# Patient Record
Sex: MALE | Race: WHITE | Employment: OTHER | URBAN - METROPOLITAN AREA
[De-identification: names, ages, dates, MRNs, and addresses within clinical notes are randomized per-mention and may not be internally consistent; named-entity substitution may affect disease eponyms.]

---

## 2018-01-01 ENCOUNTER — APPOINTMENT (OUTPATIENT)
Dept: CT IMAGING | Age: 83
DRG: 066 | End: 2018-01-01
Payer: MEDICARE

## 2018-01-01 ENCOUNTER — HOSPITAL ENCOUNTER (OUTPATIENT)
Age: 83
Discharge: HOME | End: 2018-07-13
Payer: MEDICARE

## 2018-01-01 ENCOUNTER — HOSPITAL ENCOUNTER (INPATIENT)
Age: 83
LOS: 1 days | Discharge: HOME | DRG: 066 | End: 2018-06-22
Attending: EMERGENCY MEDICINE | Admitting: INTERNAL MEDICINE
Payer: MEDICARE

## 2018-01-01 ENCOUNTER — TELEPHONE (OUTPATIENT)
Dept: CARDIOLOGY CLINIC | Age: 83
End: 2018-01-01

## 2018-01-01 ENCOUNTER — APPOINTMENT (OUTPATIENT)
Dept: MRI IMAGING | Age: 83
DRG: 066 | End: 2018-01-01
Payer: MEDICARE

## 2018-01-01 ENCOUNTER — TELEPHONE (OUTPATIENT)
Dept: SURGERY | Age: 83
End: 2018-01-01

## 2018-01-01 ENCOUNTER — HOSPITAL ENCOUNTER (OUTPATIENT)
Dept: CARDIOLOGY | Age: 83
Discharge: HOME | End: 2018-09-05
Payer: MEDICARE

## 2018-01-01 ENCOUNTER — CARE COORDINATION (OUTPATIENT)
Dept: CASE MANAGEMENT | Age: 83
End: 2018-01-01

## 2018-01-01 ENCOUNTER — APPOINTMENT (OUTPATIENT)
Dept: GENERAL RADIOLOGY | Age: 83
DRG: 066 | End: 2018-01-01
Payer: MEDICARE

## 2018-01-01 ENCOUNTER — OFFICE VISIT (OUTPATIENT)
Dept: FAMILY MEDICINE CLINIC | Age: 83
End: 2018-01-01
Payer: MEDICARE

## 2018-01-01 ENCOUNTER — APPOINTMENT (OUTPATIENT)
Dept: ULTRASOUND IMAGING | Age: 83
DRG: 066 | End: 2018-01-01
Payer: MEDICARE

## 2018-01-01 ENCOUNTER — OFFICE VISIT (OUTPATIENT)
Dept: CARDIOLOGY CLINIC | Age: 83
End: 2018-01-01
Payer: MEDICARE

## 2018-01-01 ENCOUNTER — NURSE ONLY (OUTPATIENT)
Dept: FAMILY MEDICINE CLINIC | Age: 83
End: 2018-01-01
Payer: MEDICARE

## 2018-01-01 VITALS
OXYGEN SATURATION: 98 % | HEIGHT: 71 IN | DIASTOLIC BLOOD PRESSURE: 64 MMHG | TEMPERATURE: 98.1 F | BODY MASS INDEX: 24.56 KG/M2 | HEART RATE: 70 BPM | RESPIRATION RATE: 18 BRPM | SYSTOLIC BLOOD PRESSURE: 130 MMHG | WEIGHT: 175.4 LBS

## 2018-01-01 VITALS
SYSTOLIC BLOOD PRESSURE: 90 MMHG | DIASTOLIC BLOOD PRESSURE: 60 MMHG | BODY MASS INDEX: 24.78 KG/M2 | WEIGHT: 177 LBS | HEART RATE: 58 BPM | TEMPERATURE: 98.2 F | RESPIRATION RATE: 18 BRPM | OXYGEN SATURATION: 96 % | HEIGHT: 71 IN

## 2018-01-01 VITALS
HEART RATE: 61 BPM | RESPIRATION RATE: 16 BRPM | DIASTOLIC BLOOD PRESSURE: 68 MMHG | BODY MASS INDEX: 24.11 KG/M2 | SYSTOLIC BLOOD PRESSURE: 130 MMHG | HEIGHT: 71 IN | WEIGHT: 172.2 LBS

## 2018-01-01 DIAGNOSIS — I25.10 CORONARY ARTERY DISEASE INVOLVING NATIVE HEART WITHOUT ANGINA PECTORIS, UNSPECIFIED VESSEL OR LESION TYPE: Chronic | ICD-10-CM

## 2018-01-01 DIAGNOSIS — E11.9 DIABETES MELLITUS WITHOUT COMPLICATION (HCC): Chronic | ICD-10-CM

## 2018-01-01 DIAGNOSIS — Z12.5 PROSTATE CANCER SCREENING: ICD-10-CM

## 2018-01-01 DIAGNOSIS — Z23 NEED FOR INFLUENZA VACCINATION: Primary | ICD-10-CM

## 2018-01-01 DIAGNOSIS — I71.40 ABDOMINAL AORTIC ANEURYSM (AAA) WITHOUT RUPTURE: Chronic | ICD-10-CM

## 2018-01-01 DIAGNOSIS — I63.9 ACUTE CEREBROVASCULAR ACCIDENT (CVA) (HCC): ICD-10-CM

## 2018-01-01 DIAGNOSIS — E78.5 HYPERLIPIDEMIA, UNSPECIFIED HYPERLIPIDEMIA TYPE: Chronic | ICD-10-CM

## 2018-01-01 DIAGNOSIS — Z23 NEED FOR PROPHYLACTIC VACCINATION AGAINST STREPTOCOCCUS PNEUMONIAE (PNEUMOCOCCUS): ICD-10-CM

## 2018-01-01 DIAGNOSIS — I71.40 ABDOMINAL AORTIC ANEURYSM (AAA) WITHOUT RUPTURE: ICD-10-CM

## 2018-01-01 DIAGNOSIS — I63.9 CEREBROVASCULAR ACCIDENT (CVA), UNSPECIFIED MECHANISM (HCC): ICD-10-CM

## 2018-01-01 DIAGNOSIS — I63.411 CEREBROVASCULAR ACCIDENT (CVA) DUE TO EMBOLISM OF RIGHT MIDDLE CEREBRAL ARTERY (HCC): Primary | ICD-10-CM

## 2018-01-01 DIAGNOSIS — G45.9 TRANSIENT CEREBRAL ISCHEMIA, UNSPECIFIED TYPE: Primary | ICD-10-CM

## 2018-01-01 DIAGNOSIS — I63.9 ACUTE CVA (CEREBROVASCULAR ACCIDENT) (HCC): ICD-10-CM

## 2018-01-01 DIAGNOSIS — I63.9 CEREBROVASCULAR ACCIDENT (CVA), UNSPECIFIED MECHANISM (HCC): Primary | ICD-10-CM

## 2018-01-01 DIAGNOSIS — I63.9 ACUTE CEREBROVASCULAR ACCIDENT (CVA) (HCC): Primary | ICD-10-CM

## 2018-01-01 DIAGNOSIS — I63.9 ACUTE CVA (CEREBROVASCULAR ACCIDENT) (HCC): Primary | ICD-10-CM

## 2018-01-01 DIAGNOSIS — I65.23 ASYMPTOMATIC BILATERAL CAROTID ARTERY STENOSIS: ICD-10-CM

## 2018-01-01 DIAGNOSIS — I10 ESSENTIAL HYPERTENSION, BENIGN: Primary | Chronic | ICD-10-CM

## 2018-01-01 LAB
ALBUMIN SERPL-MCNC: 3.7 G/DL (ref 3.5–5.2)
ALBUMIN SERPL-MCNC: 3.9 G/DL (ref 3.5–5.2)
ALP BLD-CCNC: 178 U/L (ref 40–129)
ALP BLD-CCNC: 180 U/L (ref 40–129)
ALT SERPL-CCNC: 18 U/L (ref 0–40)
ALT SERPL-CCNC: 22 U/L (ref 0–40)
ANION GAP SERPL CALCULATED.3IONS-SCNC: 11 MMOL/L (ref 7–16)
ANION GAP SERPL CALCULATED.3IONS-SCNC: 13 MMOL/L (ref 7–16)
ANION GAP SERPL CALCULATED.3IONS-SCNC: 14 MMOL/L (ref 7–16)
AST SERPL-CCNC: 16 U/L (ref 0–39)
AST SERPL-CCNC: 17 U/L (ref 0–39)
BACTERIA: ABNORMAL /HPF
BASOPHILIC STIPPLING: ABNORMAL
BASOPHILS ABSOLUTE: 0 E9/L (ref 0–0.2)
BASOPHILS ABSOLUTE: 0.07 E9/L (ref 0–0.2)
BASOPHILS RELATIVE PERCENT: 0.7 % (ref 0–2)
BASOPHILS RELATIVE PERCENT: 1.8 % (ref 0–2)
BILIRUB SERPL-MCNC: 0.4 MG/DL (ref 0–1.2)
BILIRUB SERPL-MCNC: 0.4 MG/DL (ref 0–1.2)
BILIRUBIN URINE: NEGATIVE
BLOOD, URINE: NEGATIVE
BUN BLDV-MCNC: 25 MG/DL (ref 8–23)
BUN BLDV-MCNC: 27 MG/DL (ref 8–23)
BUN BLDV-MCNC: 31 MG/DL (ref 8–23)
BURR CELLS: ABNORMAL
CALCIUM SERPL-MCNC: 8.3 MG/DL (ref 8.6–10.2)
CALCIUM SERPL-MCNC: 8.4 MG/DL (ref 8.6–10.2)
CALCIUM SERPL-MCNC: 8.6 MG/DL (ref 8.6–10.2)
CHLORIDE BLD-SCNC: 103 MMOL/L (ref 98–107)
CHLORIDE BLD-SCNC: 104 MMOL/L (ref 98–107)
CHLORIDE BLD-SCNC: 106 MMOL/L (ref 98–107)
CHOLESTEROL, TOTAL: 108 MG/DL (ref 0–199)
CLARITY: CLEAR
CO2: 23 MMOL/L (ref 22–29)
CO2: 25 MMOL/L (ref 22–29)
CO2: 25 MMOL/L (ref 22–29)
COLOR: YELLOW
CREAT SERPL-MCNC: 1.3 MG/DL (ref 0.7–1.2)
EKG ATRIAL RATE: 89 BPM
EKG P AXIS: 38 DEGREES
EKG P-R INTERVAL: 206 MS
EKG Q-T INTERVAL: 388 MS
EKG QRS DURATION: 128 MS
EKG QTC CALCULATION (BAZETT): 472 MS
EKG R AXIS: -64 DEGREES
EKG T AXIS: 41 DEGREES
EKG VENTRICULAR RATE: 89 BPM
EOSINOPHILS ABSOLUTE: 0.22 E9/L (ref 0.05–0.5)
EOSINOPHILS ABSOLUTE: 0.25 E9/L (ref 0.05–0.5)
EOSINOPHILS RELATIVE PERCENT: 5.2 % (ref 0–6)
EOSINOPHILS RELATIVE PERCENT: 6.1 % (ref 0–6)
GFR AFRICAN AMERICAN: >60
GFR NON-AFRICAN AMERICAN: 52 ML/MIN/1.73
GLUCOSE BLD-MCNC: 107 MG/DL (ref 74–109)
GLUCOSE BLD-MCNC: 172 MG/DL (ref 74–109)
GLUCOSE BLD-MCNC: 90 MG/DL (ref 74–109)
GLUCOSE URINE: NEGATIVE MG/DL
HBA1C MFR BLD: 5.5 % (ref 4–5.6)
HCT VFR BLD CALC: 34.2 % (ref 37–54)
HCT VFR BLD CALC: 34.6 % (ref 37–54)
HDLC SERPL-MCNC: 29 MG/DL
HEMOGLOBIN: 11.1 G/DL (ref 12.5–16.5)
HEMOGLOBIN: 11.3 G/DL (ref 12.5–16.5)
KETONES, URINE: NEGATIVE MG/DL
LDL CHOLESTEROL CALCULATED: 55 MG/DL (ref 0–99)
LEUKOCYTE ESTERASE, URINE: NEGATIVE
LV EF: 55 %
LVEF MODALITY: NORMAL
LYMPHOCYTES ABSOLUTE: 0.33 E9/L (ref 1.5–4)
LYMPHOCYTES ABSOLUTE: 0.46 E9/L (ref 1.5–4)
LYMPHOCYTES RELATIVE PERCENT: 11.3 % (ref 20–42)
LYMPHOCYTES RELATIVE PERCENT: 7.9 % (ref 20–42)
MCH RBC QN AUTO: 31.5 PG (ref 26–35)
MCH RBC QN AUTO: 31.9 PG (ref 26–35)
MCHC RBC AUTO-ENTMCNC: 32.5 % (ref 32–34.5)
MCHC RBC AUTO-ENTMCNC: 32.7 % (ref 32–34.5)
MCV RBC AUTO: 96.4 FL (ref 80–99.9)
MCV RBC AUTO: 98.3 FL (ref 80–99.9)
METER GLUCOSE: 84 MG/DL (ref 70–110)
MONOCYTES ABSOLUTE: 0.45 E9/L (ref 0.1–0.95)
MONOCYTES ABSOLUTE: 0.5 E9/L (ref 0.1–0.95)
MONOCYTES RELATIVE PERCENT: 10.5 % (ref 2–12)
MONOCYTES RELATIVE PERCENT: 12.2 % (ref 2–12)
NEUTROPHILS ABSOLUTE: 2.98 E9/L (ref 1.8–7.3)
NEUTROPHILS ABSOLUTE: 3.03 E9/L (ref 1.8–7.3)
NEUTROPHILS RELATIVE PERCENT: 71.3 % (ref 43–80)
NEUTROPHILS RELATIVE PERCENT: 73.7 % (ref 43–80)
NITRITE, URINE: NEGATIVE
NUCLEATED RED BLOOD CELLS: 0.9 /100 WBC
PDW BLD-RTO: 13.2 FL (ref 11.5–15)
PDW BLD-RTO: 13.2 FL (ref 11.5–15)
PH UA: 6 (ref 5–9)
PLATELET # BLD: 111 E9/L (ref 130–450)
PLATELET # BLD: 120 E9/L (ref 130–450)
PMV BLD AUTO: 10.8 FL (ref 7–12)
PMV BLD AUTO: 12.2 FL (ref 7–12)
POIKILOCYTES: ABNORMAL
POTASSIUM SERPL-SCNC: 4.4 MMOL/L (ref 3.5–5)
POTASSIUM SERPL-SCNC: 4.8 MMOL/L (ref 3.5–5)
POTASSIUM SERPL-SCNC: 5.9 MMOL/L (ref 3.5–5)
PROSTATE SPECIFIC ANTIGEN: 0.65 NG/ML (ref 0–4)
PROTEIN UA: 30 MG/DL
RBC # BLD: 3.48 E12/L (ref 3.8–5.8)
RBC # BLD: 3.59 E12/L (ref 3.8–5.8)
RBC # BLD: NORMAL 10*6/UL
RBC UA: ABNORMAL /HPF (ref 0–2)
SODIUM BLD-SCNC: 140 MMOL/L (ref 132–146)
SODIUM BLD-SCNC: 141 MMOL/L (ref 132–146)
SODIUM BLD-SCNC: 143 MMOL/L (ref 132–146)
SPECIFIC GRAVITY UA: 1.01 (ref 1–1.03)
TOTAL PROTEIN: 6.6 G/DL (ref 6.4–8.3)
TOTAL PROTEIN: 6.7 G/DL (ref 6.4–8.3)
TRIGL SERPL-MCNC: 119 MG/DL (ref 0–149)
TROPONIN: <0.01 NG/ML (ref 0–0.03)
TSH SERPL DL<=0.05 MIU/L-ACNC: 1.94 UIU/ML (ref 0.27–4.2)
UROBILINOGEN, URINE: 0.2 E.U./DL
VLDLC SERPL CALC-MCNC: 24 MG/DL
WBC # BLD: 4.1 E9/L (ref 4.5–11.5)
WBC # BLD: 4.2 E9/L (ref 4.5–11.5)
WBC UA: ABNORMAL /HPF (ref 0–5)

## 2018-01-01 PROCEDURE — 70498 CT ANGIOGRAPHY NECK: CPT

## 2018-01-01 PROCEDURE — 70551 MRI BRAIN STEM W/O DYE: CPT

## 2018-01-01 PROCEDURE — 93000 ELECTROCARDIOGRAM COMPLETE: CPT | Performed by: INTERNAL MEDICINE

## 2018-01-01 PROCEDURE — 6370000000 HC RX 637 (ALT 250 FOR IP): Performed by: INTERNAL MEDICINE

## 2018-01-01 PROCEDURE — G0009 ADMIN PNEUMOCOCCAL VACCINE: HCPCS | Performed by: FAMILY MEDICINE

## 2018-01-01 PROCEDURE — 2580000003 HC RX 258: Performed by: INTERNAL MEDICINE

## 2018-01-01 PROCEDURE — 84484 ASSAY OF TROPONIN QUANT: CPT

## 2018-01-01 PROCEDURE — 80061 LIPID PANEL: CPT

## 2018-01-01 PROCEDURE — 4040F PNEUMOC VAC/ADMIN/RCVD: CPT | Performed by: INTERNAL MEDICINE

## 2018-01-01 PROCEDURE — G0378 HOSPITAL OBSERVATION PER HR: HCPCS

## 2018-01-01 PROCEDURE — 99204 OFFICE O/P NEW MOD 45 MIN: CPT | Performed by: INTERNAL MEDICINE

## 2018-01-01 PROCEDURE — 70496 CT ANGIOGRAPHY HEAD: CPT

## 2018-01-01 PROCEDURE — 6360000004 HC RX CONTRAST MEDICATION: Performed by: RADIOLOGY

## 2018-01-01 PROCEDURE — 99221 1ST HOSP IP/OBS SF/LOW 40: CPT | Performed by: PSYCHIATRY & NEUROLOGY

## 2018-01-01 PROCEDURE — 82962 GLUCOSE BLOOD TEST: CPT

## 2018-01-01 PROCEDURE — G8598 ASA/ANTIPLAT THER USED: HCPCS | Performed by: INTERNAL MEDICINE

## 2018-01-01 PROCEDURE — 3288F FALL RISK ASSESSMENT DOCD: CPT | Performed by: FAMILY MEDICINE

## 2018-01-01 PROCEDURE — G0008 ADMIN INFLUENZA VIRUS VAC: HCPCS | Performed by: FAMILY MEDICINE

## 2018-01-01 PROCEDURE — 85025 COMPLETE CBC W/AUTO DIFF WBC: CPT

## 2018-01-01 PROCEDURE — 93005 ELECTROCARDIOGRAM TRACING: CPT

## 2018-01-01 PROCEDURE — 83036 HEMOGLOBIN GLYCOSYLATED A1C: CPT

## 2018-01-01 PROCEDURE — 90732 PPSV23 VACC 2 YRS+ SUBQ/IM: CPT | Performed by: FAMILY MEDICINE

## 2018-01-01 PROCEDURE — 1101F PT FALLS ASSESS-DOCD LE1/YR: CPT | Performed by: INTERNAL MEDICINE

## 2018-01-01 PROCEDURE — G8420 CALC BMI NORM PARAMETERS: HCPCS | Performed by: INTERNAL MEDICINE

## 2018-01-01 PROCEDURE — 81001 URINALYSIS AUTO W/SCOPE: CPT

## 2018-01-01 PROCEDURE — 36415 COLL VENOUS BLD VENIPUNCTURE: CPT

## 2018-01-01 PROCEDURE — 1036F TOBACCO NON-USER: CPT | Performed by: INTERNAL MEDICINE

## 2018-01-01 PROCEDURE — 99214 OFFICE O/P EST MOD 30 MIN: CPT | Performed by: FAMILY MEDICINE

## 2018-01-01 PROCEDURE — 2060000000 HC ICU INTERMEDIATE R&B

## 2018-01-01 PROCEDURE — 80053 COMPREHEN METABOLIC PANEL: CPT

## 2018-01-01 PROCEDURE — 93880 EXTRACRANIAL BILAT STUDY: CPT

## 2018-01-01 PROCEDURE — 90662 IIV NO PRSV INCREASED AG IM: CPT | Performed by: FAMILY MEDICINE

## 2018-01-01 PROCEDURE — 1111F DSCHRG MED/CURRENT MED MERGE: CPT | Performed by: FAMILY MEDICINE

## 2018-01-01 PROCEDURE — 70450 CT HEAD/BRAIN W/O DYE: CPT

## 2018-01-01 PROCEDURE — 80048 BASIC METABOLIC PNL TOTAL CA: CPT

## 2018-01-01 PROCEDURE — 71045 X-RAY EXAM CHEST 1 VIEW: CPT

## 2018-01-01 PROCEDURE — G8427 DOCREV CUR MEDS BY ELIG CLIN: HCPCS | Performed by: INTERNAL MEDICINE

## 2018-01-01 PROCEDURE — 99232 SBSQ HOSP IP/OBS MODERATE 35: CPT | Performed by: NURSE PRACTITIONER

## 2018-01-01 PROCEDURE — 99285 EMERGENCY DEPT VISIT HI MDM: CPT

## 2018-01-01 PROCEDURE — 84443 ASSAY THYROID STIM HORMONE: CPT

## 2018-01-01 PROCEDURE — G8510 SCR DEP NEG, NO PLAN REQD: HCPCS | Performed by: FAMILY MEDICINE

## 2018-01-01 PROCEDURE — 93306 TTE W/DOPPLER COMPLETE: CPT | Performed by: PSYCHIATRY & NEUROLOGY

## 2018-01-01 PROCEDURE — G0103 PSA SCREENING: HCPCS

## 2018-01-01 PROCEDURE — 1123F ACP DISCUSS/DSCN MKR DOCD: CPT | Performed by: INTERNAL MEDICINE

## 2018-01-01 PROCEDURE — APPNB60 APP NON BILLABLE TIME 46-60 MINS: Performed by: NURSE PRACTITIONER

## 2018-01-01 RX ORDER — ATORVASTATIN CALCIUM 80 MG/1
80 TABLET, FILM COATED ORAL DAILY
Qty: 90 TABLET | Refills: 3 | Status: SHIPPED | OUTPATIENT
Start: 2018-01-01

## 2018-01-01 RX ORDER — FLUTICASONE PROPIONATE 50 MCG
1 SPRAY, SUSPENSION (ML) NASAL DAILY
Status: DISCONTINUED | OUTPATIENT
Start: 2018-01-01 | End: 2018-01-01 | Stop reason: HOSPADM

## 2018-01-01 RX ORDER — SODIUM CHLORIDE 9 MG/ML
INJECTION, SOLUTION INTRAVENOUS CONTINUOUS
Status: DISCONTINUED | OUTPATIENT
Start: 2018-01-01 | End: 2018-01-01

## 2018-01-01 RX ORDER — DOCUSATE SODIUM 100 MG/1
200 CAPSULE, LIQUID FILLED ORAL NIGHTLY
COMMUNITY

## 2018-01-01 RX ORDER — ONDANSETRON 2 MG/ML
4 INJECTION INTRAMUSCULAR; INTRAVENOUS EVERY 6 HOURS PRN
Status: DISCONTINUED | OUTPATIENT
Start: 2018-01-01 | End: 2018-01-01 | Stop reason: HOSPADM

## 2018-01-01 RX ORDER — ATORVASTATIN CALCIUM 80 MG/1
80 TABLET, FILM COATED ORAL DAILY
Qty: 90 TABLET | Refills: 1 | Status: SHIPPED | OUTPATIENT
Start: 2018-01-01 | End: 2018-01-01 | Stop reason: SDUPTHER

## 2018-01-01 RX ORDER — SODIUM CHLORIDE 0.9 % (FLUSH) 0.9 %
10 SYRINGE (ML) INJECTION PRN
Status: DISCONTINUED | OUTPATIENT
Start: 2018-01-01 | End: 2018-01-01 | Stop reason: HOSPADM

## 2018-01-01 RX ORDER — BRIMONIDINE TARTRATE, TIMOLOL MALEATE 2; 5 MG/ML; MG/ML
1 SOLUTION/ DROPS OPHTHALMIC EVERY 12 HOURS
Status: DISCONTINUED | OUTPATIENT
Start: 2018-01-01 | End: 2018-01-01 | Stop reason: SDUPTHER

## 2018-01-01 RX ORDER — CLOPIDOGREL BISULFATE 75 MG/1
75 TABLET ORAL DAILY
Status: DISCONTINUED | OUTPATIENT
Start: 2018-01-01 | End: 2018-01-01 | Stop reason: HOSPADM

## 2018-01-01 RX ORDER — RAMIPRIL 2.5 MG/1
2.5 CAPSULE ORAL DAILY
Status: DISCONTINUED | OUTPATIENT
Start: 2018-01-01 | End: 2018-01-01 | Stop reason: HOSPADM

## 2018-01-01 RX ORDER — ATORVASTATIN CALCIUM 40 MG/1
40 TABLET, FILM COATED ORAL DAILY
Status: DISCONTINUED | OUTPATIENT
Start: 2018-01-01 | End: 2018-01-01

## 2018-01-01 RX ORDER — ACETAMINOPHEN 325 MG/1
650 TABLET ORAL EVERY 4 HOURS PRN
Status: DISCONTINUED | OUTPATIENT
Start: 2018-01-01 | End: 2018-01-01 | Stop reason: HOSPADM

## 2018-01-01 RX ORDER — RAMIPRIL 2.5 MG/1
2.5 CAPSULE ORAL DAILY
Qty: 30 CAPSULE | Refills: 11 | Status: ON HOLD | OUTPATIENT
Start: 2018-01-01 | End: 2019-01-01 | Stop reason: HOSPADM

## 2018-01-01 RX ORDER — SODIUM CHLORIDE 0.9 % (FLUSH) 0.9 %
10 SYRINGE (ML) INJECTION EVERY 12 HOURS SCHEDULED
Status: DISCONTINUED | OUTPATIENT
Start: 2018-01-01 | End: 2018-01-01 | Stop reason: SDUPTHER

## 2018-01-01 RX ORDER — FINASTERIDE 5 MG/1
5 TABLET, FILM COATED ORAL DAILY
Status: DISCONTINUED | OUTPATIENT
Start: 2018-01-01 | End: 2018-01-01 | Stop reason: HOSPADM

## 2018-01-01 RX ORDER — SODIUM CHLORIDE 0.9 % (FLUSH) 0.9 %
10 SYRINGE (ML) INJECTION EVERY 12 HOURS SCHEDULED
Status: DISCONTINUED | OUTPATIENT
Start: 2018-01-01 | End: 2018-01-01 | Stop reason: HOSPADM

## 2018-01-01 RX ORDER — CLOPIDOGREL BISULFATE 75 MG/1
75 TABLET ORAL DAILY
Qty: 90 TABLET | Refills: 3 | Status: SHIPPED | OUTPATIENT
Start: 2018-01-01 | End: 2019-01-01

## 2018-01-01 RX ORDER — BRIMONIDINE TARTRATE 2 MG/ML
1 SOLUTION/ DROPS OPHTHALMIC 2 TIMES DAILY
Status: DISCONTINUED | OUTPATIENT
Start: 2018-01-01 | End: 2018-01-01 | Stop reason: HOSPADM

## 2018-01-01 RX ORDER — AMOXICILLIN 500 MG
1200 CAPSULE ORAL DAILY
Status: ON HOLD | COMMUNITY
End: 2019-01-01 | Stop reason: HOSPADM

## 2018-01-01 RX ORDER — SODIUM CHLORIDE 0.9 % (FLUSH) 0.9 %
10 SYRINGE (ML) INJECTION ONCE
Status: DISCONTINUED | OUTPATIENT
Start: 2018-01-01 | End: 2018-01-01 | Stop reason: HOSPADM

## 2018-01-01 RX ORDER — DORZOLAMIDE HCL 20 MG/ML
1 SOLUTION/ DROPS OPHTHALMIC 3 TIMES DAILY
Status: DISCONTINUED | OUTPATIENT
Start: 2018-01-01 | End: 2018-01-01 | Stop reason: HOSPADM

## 2018-01-01 RX ORDER — SODIUM CHLORIDE 0.9 % (FLUSH) 0.9 %
10 SYRINGE (ML) INJECTION PRN
Status: DISCONTINUED | OUTPATIENT
Start: 2018-01-01 | End: 2018-01-01 | Stop reason: SDUPTHER

## 2018-01-01 RX ORDER — TIMOLOL MALEATE 5 MG/ML
1 SOLUTION/ DROPS OPHTHALMIC 2 TIMES DAILY
Status: DISCONTINUED | OUTPATIENT
Start: 2018-01-01 | End: 2018-01-01 | Stop reason: HOSPADM

## 2018-01-01 RX ORDER — ASPIRIN 81 MG/1
81 TABLET ORAL DAILY
Status: DISCONTINUED | OUTPATIENT
Start: 2018-01-01 | End: 2018-01-01 | Stop reason: HOSPADM

## 2018-01-01 RX ORDER — METOPROLOL TARTRATE 50 MG/1
50 TABLET, FILM COATED ORAL 2 TIMES DAILY
Status: DISCONTINUED | OUTPATIENT
Start: 2018-01-01 | End: 2018-01-01 | Stop reason: HOSPADM

## 2018-01-01 RX ORDER — ATORVASTATIN CALCIUM 40 MG/1
80 TABLET, FILM COATED ORAL DAILY
Status: DISCONTINUED | OUTPATIENT
Start: 2018-01-01 | End: 2018-01-01 | Stop reason: HOSPADM

## 2018-01-01 RX ORDER — LATANOPROST 50 UG/ML
1 SOLUTION/ DROPS OPHTHALMIC NIGHTLY
Status: DISCONTINUED | OUTPATIENT
Start: 2018-01-01 | End: 2018-01-01 | Stop reason: HOSPADM

## 2018-01-01 RX ORDER — TAMSULOSIN HYDROCHLORIDE 0.4 MG/1
0.4 CAPSULE ORAL DAILY
Status: DISCONTINUED | OUTPATIENT
Start: 2018-01-01 | End: 2018-01-01 | Stop reason: HOSPADM

## 2018-01-01 RX ADMIN — METOPROLOL TARTRATE 50 MG: 50 TABLET ORAL at 08:31

## 2018-01-01 RX ADMIN — METOPROLOL TARTRATE 50 MG: 50 TABLET ORAL at 20:21

## 2018-01-01 RX ADMIN — METFORMIN HYDROCHLORIDE 500 MG: 500 TABLET ORAL at 17:00

## 2018-01-01 RX ADMIN — SODIUM CHLORIDE 75 ML/HR: 9 INJECTION, SOLUTION INTRAVENOUS at 00:53

## 2018-01-01 RX ADMIN — METOPROLOL TARTRATE 50 MG: 50 TABLET ORAL at 10:25

## 2018-01-01 RX ADMIN — BRIMONIDINE TARTRATE 1 DROP: 2 SOLUTION OPHTHALMIC at 10:29

## 2018-01-01 RX ADMIN — DORZOLAMIDE HYDROCHLORIDE 1 DROP: 20 SOLUTION/ DROPS OPHTHALMIC at 08:31

## 2018-01-01 RX ADMIN — LATANOPROST 1 DROP: 50 SOLUTION OPHTHALMIC at 00:50

## 2018-01-01 RX ADMIN — RAMIPRIL 2.5 MG: 2.5 CAPSULE ORAL at 10:26

## 2018-01-01 RX ADMIN — ATORVASTATIN CALCIUM 40 MG: 40 TABLET, FILM COATED ORAL at 10:25

## 2018-01-01 RX ADMIN — TIMOLOL MALEATE 1 DROP: 5 SOLUTION OPHTHALMIC at 08:31

## 2018-01-01 RX ADMIN — METFORMIN HYDROCHLORIDE 500 MG: 500 TABLET ORAL at 08:30

## 2018-01-01 RX ADMIN — FINASTERIDE 5 MG: 5 TABLET, FILM COATED ORAL at 10:25

## 2018-01-01 RX ADMIN — BRIMONIDINE TARTRATE 1 DROP: 2 SOLUTION OPHTHALMIC at 08:31

## 2018-01-01 RX ADMIN — LATANOPROST 1 DROP: 50 SOLUTION OPHTHALMIC at 20:23

## 2018-01-01 RX ADMIN — TIMOLOL MALEATE 1 DROP: 5 SOLUTION OPHTHALMIC at 20:25

## 2018-01-01 RX ADMIN — ATORVASTATIN CALCIUM 40 MG: 40 TABLET, FILM COATED ORAL at 08:30

## 2018-01-01 RX ADMIN — SODIUM CHLORIDE: 9 INJECTION, SOLUTION INTRAVENOUS at 03:06

## 2018-01-01 RX ADMIN — Medication 10 ML: at 12:07

## 2018-01-01 RX ADMIN — RAMIPRIL 2.5 MG: 2.5 CAPSULE ORAL at 13:38

## 2018-01-01 RX ADMIN — IOPAMIDOL 60 ML: 755 INJECTION, SOLUTION INTRAVENOUS at 12:15

## 2018-01-01 RX ADMIN — TAMSULOSIN HYDROCHLORIDE 0.4 MG: 0.4 CAPSULE ORAL at 10:26

## 2018-01-01 RX ADMIN — BRIMONIDINE TARTRATE 1 DROP: 2 SOLUTION OPHTHALMIC at 20:25

## 2018-01-01 RX ADMIN — TIMOLOL MALEATE 1 DROP: 5 SOLUTION OPHTHALMIC at 10:32

## 2018-01-01 RX ADMIN — FINASTERIDE 5 MG: 5 TABLET, FILM COATED ORAL at 08:31

## 2018-01-01 RX ADMIN — Medication 10 ML: at 00:56

## 2018-01-01 RX ADMIN — TAMSULOSIN HYDROCHLORIDE 0.4 MG: 0.4 CAPSULE ORAL at 08:31

## 2018-01-01 RX ADMIN — ASPIRIN 81 MG: 81 TABLET, COATED ORAL at 08:30

## 2018-01-01 RX ADMIN — DORZOLAMIDE HYDROCHLORIDE 1 DROP: 20 SOLUTION/ DROPS OPHTHALMIC at 00:50

## 2018-01-01 RX ADMIN — Medication 10 ML: at 12:05

## 2018-01-01 RX ADMIN — DORZOLAMIDE HYDROCHLORIDE 1 DROP: 20 SOLUTION/ DROPS OPHTHALMIC at 20:23

## 2018-01-01 RX ADMIN — CLOPIDOGREL 75 MG: 75 TABLET, FILM COATED ORAL at 10:25

## 2018-01-01 RX ADMIN — ASPIRIN 81 MG: 81 TABLET, COATED ORAL at 10:25

## 2018-01-01 RX ADMIN — METFORMIN HYDROCHLORIDE 500 MG: 500 TABLET ORAL at 10:25

## 2018-01-01 RX ADMIN — CLOPIDOGREL 75 MG: 75 TABLET, FILM COATED ORAL at 08:30

## 2018-01-01 RX ADMIN — DORZOLAMIDE HYDROCHLORIDE 1 DROP: 20 SOLUTION/ DROPS OPHTHALMIC at 10:33

## 2018-01-01 ASSESSMENT — ENCOUNTER SYMPTOMS
ABDOMINAL PAIN: 0
WHEEZING: 0
COLOR CHANGE: 0
COUGH: 0
NAUSEA: 0
BACK PAIN: 0
DIARRHEA: 0
SHORTNESS OF BREATH: 0
VOMITING: 0

## 2018-01-01 ASSESSMENT — PAIN SCALES - GENERAL
PAINLEVEL_OUTOF10: 0

## 2018-01-01 ASSESSMENT — PATIENT HEALTH QUESTIONNAIRE - PHQ9
SUM OF ALL RESPONSES TO PHQ QUESTIONS 1-9: 0
2. FEELING DOWN, DEPRESSED OR HOPELESS: 0
1. LITTLE INTEREST OR PLEASURE IN DOING THINGS: 0
SUM OF ALL RESPONSES TO PHQ9 QUESTIONS 1 & 2: 0

## 2018-06-20 PROBLEM — I25.10 CAD (CORONARY ARTERY DISEASE): Chronic | Status: ACTIVE | Noted: 2018-01-01

## 2018-06-20 PROBLEM — G45.9 TIA (TRANSIENT ISCHEMIC ATTACK): Status: ACTIVE | Noted: 2018-01-01

## 2018-06-21 PROBLEM — I65.22 CAROTID STENOSIS, LEFT: Status: ACTIVE | Noted: 2018-01-01

## 2018-06-22 PROBLEM — I65.23 ASYMPTOMATIC BILATERAL CAROTID ARTERY STENOSIS: Status: ACTIVE | Noted: 2018-01-01

## 2018-06-22 PROBLEM — I63.9 ACUTE CVA (CEREBROVASCULAR ACCIDENT) (HCC): Status: ACTIVE | Noted: 2018-01-01

## 2018-06-22 PROBLEM — I63.9 ACUTE CEREBROVASCULAR ACCIDENT (CVA) (HCC): Status: ACTIVE | Noted: 2018-01-01

## 2018-07-11 NOTE — PATIENT INSTRUCTIONS
Patient Education          pneumococcal polysaccharides vaccine (PPSV), 23-valent  Pronunciation:  SP MCNEAL al TARAH ee NATE swan 23-BRANDIE culver  Brand:  Pneumovax 23  What is the most important information I should know about this vaccine? PPSV should be given at least 2 weeks before the start of any treatment that can weaken your immune system. PPSV is also given at least 2 weeks before you undergo a splenectomy (surgical removal of the spleen). The timing of this vaccination is very important for it to be effective. Follow your doctor's instructions. You can still receive a vaccine if you have a cold or fever. In the case of a more severe illness with a fever or any type of infection, wait until you get better before receiving this vaccine. You should not receive a booster vaccine if you had a life-threatening allergic reaction after the first shot. Keep track of any and all side effects you have after receiving this vaccine. If you ever need to receive a booster dose, you will need to tell your doctor if the previous shot caused any side effects. Becoming infected with pneumococcal disease (such as pneumonia or meningitis) is much more dangerous to your health than receiving this vaccine. However, like any medicine, this vaccine can cause side effects but the risk of serious side effects is extremely low. What is pneumococcal polysaccharides vaccine (PPSV)? Pneumococcal disease is a serious infection caused by a bacteria. Pneumococcal bacteria can infect the sinuses and inner ear. It can also infect the lungs, blood, and brain and these conditions can be fatal.  Pneumococcal polysaccharides vaccine (PPSV) is used to prevent infection caused by pneumococcal bacteria. PPSV contains 23 of the most common types of pneumococcal bacteria. PPSV works by exposing you to a small dose of the bacteria or a protein from the bacteria, which causes your body to develop immunity to the disease.  PPSV will not treat weakness;  · numbness or tingly feeling in your feet and spreading upward, severe lower back pain;  · changes in behavior, problems with vision, speech, swallowing, or bladder and bowel functions; or  · slow heart rate, trouble breathing, feeling like you might pass out. Less serious side effects are more likely to occur, such as:  · low fever (102 degrees or less), chills, tired feeling;  · swelling, pain, tenderness, or redness anywhere on your body;  · headache, nausea, vomiting;  · joint or muscle pain;  · swelling or stiffness in the arm or leg the vaccine was injected into;  · mild skin rash; or  · mild soreness, warmth, redness, swelling, or a hard lump where the shot was given. This is not a complete list of side effects and others may occur. Call your doctor for medical advice about side effects. You may report vaccine side effects to the [address] [place] [address] and Human Services at [phone]. What other drugs will affect pneumococcal polysaccharides vaccine (PPSV)? Before receiving this vaccine, tell the doctor about all other vaccines you have recently received. Also tell the doctor if you have recently received drugs or treatments that can weaken the immune system, including:  · an oral, nasal, inhaled, or injectable steroid medicine;  · medications to treat psoriasis, rheumatoid arthritis, or other autoimmune disorders, such as azathioprine (Imuran), etanercept (Enbrel), leflunomide ([address]), and others; or  · medicines to treat or prevent organ transplant rejection, such as basiliximab (Simulect), cyclosporine (Sandimmune, Neoral, Gengraf), muromonab-CD3 (Orthoclone), mycophenolate mofetil (CellCept), sirolimus (Rapamune), or tacrolimus (Prograf). If you are using any of these medications, you may not be able to receive the vaccine, or may need to wait until the other treatments are finished. This list is not complete and other drugs may interact with PPSV.  Tell your doctor about all or adverse effects. If you have questions about the drugs you are taking, check with your doctor, nurse or pharmacist.  Copyright 9247-5925 [address]. Version: 5.02. Revision date: 10/17/2012. Care instructions adapted under license by [facility] ([facility]). If you have questions about a medical condition or this instruction, always ask your healthcare professional. [place] [address] any warranty or liability for your use of this information.

## 2018-07-11 NOTE — PROGRESS NOTES
Post-Discharge Transitional Care Management Services      Macye Crespo   YOB: 1932    Date of Office Visit:  7/11/2018  Date of Hospital Admission: 6/20/18  Date of Hospital Discharge: 6/22/18  [facility] Risk Score [risk of hospital readmission >=10  medium risk (chance of readmission ~ 12%) >14  high risk (chance of readmission ~18%)]:Readmission Risk Score: 16    Care management risk score Rising risk (score 2-5) and Complex Care (Scores >=6): 2       Patient Active Problem List   Diagnosis    [facility]    Hyperlipidemia    Essential hypertension, benign    Abdominal aortic aneurysm (AAA) without rupture ([facility]    CAD (coronary artery disease)    Asymptomatic bilateral carotid artery stenosis    Acute CVA (cerebrovascular accident) ([facility]    Acute cerebrovascular accident (CVA) ([facility]       No Known Allergies    Medications listed as ordered at the time of discharge from hospital   [facility], [address] Medication Instructions KATE:    Printed on:07/11/18 1013   Medication Information                      aspirin EC 81 MG EC tablet  Take 162 mg by mouth daily              atorvastatin (LIPITOR) 80 MG tablet  Take 1 tablet by mouth daily             bimatoprost (LUMIGAN) 0.01 % SOLN ophthalmic drops  Place 1 drop into both eyes nightly. brimonidine-timolol (COMBIGAN) 0.2-0.5 % ophthalmic solution  Place 1 drop into both eyes every 12 hours. brinzolamide (AZOPT) 1 % ophthalmic suspension  1 drop 3 times daily. clopidogrel (PLAVIX) 75 MG tablet  Take 75 mg by mouth daily. docusate sodium (COLACE) 100 MG capsule  Take 200 mg by mouth nightly             fexofenadine (ALLEGRA) 180 MG tablet  Take 180 mg by mouth daily.              finasteride (PROSCAR) 5 MG tablet  TK 1 T PO D             metFORMIN (GLUCOPHAGE) 500 MG tablet  Take 1 tablet by mouth 2 times daily (with meals)             metoprolol tartrate Medications patient taking as of now reconciled against medications ordered at time of hospital discharge: Yes    Vitals:    07/11/18 0950   BP: 90/60   Pulse: 58   Resp: 18   Temp: 98.2 °F (36.8 °C)   SpO2: 96%   Weight: 177 lb (80.3 kg)   Height: 5' 11\" (1.803 m)     Body mass index is 24.69 kg/m². Wt Readings from Last 3 Encounters:   07/11/18 177 lb (80.3 kg)   06/21/18 175 lb 6.4 oz (79.6 kg)   11/23/16 187 lb (84.8 kg)     BP Readings from Last 3 Encounters:   07/11/18 90/60   06/22/18 130/64   11/23/16 128/70        Inpatient course: Discharge summary reviewed- see chart.     Chief Complaint   Patient presents with   Chermiriam Profit Cerebrovascular Accident     6/22 follow up hospital, thinks it may have effected his eyesight more, eyesight is worse     History of Present illness - Follow up of Hospital diagnosis(es): cva      Non face to face  following discharge, date last encounter closed (first attempt may have been earlier): 6/26/2018  9:29 AM    Call initiated 2 business days of discharge: Yes     Interval history/Current status: stable    Physical Exam:  General Appearance: alert and oriented to person, place and time, well developed and well- nourished, in no acute distress  Skin: warm and dry, no rash or erythema  Head: normocephalic and atraumatic  Eyes: pupils equal, round, and reactive to light, extraocular eye movements intact, conjunctivae normal  ENT: tympanic membrane, external ear and ear canal normal bilaterally, nose without deformity, nasal mucosa and turbinates normal without polyps  Neck: supple and non-tender without mass, no thyromegaly or thyroid nodules, no cervical lymphadenopathy  Pulmonary/Chest: clear to auscultation bilaterally- no wheezes, rales or rhonchi, normal air movement, no respiratory distress  Cardiovascular: normal rate, regular rhythm, normal S1 and S2, no murmurs, rubs, clicks, or gallops, distal pulses intact, no carotid bruits  Abdomen: soft, non-tender, non-distended, normal bowel sounds, no masses or organomegaly  Extremities: no cyanosis, clubbing or edema  Musculoskeletal: normal range of motion, no joint swelling, deformity or tenderness  Neurologic: reflexes normal and symmetric, no cranial nerve deficit, gait, coordination and speech normal        Assessment/Plan:  Phil Saunders was seen today for cerebrovascular accident. Diagnoses and all orders for this visit:    Acute cerebrovascular accident (CVA) ([facility]  -     CBC Auto Differential; Future  -     Comprehensive Metabolic Panel; Future  -     Lipid Panel; Future  -     TSH without Reflex; Future  -     Hemoglobin A1C; Future  -     PSA SCREENING; Future    Diabetes mellitus without complication (HCC)  -     CBC Auto Differential; Future  -     Comprehensive Metabolic Panel; Future  -     Lipid Panel; Future  -     TSH without Reflex; Future  -     Hemoglobin A1C; Future    Coronary artery disease involving native heart without angina pectoris, unspecified vessel or lesion type  -     [facility]- Emelina Fox DO    Prostate cancer screening  -     PSA SCREENING;  Future    Hyperlipidemia, unspecified hyperlipidemia type    Need for prophylactic vaccination against Streptococcus pneumoniae (pneumococcus)    Other orders  -     Pneumococcal polysaccharide vaccine 23-valent greater than or equal to 3yo subcutaneous/IM          Medical Decision Making: moderate complexity

## 2019-01-01 ENCOUNTER — HOSPITAL ENCOUNTER (INPATIENT)
Age: 84
LOS: 3 days | Discharge: NURSING, REHAB OR LONG-TERM CARE | DRG: 064 | End: 2019-04-26
Attending: INTERNAL MEDICINE | Admitting: INTERNAL MEDICINE
Payer: MEDICARE

## 2019-01-01 ENCOUNTER — OFFICE VISIT (OUTPATIENT)
Dept: VASCULAR SURGERY | Age: 84
End: 2019-01-01
Payer: MEDICARE

## 2019-01-01 ENCOUNTER — OFFICE VISIT (OUTPATIENT)
Dept: FAMILY MEDICINE CLINIC | Age: 84
End: 2019-01-01
Payer: MEDICARE

## 2019-01-01 ENCOUNTER — APPOINTMENT (OUTPATIENT)
Dept: GENERAL RADIOLOGY | Age: 84
DRG: 177 | End: 2019-01-01
Payer: MEDICARE

## 2019-01-01 ENCOUNTER — TELEPHONE (OUTPATIENT)
Dept: FAMILY MEDICINE CLINIC | Age: 84
End: 2019-01-01

## 2019-01-01 ENCOUNTER — APPOINTMENT (OUTPATIENT)
Dept: GENERAL RADIOLOGY | Age: 84
DRG: 871 | End: 2019-01-01
Payer: MEDICARE

## 2019-01-01 ENCOUNTER — HOSPITAL ENCOUNTER (OUTPATIENT)
Dept: GENERAL RADIOLOGY | Age: 84
Discharge: HOME | End: 2019-03-27
Payer: MEDICARE

## 2019-01-01 ENCOUNTER — TELEPHONE (OUTPATIENT)
Dept: VASCULAR SURGERY | Age: 84
End: 2019-01-01

## 2019-01-01 ENCOUNTER — HOSPITAL ENCOUNTER (OUTPATIENT)
Age: 84
Discharge: HOME | End: 2019-04-23
Payer: MEDICARE

## 2019-01-01 ENCOUNTER — APPOINTMENT (OUTPATIENT)
Dept: MRI IMAGING | Age: 84
DRG: 177 | End: 2019-01-01
Payer: MEDICARE

## 2019-01-01 ENCOUNTER — HOSPITAL ENCOUNTER (INPATIENT)
Age: 84
LOS: 3 days | DRG: 871 | End: 2019-05-03
Attending: EMERGENCY MEDICINE | Admitting: INTERNAL MEDICINE
Payer: MEDICARE

## 2019-01-01 ENCOUNTER — APPOINTMENT (OUTPATIENT)
Dept: CT IMAGING | Age: 84
DRG: 177 | End: 2019-01-01
Payer: MEDICARE

## 2019-01-01 ENCOUNTER — HOSPITAL ENCOUNTER (OUTPATIENT)
Dept: CT IMAGING | Age: 84
Discharge: HOME | End: 2019-03-08
Payer: MEDICARE

## 2019-01-01 ENCOUNTER — TELEPHONE (OUTPATIENT)
Dept: OTHER | Facility: CLINIC | Age: 84
End: 2019-01-01

## 2019-01-01 ENCOUNTER — HOSPITAL ENCOUNTER (OUTPATIENT)
Age: 84
Discharge: HOME | End: 2019-04-19

## 2019-01-01 ENCOUNTER — APPOINTMENT (OUTPATIENT)
Dept: CT IMAGING | Age: 84
DRG: 871 | End: 2019-01-01
Payer: MEDICARE

## 2019-01-01 ENCOUNTER — HOSPITAL ENCOUNTER (OUTPATIENT)
Dept: CARDIOLOGY | Age: 84
Discharge: HOME | End: 2019-01-07
Payer: MEDICARE

## 2019-01-01 ENCOUNTER — ANESTHESIA (OUTPATIENT)
Dept: ENDOSCOPY | Age: 84
End: 2019-01-01

## 2019-01-01 ENCOUNTER — HOSPITAL ENCOUNTER (OUTPATIENT)
Age: 84
Discharge: HOME | End: 2019-04-03
Payer: MEDICARE

## 2019-01-01 ENCOUNTER — APPOINTMENT (OUTPATIENT)
Dept: ULTRASOUND IMAGING | Age: 84
DRG: 064 | End: 2019-01-01
Attending: INTERNAL MEDICINE
Payer: MEDICARE

## 2019-01-01 ENCOUNTER — ANESTHESIA EVENT (OUTPATIENT)
Dept: ENDOSCOPY | Age: 84
End: 2019-01-01

## 2019-01-01 ENCOUNTER — APPOINTMENT (OUTPATIENT)
Dept: GENERAL RADIOLOGY | Age: 84
DRG: 064 | End: 2019-01-01
Attending: INTERNAL MEDICINE
Payer: MEDICARE

## 2019-01-01 ENCOUNTER — ANESTHESIA (OUTPATIENT)
Dept: ENDOSCOPY | Age: 84
DRG: 871 | End: 2019-01-01
Payer: MEDICARE

## 2019-01-01 ENCOUNTER — HOSPITAL ENCOUNTER (INPATIENT)
Age: 84
LOS: 2 days | Discharge: ANOTHER HOSPITAL | DRG: 177 | End: 2019-04-23
Attending: EMERGENCY MEDICINE | Admitting: INTERNAL MEDICINE
Payer: MEDICARE

## 2019-01-01 ENCOUNTER — ANESTHESIA EVENT (OUTPATIENT)
Dept: ENDOSCOPY | Age: 84
DRG: 871 | End: 2019-01-01
Payer: MEDICARE

## 2019-01-01 ENCOUNTER — HOSPITAL ENCOUNTER (OUTPATIENT)
Age: 84
Discharge: HOME | End: 2019-03-25
Payer: MEDICARE

## 2019-01-01 ENCOUNTER — HOSPITAL ENCOUNTER (OUTPATIENT)
Age: 84
Discharge: HOME | End: 2019-03-27
Payer: MEDICARE

## 2019-01-01 VITALS
OXYGEN SATURATION: 99 % | BODY MASS INDEX: 23.8 KG/M2 | WEIGHT: 170 LBS | OXYGEN SATURATION: 94 % | TEMPERATURE: 98 F | SYSTOLIC BLOOD PRESSURE: 100 MMHG | SYSTOLIC BLOOD PRESSURE: 146 MMHG | HEART RATE: 83 BPM | HEIGHT: 71 IN | RESPIRATION RATE: 20 BRPM | DIASTOLIC BLOOD PRESSURE: 58 MMHG | DIASTOLIC BLOOD PRESSURE: 74 MMHG

## 2019-01-01 VITALS
BODY MASS INDEX: 23.38 KG/M2 | HEART RATE: 73 BPM | TEMPERATURE: 98.9 F | DIASTOLIC BLOOD PRESSURE: 60 MMHG | OXYGEN SATURATION: 97 % | WEIGHT: 167 LBS | RESPIRATION RATE: 20 BRPM | SYSTOLIC BLOOD PRESSURE: 162 MMHG | HEIGHT: 71 IN

## 2019-01-01 VITALS
TEMPERATURE: 98.6 F | BODY MASS INDEX: 23.87 KG/M2 | WEIGHT: 170.5 LBS | HEART RATE: 81 BPM | DIASTOLIC BLOOD PRESSURE: 68 MMHG | SYSTOLIC BLOOD PRESSURE: 142 MMHG | OXYGEN SATURATION: 94 % | RESPIRATION RATE: 18 BRPM | HEIGHT: 71 IN

## 2019-01-01 VITALS
OXYGEN SATURATION: 98 % | RESPIRATION RATE: 18 BRPM | BODY MASS INDEX: 23.52 KG/M2 | SYSTOLIC BLOOD PRESSURE: 116 MMHG | WEIGHT: 168 LBS | HEIGHT: 71 IN | TEMPERATURE: 98.3 F | HEART RATE: 68 BPM | DIASTOLIC BLOOD PRESSURE: 58 MMHG

## 2019-01-01 VITALS
HEART RATE: 90 BPM | OXYGEN SATURATION: 95 % | RESPIRATION RATE: 24 BRPM | TEMPERATURE: 97.7 F | SYSTOLIC BLOOD PRESSURE: 186 MMHG | HEIGHT: 71 IN | BODY MASS INDEX: 21.94 KG/M2 | WEIGHT: 156.75 LBS | DIASTOLIC BLOOD PRESSURE: 93 MMHG

## 2019-01-01 DIAGNOSIS — R53.1 GENERALIZED WEAKNESS: ICD-10-CM

## 2019-01-01 DIAGNOSIS — R05.9 COUGH: Primary | ICD-10-CM

## 2019-01-01 DIAGNOSIS — J18.9 PNEUMONIA DUE TO ORGANISM: Primary | ICD-10-CM

## 2019-01-01 DIAGNOSIS — E87.5 HYPERKALEMIA: ICD-10-CM

## 2019-01-01 DIAGNOSIS — J20.9 ACUTE BRONCHITIS, UNSPECIFIED ORGANISM: ICD-10-CM

## 2019-01-01 DIAGNOSIS — E87.5 HYPERKALEMIA: Primary | ICD-10-CM

## 2019-01-01 DIAGNOSIS — R26.2 AMBULATORY DYSFUNCTION: ICD-10-CM

## 2019-01-01 DIAGNOSIS — R05.9 COUGH: ICD-10-CM

## 2019-01-01 DIAGNOSIS — I71.40 ABDOMINAL AORTIC ANEURYSM (AAA) WITHOUT RUPTURE: ICD-10-CM

## 2019-01-01 DIAGNOSIS — E11.9 DIABETES MELLITUS WITHOUT COMPLICATION (HCC): ICD-10-CM

## 2019-01-01 DIAGNOSIS — J18.9 COMMUNITY ACQUIRED PNEUMONIA OF LEFT LOWER LOBE OF LUNG: Primary | ICD-10-CM

## 2019-01-01 DIAGNOSIS — N39.0 URINARY TRACT INFECTION ASSOCIATED WITH INDWELLING URETHRAL CATHETER, INITIAL ENCOUNTER (HCC): ICD-10-CM

## 2019-01-01 DIAGNOSIS — I71.40 ABDOMINAL AORTIC ANEURYSM (AAA) WITHOUT RUPTURE: Primary | ICD-10-CM

## 2019-01-01 DIAGNOSIS — R77.8 ELEVATED TROPONIN: ICD-10-CM

## 2019-01-01 DIAGNOSIS — I65.23 ASYMPTOMATIC BILATERAL CAROTID ARTERY STENOSIS: Primary | ICD-10-CM

## 2019-01-01 DIAGNOSIS — A41.9 SEPTICEMIA (HCC): ICD-10-CM

## 2019-01-01 DIAGNOSIS — J69.0 ASPIRATION PNEUMONIA OF RIGHT LOWER LOBE, UNSPECIFIED ASPIRATION PNEUMONIA TYPE (HCC): Primary | ICD-10-CM

## 2019-01-01 DIAGNOSIS — J18.9 PNEUMONIA DUE TO INFECTIOUS ORGANISM, UNSPECIFIED LATERALITY, UNSPECIFIED PART OF LUNG: Primary | ICD-10-CM

## 2019-01-01 DIAGNOSIS — T83.511A URINARY TRACT INFECTION ASSOCIATED WITH INDWELLING URETHRAL CATHETER, INITIAL ENCOUNTER (HCC): ICD-10-CM

## 2019-01-01 DIAGNOSIS — I65.23 ASYMPTOMATIC BILATERAL CAROTID ARTERY STENOSIS: ICD-10-CM

## 2019-01-01 LAB
ALBUMIN SERPL-MCNC: 2.9 G/DL (ref 3.5–5.2)
ALBUMIN SERPL-MCNC: 3.2 G/DL (ref 3.5–5.2)
ALBUMIN SERPL-MCNC: 3.3 G/DL (ref 3.5–5.2)
ALBUMIN SERPL-MCNC: 3.5 G/DL (ref 3.5–5.2)
ALP BLD-CCNC: 172 U/L (ref 40–129)
ALP BLD-CCNC: 179 U/L (ref 40–129)
ALP BLD-CCNC: 182 U/L (ref 40–129)
ALP BLD-CCNC: 193 U/L (ref 40–129)
ALT SERPL-CCNC: 19 U/L (ref 0–40)
ALT SERPL-CCNC: 21 U/L (ref 0–40)
ALT SERPL-CCNC: 23 U/L (ref 0–40)
ALT SERPL-CCNC: 29 U/L (ref 0–40)
ANION GAP SERPL CALCULATED.3IONS-SCNC: 10 MMOL/L (ref 7–16)
ANION GAP SERPL CALCULATED.3IONS-SCNC: 11 MMOL/L (ref 7–16)
ANION GAP SERPL CALCULATED.3IONS-SCNC: 12 MMOL/L (ref 7–16)
ANION GAP SERPL CALCULATED.3IONS-SCNC: 13 MMOL/L (ref 7–16)
ANION GAP SERPL CALCULATED.3IONS-SCNC: 8 MMOL/L (ref 7–16)
ANION GAP SERPL CALCULATED.3IONS-SCNC: 8 MMOL/L (ref 7–16)
ANION GAP SERPL CALCULATED.3IONS-SCNC: 9 MMOL/L (ref 7–16)
ANISOCYTOSIS: ABNORMAL
ANISOCYTOSIS: ABNORMAL
AST SERPL-CCNC: 28 U/L (ref 0–39)
AST SERPL-CCNC: 33 U/L (ref 0–39)
AST SERPL-CCNC: 36 U/L (ref 0–39)
AST SERPL-CCNC: 42 U/L (ref 0–39)
BACTERIA: ABNORMAL /HPF
BACTERIA: NORMAL /HPF
BASOPHILIC STIPPLING: ABNORMAL
BASOPHILIC STIPPLING: ABNORMAL
BASOPHILS ABSOLUTE: 0 E9/L (ref 0–0.2)
BASOPHILS ABSOLUTE: 0.01 E9/L (ref 0–0.2)
BASOPHILS ABSOLUTE: 0.01 E9/L (ref 0–0.2)
BASOPHILS ABSOLUTE: 0.02 E9/L (ref 0–0.2)
BASOPHILS ABSOLUTE: 0.02 E9/L (ref 0–0.2)
BASOPHILS ABSOLUTE: 0.03 E9/L (ref 0–0.2)
BASOPHILS ABSOLUTE: 0.03 E9/L (ref 0–0.2)
BASOPHILS RELATIVE PERCENT: 0.1 % (ref 0–2)
BASOPHILS RELATIVE PERCENT: 0.1 % (ref 0–2)
BASOPHILS RELATIVE PERCENT: 0.2 % (ref 0–2)
BASOPHILS RELATIVE PERCENT: 0.4 % (ref 0–2)
BILIRUB SERPL-MCNC: 0.5 MG/DL (ref 0–1.2)
BILIRUB SERPL-MCNC: 0.6 MG/DL (ref 0–1.2)
BILIRUBIN URINE: NEGATIVE
BILIRUBIN URINE: NEGATIVE
BLOOD CULTURE, ROUTINE: NORMAL
BLOOD, URINE: ABNORMAL
BLOOD, URINE: ABNORMAL
BUN BLDV-MCNC: 10 MG/DL (ref 8–23)
BUN BLDV-MCNC: 11 MG/DL (ref 8–23)
BUN BLDV-MCNC: 11 MG/DL (ref 8–23)
BUN BLDV-MCNC: 12 MG/DL (ref 8–23)
BUN BLDV-MCNC: 14 MG/DL (ref 8–23)
BUN BLDV-MCNC: 15 MG/DL (ref 8–23)
BUN BLDV-MCNC: 22 MG/DL (ref 8–23)
BUN BLDV-MCNC: 43 MG/DL (ref 8–23)
BUN BLDV-MCNC: 9 MG/DL (ref 8–23)
BURR CELLS: ABNORMAL
BURR CELLS: ABNORMAL
CALCIUM SERPL-MCNC: 8 MG/DL (ref 8.6–10.2)
CALCIUM SERPL-MCNC: 8 MG/DL (ref 8.6–10.2)
CALCIUM SERPL-MCNC: 8.2 MG/DL (ref 8.6–10.2)
CALCIUM SERPL-MCNC: 8.5 MG/DL (ref 8.6–10.2)
CALCIUM SERPL-MCNC: 9 MG/DL (ref 8.6–10.2)
CHLORIDE BLD-SCNC: 103 MMOL/L (ref 98–107)
CHLORIDE BLD-SCNC: 103 MMOL/L (ref 98–107)
CHLORIDE BLD-SCNC: 104 MMOL/L (ref 98–107)
CHLORIDE BLD-SCNC: 105 MMOL/L (ref 98–107)
CHLORIDE BLD-SCNC: 107 MMOL/L (ref 98–107)
CHLORIDE BLD-SCNC: 109 MMOL/L (ref 98–107)
CHLORIDE BLD-SCNC: 109 MMOL/L (ref 98–107)
CHLORIDE BLD-SCNC: 110 MMOL/L (ref 98–107)
CHLORIDE BLD-SCNC: 112 MMOL/L (ref 98–107)
CHOLESTEROL, FASTING: 90 MG/DL (ref 0–199)
CLARITY: ABNORMAL
CLARITY: CLEAR
CO2: 23 MMOL/L (ref 22–29)
CO2: 24 MMOL/L (ref 22–29)
CO2: 25 MMOL/L (ref 22–29)
CO2: 25 MMOL/L (ref 22–29)
CO2: 26 MMOL/L (ref 22–29)
CO2: 29 MMOL/L (ref 22–29)
CO2: 29 MMOL/L (ref 22–29)
CO2: 30 MMOL/L (ref 22–29)
CO2: 31 MMOL/L (ref 22–29)
COLOR: YELLOW
COLOR: YELLOW
CREAT SERPL-MCNC: 0.9 MG/DL (ref 0.7–1.2)
CREAT SERPL-MCNC: 1 MG/DL (ref 0.7–1.2)
CREAT SERPL-MCNC: 1.2 MG/DL (ref 0.7–1.2)
CREAT SERPL-MCNC: 1.3 MG/DL (ref 0.7–1.2)
CREAT SERPL-MCNC: 1.3 MG/DL (ref 0.7–1.2)
CREAT SERPL-MCNC: 1.7 MG/DL (ref 0.7–1.2)
CULTURE, BLOOD 2: NORMAL
EKG ATRIAL RATE: 110 BPM
EKG ATRIAL RATE: 127 BPM
EKG ATRIAL RATE: 166 BPM
EKG ATRIAL RATE: 75 BPM
EKG P AXIS: 12 DEGREES
EKG P-R INTERVAL: 160 MS
EKG P-R INTERVAL: 198 MS
EKG P-R INTERVAL: 200 MS
EKG Q-T INTERVAL: 368 MS
EKG Q-T INTERVAL: 378 MS
EKG Q-T INTERVAL: 390 MS
EKG Q-T INTERVAL: 390 MS
EKG QRS DURATION: 130 MS
EKG QRS DURATION: 130 MS
EKG QRS DURATION: 134 MS
EKG QRS DURATION: 134 MS
EKG QTC CALCULATION (BAZETT): 435 MS
EKG QTC CALCULATION (BAZETT): 487 MS
EKG QTC CALCULATION (BAZETT): 500 MS
EKG QTC CALCULATION (BAZETT): 534 MS
EKG R AXIS: -123 DEGREES
EKG R AXIS: -57 DEGREES
EKG R AXIS: -62 DEGREES
EKG R AXIS: -66 DEGREES
EKG T AXIS: 14 DEGREES
EKG T AXIS: 21 DEGREES
EKG T AXIS: 27 DEGREES
EKG T AXIS: 58 DEGREES
EKG VENTRICULAR RATE: 100 BPM
EKG VENTRICULAR RATE: 127 BPM
EKG VENTRICULAR RATE: 75 BPM
EKG VENTRICULAR RATE: 99 BPM
EOSINOPHILS ABSOLUTE: 0 E9/L (ref 0.05–0.5)
EOSINOPHILS ABSOLUTE: 0 E9/L (ref 0.05–0.5)
EOSINOPHILS ABSOLUTE: 0.02 E9/L (ref 0.05–0.5)
EOSINOPHILS ABSOLUTE: 0.02 E9/L (ref 0.05–0.5)
EOSINOPHILS ABSOLUTE: 0.06 E9/L (ref 0.05–0.5)
EOSINOPHILS ABSOLUTE: 0.08 E9/L (ref 0.05–0.5)
EOSINOPHILS ABSOLUTE: 0.12 E9/L (ref 0.05–0.5)
EOSINOPHILS RELATIVE PERCENT: 0 % (ref 0–6)
EOSINOPHILS RELATIVE PERCENT: 0 % (ref 0–6)
EOSINOPHILS RELATIVE PERCENT: 0.2 % (ref 0–6)
EOSINOPHILS RELATIVE PERCENT: 0.2 % (ref 0–6)
EOSINOPHILS RELATIVE PERCENT: 0.5 % (ref 0–6)
EOSINOPHILS RELATIVE PERCENT: 1.7 % (ref 0–6)
EOSINOPHILS RELATIVE PERCENT: 2.6 % (ref 0–6)
FILM ARRAY ADENOVIRUS: NORMAL
FILM ARRAY BORDETELLA PERTUSSIS: NORMAL
FILM ARRAY CHLAMYDOPHILIA PNEUMONIAE: NORMAL
FILM ARRAY CORONAVIRUS 229E: NORMAL
FILM ARRAY CORONAVIRUS HKU1: NORMAL
FILM ARRAY CORONAVIRUS NL63: NORMAL
FILM ARRAY CORONAVIRUS OC43: NORMAL
FILM ARRAY INFLUENZA A VIRUS 09H1: NORMAL
FILM ARRAY INFLUENZA A VIRUS H1: NORMAL
FILM ARRAY INFLUENZA A VIRUS H3: NORMAL
FILM ARRAY INFLUENZA A VIRUS: NORMAL
FILM ARRAY INFLUENZA B: NORMAL
FILM ARRAY METAPNEUMOVIRUS: NORMAL
FILM ARRAY MYCOPLASMA PNEUMONIAE: NORMAL
FILM ARRAY PARAINFLUENZA VIRUS 1: NORMAL
FILM ARRAY PARAINFLUENZA VIRUS 2: NORMAL
FILM ARRAY PARAINFLUENZA VIRUS 3: NORMAL
FILM ARRAY PARAINFLUENZA VIRUS 4: NORMAL
FILM ARRAY RESPIRATORY SYNCITIAL VIRUS: NORMAL
FILM ARRAY RHINOVIRUS/ENTEROVIRUS: NORMAL
GFR AFRICAN AMERICAN: 46
GFR AFRICAN AMERICAN: >60
GFR NON-AFRICAN AMERICAN: 38 ML/MIN/1.73
GFR NON-AFRICAN AMERICAN: 52 ML/MIN/1.73
GFR NON-AFRICAN AMERICAN: 52 ML/MIN/1.73
GFR NON-AFRICAN AMERICAN: 57 ML/MIN/1.73
GFR NON-AFRICAN AMERICAN: >60 ML/MIN/1.73
GLUCOSE BLD-MCNC: 110 MG/DL (ref 74–99)
GLUCOSE BLD-MCNC: 123 MG/DL (ref 74–99)
GLUCOSE BLD-MCNC: 128 MG/DL (ref 74–99)
GLUCOSE BLD-MCNC: 140 MG/DL (ref 74–99)
GLUCOSE BLD-MCNC: 163 MG/DL (ref 74–99)
GLUCOSE BLD-MCNC: 196 MG/DL (ref 74–99)
GLUCOSE BLD-MCNC: 251 MG/DL (ref 74–99)
GLUCOSE BLD-MCNC: 90 MG/DL (ref 74–99)
GLUCOSE BLD-MCNC: 94 MG/DL (ref 74–99)
GLUCOSE URINE: NEGATIVE MG/DL
GLUCOSE URINE: NEGATIVE MG/DL
GRAM STAIN ORDERABLE: NORMAL
HBA1C MFR BLD: 5.2 % (ref 4–5.6)
HBA1C MFR BLD: 5.3 % (ref 4–5.6)
HCT VFR BLD CALC: 27.6 % (ref 37–54)
HCT VFR BLD CALC: 28.4 % (ref 37–54)
HCT VFR BLD CALC: 29.4 % (ref 37–54)
HCT VFR BLD CALC: 29.7 % (ref 37–54)
HCT VFR BLD CALC: 30.1 % (ref 37–54)
HCT VFR BLD CALC: 30.1 % (ref 37–54)
HCT VFR BLD CALC: 30.7 % (ref 37–54)
HCT VFR BLD CALC: 32.9 % (ref 37–54)
HCT VFR BLD CALC: 37.1 % (ref 37–54)
HDLC SERPL-MCNC: 28 MG/DL
HEMOGLOBIN: 10.7 G/DL (ref 12.5–16.5)
HEMOGLOBIN: 11.6 G/DL (ref 12.5–16.5)
HEMOGLOBIN: 8.9 G/DL (ref 12.5–16.5)
HEMOGLOBIN: 8.9 G/DL (ref 12.5–16.5)
HEMOGLOBIN: 9.1 G/DL (ref 12.5–16.5)
HEMOGLOBIN: 9.2 G/DL (ref 12.5–16.5)
HEMOGLOBIN: 9.2 G/DL (ref 12.5–16.5)
HEMOGLOBIN: 9.3 G/DL (ref 12.5–16.5)
HEMOGLOBIN: 9.7 G/DL (ref 12.5–16.5)
HYPOCHROMIA: ABNORMAL
IMMATURE GRANULOCYTES #: 0.02 E9/L
IMMATURE GRANULOCYTES #: 0.03 E9/L
IMMATURE GRANULOCYTES #: 0.04 E9/L
IMMATURE GRANULOCYTES #: 0.06 E9/L
IMMATURE GRANULOCYTES #: 0.12 E9/L
IMMATURE GRANULOCYTES #: 0.13 E9/L
IMMATURE GRANULOCYTES %: 0.4 % (ref 0–5)
IMMATURE GRANULOCYTES %: 0.4 % (ref 0–5)
IMMATURE GRANULOCYTES %: 0.5 % (ref 0–5)
IMMATURE GRANULOCYTES %: 0.7 % (ref 0–5)
IMMATURE GRANULOCYTES %: 0.9 % (ref 0–5)
IMMATURE GRANULOCYTES %: 1.1 % (ref 0–5)
INFLUENZA A BY PCR: NOT DETECTED
INFLUENZA B BY PCR: NOT DETECTED
KETONES, URINE: 15 MG/DL
KETONES, URINE: NEGATIVE MG/DL
L. PNEUMOPHILA SEROGP 1 UR AG: NORMAL
LACTIC ACID, SEPSIS: 1.8 MMOL/L (ref 0.5–1.9)
LACTIC ACID: 1.1 MMOL/L (ref 0.5–2.2)
LDL CHOLESTEROL CALCULATED: 39 MG/DL (ref 0–99)
LEUKOCYTE ESTERASE, URINE: ABNORMAL
LEUKOCYTE ESTERASE, URINE: ABNORMAL
LV EF: 60 %
LVEF MODALITY: NORMAL
LYMPHOCYTES ABSOLUTE: 0.31 E9/L (ref 1.5–4)
LYMPHOCYTES ABSOLUTE: 0.4 E9/L (ref 1.5–4)
LYMPHOCYTES ABSOLUTE: 0.55 E9/L (ref 1.5–4)
LYMPHOCYTES ABSOLUTE: 0.6 E9/L (ref 1.5–4)
LYMPHOCYTES ABSOLUTE: 0.72 E9/L (ref 1.5–4)
LYMPHOCYTES ABSOLUTE: 0.76 E9/L (ref 1.5–4)
LYMPHOCYTES ABSOLUTE: 1.05 E9/L (ref 1.5–4)
LYMPHOCYTES RELATIVE PERCENT: 16.7 % (ref 20–42)
LYMPHOCYTES RELATIVE PERCENT: 2.6 % (ref 20–42)
LYMPHOCYTES RELATIVE PERCENT: 4.5 % (ref 20–42)
LYMPHOCYTES RELATIVE PERCENT: 5.4 % (ref 20–42)
LYMPHOCYTES RELATIVE PERCENT: 6.5 % (ref 20–42)
LYMPHOCYTES RELATIVE PERCENT: 8.5 % (ref 20–42)
LYMPHOCYTES RELATIVE PERCENT: 8.7 % (ref 20–42)
MAGNESIUM: 1.6 MG/DL (ref 1.6–2.6)
MAGNESIUM: 1.6 MG/DL (ref 1.6–2.6)
MAGNESIUM: 1.7 MG/DL (ref 1.6–2.6)
MAGNESIUM: 1.8 MG/DL (ref 1.6–2.6)
MAGNESIUM: 1.9 MG/DL (ref 1.6–2.6)
MCH RBC QN AUTO: 30.8 PG (ref 26–35)
MCH RBC QN AUTO: 30.8 PG (ref 26–35)
MCH RBC QN AUTO: 30.9 PG (ref 26–35)
MCH RBC QN AUTO: 31.2 PG (ref 26–35)
MCH RBC QN AUTO: 31.3 PG (ref 26–35)
MCH RBC QN AUTO: 31.3 PG (ref 26–35)
MCH RBC QN AUTO: 31.4 PG (ref 26–35)
MCH RBC QN AUTO: 31.4 PG (ref 26–35)
MCH RBC QN AUTO: 32.3 PG (ref 26–35)
MCHC RBC AUTO-ENTMCNC: 30.2 % (ref 32–34.5)
MCHC RBC AUTO-ENTMCNC: 30.9 % (ref 32–34.5)
MCHC RBC AUTO-ENTMCNC: 31 % (ref 32–34.5)
MCHC RBC AUTO-ENTMCNC: 31.3 % (ref 32–34.5)
MCHC RBC AUTO-ENTMCNC: 31.6 % (ref 32–34.5)
MCHC RBC AUTO-ENTMCNC: 32.2 % (ref 32–34.5)
MCHC RBC AUTO-ENTMCNC: 32.5 % (ref 32–34.5)
MCV RBC AUTO: 100.3 FL (ref 80–99.9)
MCV RBC AUTO: 101 FL (ref 80–99.9)
MCV RBC AUTO: 102 FL (ref 80–99.9)
MCV RBC AUTO: 97.5 FL (ref 80–99.9)
MCV RBC AUTO: 98.7 FL (ref 80–99.9)
MCV RBC AUTO: 99 FL (ref 80–99.9)
MCV RBC AUTO: 99.4 FL (ref 80–99.9)
MCV RBC AUTO: 99.6 FL (ref 80–99.9)
MCV RBC AUTO: 99.7 FL (ref 80–99.9)
METER GLUCOSE: 101 MG/DL (ref 74–99)
METER GLUCOSE: 101 MG/DL (ref 74–99)
METER GLUCOSE: 107 MG/DL (ref 74–99)
METER GLUCOSE: 110 MG/DL (ref 74–99)
METER GLUCOSE: 111 MG/DL (ref 74–99)
METER GLUCOSE: 116 MG/DL (ref 74–99)
METER GLUCOSE: 118 MG/DL (ref 74–99)
METER GLUCOSE: 120 MG/DL (ref 74–99)
METER GLUCOSE: 124 MG/DL (ref 74–99)
METER GLUCOSE: 125 MG/DL (ref 74–99)
METER GLUCOSE: 128 MG/DL (ref 74–99)
METER GLUCOSE: 131 MG/DL (ref 74–99)
METER GLUCOSE: 133 MG/DL (ref 74–99)
METER GLUCOSE: 135 MG/DL (ref 74–99)
METER GLUCOSE: 139 MG/DL (ref 74–99)
METER GLUCOSE: 147 MG/DL (ref 74–99)
METER GLUCOSE: 149 MG/DL (ref 74–99)
METER GLUCOSE: 185 MG/DL (ref 74–99)
METER GLUCOSE: 188 MG/DL (ref 74–99)
METER GLUCOSE: 206 MG/DL (ref 74–99)
METER GLUCOSE: 251 MG/DL (ref 74–99)
METER GLUCOSE: 83 MG/DL (ref 74–99)
METER GLUCOSE: 86 MG/DL (ref 74–99)
METER GLUCOSE: 89 MG/DL (ref 74–99)
MONOCYTES ABSOLUTE: 0.1 E9/L (ref 0.1–0.95)
MONOCYTES ABSOLUTE: 0.39 E9/L (ref 0.1–0.95)
MONOCYTES ABSOLUTE: 0.42 E9/L (ref 0.1–0.95)
MONOCYTES ABSOLUTE: 0.52 E9/L (ref 0.1–0.95)
MONOCYTES ABSOLUTE: 0.57 E9/L (ref 0.1–0.95)
MONOCYTES ABSOLUTE: 0.58 E9/L (ref 0.1–0.95)
MONOCYTES ABSOLUTE: 1.02 E9/L (ref 0.1–0.95)
MONOCYTES RELATIVE PERCENT: 0.9 % (ref 2–12)
MONOCYTES RELATIVE PERCENT: 12.7 % (ref 2–12)
MONOCYTES RELATIVE PERCENT: 3 % (ref 2–12)
MONOCYTES RELATIVE PERCENT: 4.7 % (ref 2–12)
MONOCYTES RELATIVE PERCENT: 5.6 % (ref 2–12)
MONOCYTES RELATIVE PERCENT: 8.4 % (ref 2–12)
MONOCYTES RELATIVE PERCENT: 9 % (ref 2–12)
NEUTROPHILS ABSOLUTE: 10.09 E9/L (ref 1.8–7.3)
NEUTROPHILS ABSOLUTE: 10.25 E9/L (ref 1.8–7.3)
NEUTROPHILS ABSOLUTE: 10.47 E9/L (ref 1.8–7.3)
NEUTROPHILS ABSOLUTE: 11.9 E9/L (ref 1.8–7.3)
NEUTROPHILS ABSOLUTE: 3.05 E9/L (ref 1.8–7.3)
NEUTROPHILS ABSOLUTE: 3.76 E9/L (ref 1.8–7.3)
NEUTROPHILS ABSOLUTE: 8.1 E9/L (ref 1.8–7.3)
NEUTROPHILS RELATIVE PERCENT: 66.9 % (ref 43–80)
NEUTROPHILS RELATIVE PERCENT: 80.2 % (ref 43–80)
NEUTROPHILS RELATIVE PERCENT: 85.1 % (ref 43–80)
NEUTROPHILS RELATIVE PERCENT: 86.5 % (ref 43–80)
NEUTROPHILS RELATIVE PERCENT: 87.1 % (ref 43–80)
NEUTROPHILS RELATIVE PERCENT: 90 % (ref 43–80)
NEUTROPHILS RELATIVE PERCENT: 96.5 % (ref 43–80)
NITRITE, URINE: NEGATIVE
NITRITE, URINE: NEGATIVE
ORGANISM: ABNORMAL
OVALOCYTES: ABNORMAL
PDW BLD-RTO: 13.1 FL (ref 11.5–15)
PDW BLD-RTO: 14.1 FL (ref 11.5–15)
PDW BLD-RTO: 14.2 FL (ref 11.5–15)
PDW BLD-RTO: 14.3 FL (ref 11.5–15)
PDW BLD-RTO: 14.5 FL (ref 11.5–15)
PDW BLD-RTO: 14.6 FL (ref 11.5–15)
PDW BLD-RTO: 14.6 FL (ref 11.5–15)
PH UA: 5 (ref 5–9)
PH UA: 6.5 (ref 5–9)
PHOSPHORUS: 2.6 MG/DL (ref 2.5–4.5)
PLATELET # BLD: 114 E9/L (ref 130–450)
PLATELET # BLD: 125 E9/L (ref 130–450)
PLATELET # BLD: 126 E9/L (ref 130–450)
PLATELET # BLD: 133 E9/L (ref 130–450)
PLATELET # BLD: 139 E9/L (ref 130–450)
PLATELET # BLD: 151 E9/L (ref 130–450)
PLATELET # BLD: 159 E9/L (ref 130–450)
PLATELET # BLD: 163 E9/L (ref 130–450)
PLATELET # BLD: 174 E9/L (ref 130–450)
PMV BLD AUTO: 10.3 FL (ref 7–12)
PMV BLD AUTO: 10.6 FL (ref 7–12)
PMV BLD AUTO: 11 FL (ref 7–12)
PMV BLD AUTO: 11.1 FL (ref 7–12)
PMV BLD AUTO: 11.1 FL (ref 7–12)
PMV BLD AUTO: 11.2 FL (ref 7–12)
PMV BLD AUTO: 11.3 FL (ref 7–12)
POIKILOCYTES: ABNORMAL
POLYCHROMASIA: ABNORMAL
POLYCHROMASIA: ABNORMAL
POTASSIUM REFLEX MAGNESIUM: 4.4 MMOL/L (ref 3.5–5)
POTASSIUM SERPL-SCNC: 3.1 MMOL/L (ref 3.5–5)
POTASSIUM SERPL-SCNC: 3.6 MMOL/L (ref 3.5–5)
POTASSIUM SERPL-SCNC: 3.6 MMOL/L (ref 3.5–5)
POTASSIUM SERPL-SCNC: 4 MMOL/L (ref 3.5–5)
POTASSIUM SERPL-SCNC: 4.3 MMOL/L (ref 3.5–5)
POTASSIUM SERPL-SCNC: 4.7 MMOL/L (ref 3.5–5)
POTASSIUM SERPL-SCNC: 4.8 MMOL/L (ref 3.5–5)
POTASSIUM SERPL-SCNC: 5.5 MMOL/L (ref 3.5–5)
POTASSIUM SERPL-SCNC: 5.6 MMOL/L (ref 3.5–5)
PRO-BNP: 1658 PG/ML (ref 0–450)
PRO-BNP: 4837 PG/ML (ref 0–450)
PROCALCITONIN: 0.36 NG/ML (ref 0–0.08)
PROCALCITONIN: 4.75 NG/ML (ref 0–0.08)
PROTEIN UA: 100 MG/DL
PROTEIN UA: 100 MG/DL
RBC # BLD: 2.83 E12/L (ref 3.8–5.8)
RBC # BLD: 2.85 E12/L (ref 3.8–5.8)
RBC # BLD: 2.94 E12/L (ref 3.8–5.8)
RBC # BLD: 2.95 E12/L (ref 3.8–5.8)
RBC # BLD: 2.98 E12/L (ref 3.8–5.8)
RBC # BLD: 3.02 E12/L (ref 3.8–5.8)
RBC # BLD: 3.1 E12/L (ref 3.8–5.8)
RBC # BLD: 3.31 E12/L (ref 3.8–5.8)
RBC # BLD: 3.7 E12/L (ref 3.8–5.8)
RBC UA: >20 /HPF (ref 0–2)
RBC UA: >20 /HPF (ref 0–2)
SCHISTOCYTES: ABNORMAL
SODIUM BLD-SCNC: 137 MMOL/L (ref 132–146)
SODIUM BLD-SCNC: 139 MMOL/L (ref 132–146)
SODIUM BLD-SCNC: 140 MMOL/L (ref 132–146)
SODIUM BLD-SCNC: 141 MMOL/L (ref 132–146)
SODIUM BLD-SCNC: 146 MMOL/L (ref 132–146)
SODIUM BLD-SCNC: 147 MMOL/L (ref 132–146)
SODIUM BLD-SCNC: 149 MMOL/L (ref 132–146)
SPECIFIC GRAVITY UA: 1.02 (ref 1–1.03)
SPECIFIC GRAVITY UA: 1.02 (ref 1–1.03)
STREP PNEUMONIAE ANTIGEN, URINE: NORMAL
T4 FREE: 1.06 NG/DL (ref 0.93–1.7)
TOTAL CK: 359 U/L (ref 20–200)
TOTAL PROTEIN: 5.8 G/DL (ref 6.4–8.3)
TOTAL PROTEIN: 6.3 G/DL (ref 6.4–8.3)
TOTAL PROTEIN: 6.4 G/DL (ref 6.4–8.3)
TOTAL PROTEIN: 7.1 G/DL (ref 6.4–8.3)
TRIGLYCERIDE, FASTING: 117 MG/DL (ref 0–149)
TROPONIN: 0.01 NG/ML (ref 0–0.03)
TROPONIN: 0.03 NG/ML (ref 0–0.03)
TROPONIN: 0.05 NG/ML (ref 0–0.03)
TSH SERPL DL<=0.05 MIU/L-ACNC: 2 UIU/ML (ref 0.27–4.2)
URINE CULTURE, ROUTINE: ABNORMAL
URINE CULTURE, ROUTINE: ABNORMAL
URINE CULTURE, ROUTINE: NORMAL
UROBILINOGEN, URINE: 0.2 E.U./DL
UROBILINOGEN, URINE: 0.2 E.U./DL
VLDLC SERPL CALC-MCNC: 23 MG/DL
WBC # BLD: 10.4 E9/L (ref 4.5–11.5)
WBC # BLD: 12.1 E9/L (ref 4.5–11.5)
WBC # BLD: 12.1 E9/L (ref 4.5–11.5)
WBC # BLD: 13.2 E9/L (ref 4.5–11.5)
WBC # BLD: 15.4 E9/L (ref 4.5–11.5)
WBC # BLD: 4.6 E9/L (ref 4.5–11.5)
WBC # BLD: 4.7 E9/L (ref 4.5–11.5)
WBC # BLD: 4.8 E9/L (ref 4.5–11.5)
WBC # BLD: 9.3 E9/L (ref 4.5–11.5)
WBC UA: >20 /HPF (ref 0–5)
WBC UA: NORMAL /HPF (ref 0–5)
YEAST: ABNORMAL

## 2019-01-01 PROCEDURE — 87486 CHLMYD PNEUM DNA AMP PROBE: CPT

## 2019-01-01 PROCEDURE — 94640 AIRWAY INHALATION TREATMENT: CPT

## 2019-01-01 PROCEDURE — 2700000000 HC OXYGEN THERAPY PER DAY

## 2019-01-01 PROCEDURE — 97530 THERAPEUTIC ACTIVITIES: CPT

## 2019-01-01 PROCEDURE — 6360000002 HC RX W HCPCS: Performed by: INTERNAL MEDICINE

## 2019-01-01 PROCEDURE — G8482 FLU IMMUNIZE ORDER/ADMIN: HCPCS | Performed by: SURGERY

## 2019-01-01 PROCEDURE — 1036F TOBACCO NON-USER: CPT | Performed by: SURGERY

## 2019-01-01 PROCEDURE — 6360000002 HC RX W HCPCS: Performed by: NURSE PRACTITIONER

## 2019-01-01 PROCEDURE — G8598 ASA/ANTIPLAT THER USED: HCPCS | Performed by: FAMILY MEDICINE

## 2019-01-01 PROCEDURE — 96365 THER/PROPH/DIAG IV INF INIT: CPT

## 2019-01-01 PROCEDURE — 6370000000 HC RX 637 (ALT 250 FOR IP): Performed by: INTERNAL MEDICINE

## 2019-01-01 PROCEDURE — 97162 PT EVAL MOD COMPLEX 30 MIN: CPT

## 2019-01-01 PROCEDURE — 36415 COLL VENOUS BLD VENIPUNCTURE: CPT

## 2019-01-01 PROCEDURE — 2500000003 HC RX 250 WO HCPCS: Performed by: INTERNAL MEDICINE

## 2019-01-01 PROCEDURE — G8420 CALC BMI NORM PARAMETERS: HCPCS | Performed by: SURGERY

## 2019-01-01 PROCEDURE — 2140000000 HC CCU INTERMEDIATE R&B

## 2019-01-01 PROCEDURE — 87633 RESP VIRUS 12-25 TARGETS: CPT

## 2019-01-01 PROCEDURE — 81001 URINALYSIS AUTO W/SCOPE: CPT

## 2019-01-01 PROCEDURE — 80048 BASIC METABOLIC PNL TOTAL CA: CPT

## 2019-01-01 PROCEDURE — 97110 THERAPEUTIC EXERCISES: CPT

## 2019-01-01 PROCEDURE — 71046 X-RAY EXAM CHEST 2 VIEWS: CPT

## 2019-01-01 PROCEDURE — G8598 ASA/ANTIPLAT THER USED: HCPCS | Performed by: SURGERY

## 2019-01-01 PROCEDURE — 87450 HC DIRECT STREP B ANTIGEN: CPT

## 2019-01-01 PROCEDURE — 99232 SBSQ HOSP IP/OBS MODERATE 35: CPT | Performed by: CLINICAL NURSE SPECIALIST

## 2019-01-01 PROCEDURE — 93306 TTE W/DOPPLER COMPLETE: CPT

## 2019-01-01 PROCEDURE — 87205 SMEAR GRAM STAIN: CPT

## 2019-01-01 PROCEDURE — 84484 ASSAY OF TROPONIN QUANT: CPT

## 2019-01-01 PROCEDURE — 84145 PROCALCITONIN (PCT): CPT

## 2019-01-01 PROCEDURE — 87040 BLOOD CULTURE FOR BACTERIA: CPT

## 2019-01-01 PROCEDURE — 80053 COMPREHEN METABOLIC PANEL: CPT

## 2019-01-01 PROCEDURE — 99232 SBSQ HOSP IP/OBS MODERATE 35: CPT | Performed by: NURSE PRACTITIONER

## 2019-01-01 PROCEDURE — 87070 CULTURE OTHR SPECIMN AEROBIC: CPT

## 2019-01-01 PROCEDURE — 93010 ELECTROCARDIOGRAM REPORT: CPT | Performed by: INTERNAL MEDICINE

## 2019-01-01 PROCEDURE — 94669 MECHANICAL CHEST WALL OSCILL: CPT

## 2019-01-01 PROCEDURE — 2580000003 HC RX 258: Performed by: INTERNAL MEDICINE

## 2019-01-01 PROCEDURE — 1123F ACP DISCUSS/DSCN MKR DOCD: CPT | Performed by: SURGERY

## 2019-01-01 PROCEDURE — 83605 ASSAY OF LACTIC ACID: CPT

## 2019-01-01 PROCEDURE — 70551 MRI BRAIN STEM W/O DYE: CPT

## 2019-01-01 PROCEDURE — 74230 X-RAY XM SWLNG FUNCJ C+: CPT

## 2019-01-01 PROCEDURE — 92526 ORAL FUNCTION THERAPY: CPT | Performed by: SPEECH-LANGUAGE PATHOLOGIST

## 2019-01-01 PROCEDURE — 99222 1ST HOSP IP/OBS MODERATE 55: CPT | Performed by: INTERNAL MEDICINE

## 2019-01-01 PROCEDURE — 6370000000 HC RX 637 (ALT 250 FOR IP): Performed by: EMERGENCY MEDICINE

## 2019-01-01 PROCEDURE — 2580000003 HC RX 258: Performed by: NURSE PRACTITIONER

## 2019-01-01 PROCEDURE — 94760 N-INVAS EAR/PLS OXIMETRY 1: CPT

## 2019-01-01 PROCEDURE — 71045 X-RAY EXAM CHEST 1 VIEW: CPT

## 2019-01-01 PROCEDURE — 93880 EXTRACRANIAL BILAT STUDY: CPT

## 2019-01-01 PROCEDURE — 2500000003 HC RX 250 WO HCPCS: Performed by: EMERGENCY MEDICINE

## 2019-01-01 PROCEDURE — 99285 EMERGENCY DEPT VISIT HI MDM: CPT

## 2019-01-01 PROCEDURE — 2580000003 HC RX 258: Performed by: EMERGENCY MEDICINE

## 2019-01-01 PROCEDURE — APPSS180 APP SPLIT SHARED TIME > 60 MINUTES: Performed by: NURSE PRACTITIONER

## 2019-01-01 PROCEDURE — G8427 DOCREV CUR MEDS BY ELIG CLIN: HCPCS | Performed by: SURGERY

## 2019-01-01 PROCEDURE — 82962 GLUCOSE BLOOD TEST: CPT

## 2019-01-01 PROCEDURE — 93005 ELECTROCARDIOGRAM TRACING: CPT | Performed by: EMERGENCY MEDICINE

## 2019-01-01 PROCEDURE — 84443 ASSAY THYROID STIM HORMONE: CPT

## 2019-01-01 PROCEDURE — 94150 VITAL CAPACITY TEST: CPT

## 2019-01-01 PROCEDURE — 84132 ASSAY OF SERUM POTASSIUM: CPT

## 2019-01-01 PROCEDURE — 4040F PNEUMOC VAC/ADMIN/RCVD: CPT | Performed by: SURGERY

## 2019-01-01 PROCEDURE — G8427 DOCREV CUR MEDS BY ELIG CLIN: HCPCS | Performed by: FAMILY MEDICINE

## 2019-01-01 PROCEDURE — 96372 THER/PROPH/DIAG INJ SC/IM: CPT

## 2019-01-01 PROCEDURE — 83880 ASSAY OF NATRIURETIC PEPTIDE: CPT

## 2019-01-01 PROCEDURE — 87798 DETECT AGENT NOS DNA AMP: CPT

## 2019-01-01 PROCEDURE — 1101F PT FALLS ASSESS-DOCD LE1/YR: CPT | Performed by: SURGERY

## 2019-01-01 PROCEDURE — 99232 SBSQ HOSP IP/OBS MODERATE 35: CPT | Performed by: INTERNAL MEDICINE

## 2019-01-01 PROCEDURE — 96368 THER/DIAG CONCURRENT INF: CPT

## 2019-01-01 PROCEDURE — 99223 1ST HOSP IP/OBS HIGH 75: CPT | Performed by: INTERNAL MEDICINE

## 2019-01-01 PROCEDURE — 85025 COMPLETE CBC W/AUTO DIFF WBC: CPT

## 2019-01-01 PROCEDURE — 6370000000 HC RX 637 (ALT 250 FOR IP): Performed by: NURSE PRACTITIONER

## 2019-01-01 PROCEDURE — 97165 OT EVAL LOW COMPLEX 30 MIN: CPT

## 2019-01-01 PROCEDURE — 36415 COLL VENOUS BLD VENIPUNCTURE: CPT | Performed by: FAMILY MEDICINE

## 2019-01-01 PROCEDURE — 97166 OT EVAL MOD COMPLEX 45 MIN: CPT

## 2019-01-01 PROCEDURE — 99223 1ST HOSP IP/OBS HIGH 75: CPT | Performed by: PSYCHIATRY & NEUROLOGY

## 2019-01-01 PROCEDURE — 87088 URINE BACTERIA CULTURE: CPT

## 2019-01-01 PROCEDURE — 83735 ASSAY OF MAGNESIUM: CPT

## 2019-01-01 PROCEDURE — 4040F PNEUMOC VAC/ADMIN/RCVD: CPT | Performed by: FAMILY MEDICINE

## 2019-01-01 PROCEDURE — 87581 M.PNEUMON DNA AMP PROBE: CPT

## 2019-01-01 PROCEDURE — 71250 CT THORAX DX C-: CPT

## 2019-01-01 PROCEDURE — 94668 MNPJ CHEST WALL SBSQ: CPT

## 2019-01-01 PROCEDURE — 7100000000 HC PACU RECOVERY - FIRST 15 MIN: Performed by: INTERNAL MEDICINE

## 2019-01-01 PROCEDURE — G8482 FLU IMMUNIZE ORDER/ADMIN: HCPCS | Performed by: FAMILY MEDICINE

## 2019-01-01 PROCEDURE — 88305 TISSUE EXAM BY PATHOLOGIST: CPT

## 2019-01-01 PROCEDURE — 99223 1ST HOSP IP/OBS HIGH 75: CPT | Performed by: SURGERY

## 2019-01-01 PROCEDURE — 3700000000 HC ANESTHESIA ATTENDED CARE: Performed by: INTERNAL MEDICINE

## 2019-01-01 PROCEDURE — 99214 OFFICE O/P EST MOD 30 MIN: CPT | Performed by: SURGERY

## 2019-01-01 PROCEDURE — 83036 HEMOGLOBIN GLYCOSYLATED A1C: CPT

## 2019-01-01 PROCEDURE — APPSS45 APP SPLIT SHARED TIME 31-45 MINUTES: Performed by: NURSE PRACTITIONER

## 2019-01-01 PROCEDURE — G0378 HOSPITAL OBSERVATION PER HR: HCPCS

## 2019-01-01 PROCEDURE — 99223 1ST HOSP IP/OBS HIGH 75: CPT | Performed by: CLINICAL NURSE SPECIALIST

## 2019-01-01 PROCEDURE — 6370000000 HC RX 637 (ALT 250 FOR IP): Performed by: PSYCHIATRY & NEUROLOGY

## 2019-01-01 PROCEDURE — 6360000002 HC RX W HCPCS

## 2019-01-01 PROCEDURE — 85027 COMPLETE CBC AUTOMATED: CPT

## 2019-01-01 PROCEDURE — G8420 CALC BMI NORM PARAMETERS: HCPCS | Performed by: FAMILY MEDICINE

## 2019-01-01 PROCEDURE — 3609011500 HC BRONCHOSCOPY DIAGNOSTIC OR CELL WASH ONLY: Performed by: INTERNAL MEDICINE

## 2019-01-01 PROCEDURE — 6360000002 HC RX W HCPCS: Performed by: REGISTERED NURSE

## 2019-01-01 PROCEDURE — 2580000003 HC RX 258: Performed by: REGISTERED NURSE

## 2019-01-01 PROCEDURE — G8510 SCR DEP NEG, NO PLAN REQD: HCPCS | Performed by: FAMILY MEDICINE

## 2019-01-01 PROCEDURE — 2060000000 HC ICU INTERMEDIATE R&B

## 2019-01-01 PROCEDURE — 6360000002 HC RX W HCPCS: Performed by: EMERGENCY MEDICINE

## 2019-01-01 PROCEDURE — A0425 GROUND MILEAGE: HCPCS

## 2019-01-01 PROCEDURE — 99213 OFFICE O/P EST LOW 20 MIN: CPT | Performed by: SURGERY

## 2019-01-01 PROCEDURE — 92610 EVALUATE SWALLOWING FUNCTION: CPT

## 2019-01-01 PROCEDURE — 1036F TOBACCO NON-USER: CPT | Performed by: FAMILY MEDICINE

## 2019-01-01 PROCEDURE — 70450 CT HEAD/BRAIN W/O DYE: CPT

## 2019-01-01 PROCEDURE — 87206 SMEAR FLUORESCENT/ACID STAI: CPT

## 2019-01-01 PROCEDURE — 94667 MNPJ CHEST WALL 1ST: CPT

## 2019-01-01 PROCEDURE — 94664 DEMO&/EVAL PT USE INHALER: CPT

## 2019-01-01 PROCEDURE — 7100000001 HC PACU RECOVERY - ADDTL 15 MIN: Performed by: INTERNAL MEDICINE

## 2019-01-01 PROCEDURE — 93005 ELECTROCARDIOGRAM TRACING: CPT | Performed by: INTERNAL MEDICINE

## 2019-01-01 PROCEDURE — 97161 PT EVAL LOW COMPLEX 20 MIN: CPT

## 2019-01-01 PROCEDURE — 2580000003 HC RX 258: Performed by: RADIOLOGY

## 2019-01-01 PROCEDURE — 1123F ACP DISCUSS/DSCN MKR DOCD: CPT | Performed by: FAMILY MEDICINE

## 2019-01-01 PROCEDURE — APPSS30 APP SPLIT SHARED TIME 16-30 MINUTES: Performed by: NURSE PRACTITIONER

## 2019-01-01 PROCEDURE — 80061 LIPID PANEL: CPT

## 2019-01-01 PROCEDURE — 0BC78ZZ EXTIRPATION OF MATTER FROM LEFT MAIN BRONCHUS, VIA NATURAL OR ARTIFICIAL OPENING ENDOSCOPIC: ICD-10-PCS | Performed by: INTERNAL MEDICINE

## 2019-01-01 PROCEDURE — 2709999900 HC NON-CHARGEABLE SUPPLY: Performed by: INTERNAL MEDICINE

## 2019-01-01 PROCEDURE — 3700000001 HC ADD 15 MINUTES (ANESTHESIA): Performed by: INTERNAL MEDICINE

## 2019-01-01 PROCEDURE — APPSS30 APP SPLIT SHARED TIME 16-30 MINUTES: Performed by: PHYSICIAN ASSISTANT

## 2019-01-01 PROCEDURE — 96366 THER/PROPH/DIAG IV INF ADDON: CPT

## 2019-01-01 PROCEDURE — 94660 CPAP INITIATION&MGMT: CPT

## 2019-01-01 PROCEDURE — 6360000004 HC RX CONTRAST MEDICATION: Performed by: RADIOLOGY

## 2019-01-01 PROCEDURE — 84439 ASSAY OF FREE THYROXINE: CPT

## 2019-01-01 PROCEDURE — 84100 ASSAY OF PHOSPHORUS: CPT

## 2019-01-01 PROCEDURE — 82550 ASSAY OF CK (CPK): CPT

## 2019-01-01 PROCEDURE — 92611 MOTION FLUOROSCOPY/SWALLOW: CPT | Performed by: SPEECH-LANGUAGE PATHOLOGIST

## 2019-01-01 PROCEDURE — 93005 ELECTROCARDIOGRAM TRACING: CPT | Performed by: NURSE PRACTITIONER

## 2019-01-01 PROCEDURE — 97535 SELF CARE MNGMENT TRAINING: CPT

## 2019-01-01 PROCEDURE — 99214 OFFICE O/P EST MOD 30 MIN: CPT | Performed by: FAMILY MEDICINE

## 2019-01-01 PROCEDURE — 87502 INFLUENZA DNA AMP PROBE: CPT

## 2019-01-01 PROCEDURE — A0426 ALS 1: HCPCS

## 2019-01-01 PROCEDURE — 74176 CT ABD & PELVIS W/O CONTRAST: CPT

## 2019-01-01 PROCEDURE — 74174 CTA ABD&PLVS W/CONTRAST: CPT

## 2019-01-01 PROCEDURE — 1101F PT FALLS ASSESS-DOCD LE1/YR: CPT | Performed by: FAMILY MEDICINE

## 2019-01-01 PROCEDURE — 96374 THER/PROPH/DIAG INJ IV PUSH: CPT

## 2019-01-01 RX ORDER — FINASTERIDE 5 MG/1
5 TABLET, FILM COATED ORAL DAILY
Status: DISCONTINUED | OUTPATIENT
Start: 2019-01-01 | End: 2019-01-01 | Stop reason: HOSPADM

## 2019-01-01 RX ORDER — DORZOLAMIDE HCL 20 MG/ML
1 SOLUTION/ DROPS OPHTHALMIC 3 TIMES DAILY
Status: DISCONTINUED | OUTPATIENT
Start: 2019-01-01 | End: 2019-01-01 | Stop reason: HOSPADM

## 2019-01-01 RX ORDER — IPRATROPIUM BROMIDE AND ALBUTEROL SULFATE 2.5; .5 MG/3ML; MG/3ML
1 SOLUTION RESPIRATORY (INHALATION)
Status: COMPLETED | OUTPATIENT
Start: 2019-01-01 | End: 2019-01-01

## 2019-01-01 RX ORDER — SODIUM CHLORIDE 9 MG/ML
INJECTION, SOLUTION INTRAVENOUS CONTINUOUS
Status: DISCONTINUED | OUTPATIENT
Start: 2019-01-01 | End: 2019-01-01 | Stop reason: HOSPADM

## 2019-01-01 RX ORDER — DEXAMETHASONE SODIUM PHOSPHATE 10 MG/ML
INJECTION, SOLUTION INTRAMUSCULAR; INTRAVENOUS PRN
Status: DISCONTINUED | OUTPATIENT
Start: 2019-01-01 | End: 2019-01-01 | Stop reason: SDUPTHER

## 2019-01-01 RX ORDER — SODIUM CHLORIDE 0.9 % (FLUSH) 0.9 %
10 SYRINGE (ML) INJECTION EVERY 12 HOURS SCHEDULED
Status: DISCONTINUED | OUTPATIENT
Start: 2019-01-01 | End: 2019-01-01 | Stop reason: HOSPADM

## 2019-01-01 RX ORDER — ASPIRIN 81 MG/1
162 TABLET ORAL DAILY
Status: CANCELLED | OUTPATIENT
Start: 2019-01-01

## 2019-01-01 RX ORDER — ALBUTEROL SULFATE 2.5 MG/3ML
2.5 SOLUTION RESPIRATORY (INHALATION) EVERY 4 HOURS PRN
Status: DISCONTINUED | OUTPATIENT
Start: 2019-01-01 | End: 2019-01-01 | Stop reason: HOSPADM

## 2019-01-01 RX ORDER — RAMIPRIL 2.5 MG/1
2.5 CAPSULE ORAL ONCE
Status: COMPLETED | OUTPATIENT
Start: 2019-01-01 | End: 2019-01-01

## 2019-01-01 RX ORDER — LATANOPROST 50 UG/ML
1 SOLUTION/ DROPS OPHTHALMIC DAILY
Status: DISCONTINUED | OUTPATIENT
Start: 2019-01-01 | End: 2019-01-01 | Stop reason: HOSPADM

## 2019-01-01 RX ORDER — METHYLPREDNISOLONE SODIUM SUCCINATE 125 MG/2ML
40 INJECTION, POWDER, LYOPHILIZED, FOR SOLUTION INTRAMUSCULAR; INTRAVENOUS EVERY 8 HOURS
Status: DISCONTINUED | OUTPATIENT
Start: 2019-01-01 | End: 2019-01-01 | Stop reason: SDUPTHER

## 2019-01-01 RX ORDER — ALBUTEROL SULFATE 2.5 MG/3ML
2.5 SOLUTION RESPIRATORY (INHALATION) EVERY 4 HOURS PRN
Status: CANCELLED | OUTPATIENT
Start: 2019-01-01

## 2019-01-01 RX ORDER — SODIUM CHLORIDE 0.9 % (FLUSH) 0.9 %
10 SYRINGE (ML) INJECTION PRN
Status: DISCONTINUED | OUTPATIENT
Start: 2019-01-01 | End: 2019-01-01 | Stop reason: HOSPADM

## 2019-01-01 RX ORDER — RAMIPRIL 5 MG/1
5 CAPSULE ORAL DAILY
Qty: 30 CAPSULE | Refills: 3
Start: 2019-01-01

## 2019-01-01 RX ORDER — ONDANSETRON 2 MG/ML
4 INJECTION INTRAMUSCULAR; INTRAVENOUS EVERY 6 HOURS PRN
Status: DISCONTINUED | OUTPATIENT
Start: 2019-01-01 | End: 2019-01-01 | Stop reason: HOSPADM

## 2019-01-01 RX ORDER — DEXTROSE MONOHYDRATE 25 G/50ML
12.5 INJECTION, SOLUTION INTRAVENOUS PRN
Status: CANCELLED | OUTPATIENT
Start: 2019-01-01

## 2019-01-01 RX ORDER — TIMOLOL MALEATE 5 MG/ML
1 SOLUTION/ DROPS OPHTHALMIC EVERY 12 HOURS
Status: CANCELLED | OUTPATIENT
Start: 2019-01-01

## 2019-01-01 RX ORDER — ALBUTEROL SULFATE 2.5 MG/3ML
2.5 SOLUTION RESPIRATORY (INHALATION) EVERY 4 HOURS PRN
Qty: 120 EACH | Refills: 3
Start: 2019-01-01 | End: 2019-01-01

## 2019-01-01 RX ORDER — ATORVASTATIN CALCIUM 40 MG/1
80 TABLET, FILM COATED ORAL NIGHTLY
Status: DISCONTINUED | OUTPATIENT
Start: 2019-01-01 | End: 2019-01-01 | Stop reason: HOSPADM

## 2019-01-01 RX ORDER — TAMSULOSIN HYDROCHLORIDE 0.4 MG/1
0.4 CAPSULE ORAL NIGHTLY
Status: DISCONTINUED | OUTPATIENT
Start: 2019-01-01 | End: 2019-01-01 | Stop reason: HOSPADM

## 2019-01-01 RX ORDER — METHYLPREDNISOLONE SODIUM SUCCINATE 40 MG/ML
INJECTION, POWDER, LYOPHILIZED, FOR SOLUTION INTRAMUSCULAR; INTRAVENOUS
Status: COMPLETED
Start: 2019-01-01 | End: 2019-01-01

## 2019-01-01 RX ORDER — LATANOPROST 50 UG/ML
1 SOLUTION/ DROPS OPHTHALMIC NIGHTLY
Status: DISCONTINUED | OUTPATIENT
Start: 2019-01-01 | End: 2019-01-01 | Stop reason: HOSPADM

## 2019-01-01 RX ORDER — ATORVASTATIN CALCIUM 40 MG/1
80 TABLET, FILM COATED ORAL DAILY
Status: CANCELLED | OUTPATIENT
Start: 2019-01-01

## 2019-01-01 RX ORDER — SODIUM CHLORIDE 0.9 % (FLUSH) 0.9 %
10 SYRINGE (ML) INJECTION PRN
Status: CANCELLED | OUTPATIENT
Start: 2019-01-01

## 2019-01-01 RX ORDER — ALBUTEROL SULFATE 2.5 MG/3ML
2.5 SOLUTION RESPIRATORY (INHALATION) EVERY 4 HOURS
Status: DISCONTINUED | OUTPATIENT
Start: 2019-01-01 | End: 2019-01-01

## 2019-01-01 RX ORDER — NICOTINE POLACRILEX 4 MG
15 LOZENGE BUCCAL PRN
Status: DISCONTINUED | OUTPATIENT
Start: 2019-01-01 | End: 2019-01-01 | Stop reason: HOSPADM

## 2019-01-01 RX ORDER — ACETAMINOPHEN 325 MG/1
650 TABLET ORAL EVERY 6 HOURS PRN
Status: DISCONTINUED | OUTPATIENT
Start: 2019-01-01 | End: 2019-01-01 | Stop reason: HOSPADM

## 2019-01-01 RX ORDER — IPRATROPIUM BROMIDE AND ALBUTEROL SULFATE 2.5; .5 MG/3ML; MG/3ML
1 SOLUTION RESPIRATORY (INHALATION)
Status: DISCONTINUED | OUTPATIENT
Start: 2019-01-01 | End: 2019-01-01

## 2019-01-01 RX ORDER — DOCUSATE SODIUM 100 MG/1
200 CAPSULE, LIQUID FILLED ORAL NIGHTLY
Status: DISCONTINUED | OUTPATIENT
Start: 2019-01-01 | End: 2019-01-01 | Stop reason: HOSPADM

## 2019-01-01 RX ORDER — ALBUTEROL SULFATE 2.5 MG/3ML
2.5 SOLUTION RESPIRATORY (INHALATION)
Qty: 120 EACH | Refills: 3 | Status: SHIPPED | OUTPATIENT
Start: 2019-01-01

## 2019-01-01 RX ORDER — SODIUM CHLORIDE 9 MG/ML
INJECTION, SOLUTION INTRAVENOUS CONTINUOUS
Status: DISCONTINUED | OUTPATIENT
Start: 2019-01-01 | End: 2019-01-01

## 2019-01-01 RX ORDER — ASPIRIN 81 MG/1
162 TABLET ORAL DAILY
Status: DISCONTINUED | OUTPATIENT
Start: 2019-01-01 | End: 2019-01-01

## 2019-01-01 RX ORDER — LEVOFLOXACIN 500 MG/1
500 TABLET, FILM COATED ORAL DAILY
Qty: 10 TABLET | Refills: 0 | Status: SHIPPED | OUTPATIENT
Start: 2019-01-01 | End: 2019-01-01

## 2019-01-01 RX ORDER — FINASTERIDE 5 MG/1
5 TABLET, FILM COATED ORAL DAILY
Status: CANCELLED | OUTPATIENT
Start: 2019-01-01

## 2019-01-01 RX ORDER — ACETAMINOPHEN 650 MG/1
650 SUPPOSITORY RECTAL EVERY 4 HOURS PRN
Status: DISCONTINUED | OUTPATIENT
Start: 2019-01-01 | End: 2019-01-01 | Stop reason: HOSPADM

## 2019-01-01 RX ORDER — HYDRALAZINE HYDROCHLORIDE 20 MG/ML
10 INJECTION INTRAMUSCULAR; INTRAVENOUS EVERY 6 HOURS PRN
Status: CANCELLED | OUTPATIENT
Start: 2019-01-01

## 2019-01-01 RX ORDER — DEXTROSE MONOHYDRATE 50 MG/ML
100 INJECTION, SOLUTION INTRAVENOUS PRN
Status: CANCELLED | OUTPATIENT
Start: 2019-01-01

## 2019-01-01 RX ORDER — DORZOLAMIDE HCL 20 MG/ML
1 SOLUTION/ DROPS OPHTHALMIC 3 TIMES DAILY
Status: CANCELLED | OUTPATIENT
Start: 2019-01-01

## 2019-01-01 RX ORDER — TIMOLOL MALEATE 5 MG/ML
1 SOLUTION/ DROPS OPHTHALMIC EVERY 12 HOURS
Status: DISCONTINUED | OUTPATIENT
Start: 2019-01-01 | End: 2019-01-01 | Stop reason: HOSPADM

## 2019-01-01 RX ORDER — DEXTROSE, SODIUM CHLORIDE, AND POTASSIUM CHLORIDE 5; .45; .15 G/100ML; G/100ML; G/100ML
INJECTION INTRAVENOUS CONTINUOUS
Status: DISCONTINUED | OUTPATIENT
Start: 2019-01-01 | End: 2019-01-01 | Stop reason: HOSPADM

## 2019-01-01 RX ORDER — PETROLATUM 42 G/100G
OINTMENT TOPICAL 2 TIMES DAILY PRN
Status: DISCONTINUED | OUTPATIENT
Start: 2019-01-01 | End: 2019-01-01

## 2019-01-01 RX ORDER — BRIMONIDINE TARTRATE 2 MG/ML
1 SOLUTION/ DROPS OPHTHALMIC EVERY 12 HOURS
Status: DISCONTINUED | OUTPATIENT
Start: 2019-01-01 | End: 2019-01-01 | Stop reason: HOSPADM

## 2019-01-01 RX ORDER — METOPROLOL TARTRATE 50 MG/1
50 TABLET, FILM COATED ORAL 2 TIMES DAILY
Status: DISCONTINUED | OUTPATIENT
Start: 2019-01-01 | End: 2019-01-01 | Stop reason: HOSPADM

## 2019-01-01 RX ORDER — NICOTINE POLACRILEX 4 MG
15 LOZENGE BUCCAL PRN
Status: CANCELLED | OUTPATIENT
Start: 2019-01-01

## 2019-01-01 RX ORDER — BISACODYL 10 MG
10 SUPPOSITORY, RECTAL RECTAL DAILY PRN
Status: DISCONTINUED | OUTPATIENT
Start: 2019-01-01 | End: 2019-01-01 | Stop reason: HOSPADM

## 2019-01-01 RX ORDER — LINEZOLID 2 MG/ML
600 INJECTION, SOLUTION INTRAVENOUS EVERY 12 HOURS
Status: DISCONTINUED | OUTPATIENT
Start: 2019-01-01 | End: 2019-01-01 | Stop reason: HOSPADM

## 2019-01-01 RX ORDER — ONDANSETRON 2 MG/ML
4 INJECTION INTRAMUSCULAR; INTRAVENOUS EVERY 6 HOURS PRN
Status: CANCELLED | OUTPATIENT
Start: 2019-01-01

## 2019-01-01 RX ORDER — ATORVASTATIN CALCIUM 40 MG/1
80 TABLET, FILM COATED ORAL DAILY
Status: DISCONTINUED | OUTPATIENT
Start: 2019-01-01 | End: 2019-01-01 | Stop reason: HOSPADM

## 2019-01-01 RX ORDER — BRIMONIDINE TARTRATE 2 MG/ML
1 SOLUTION/ DROPS OPHTHALMIC 2 TIMES DAILY
Status: DISCONTINUED | OUTPATIENT
Start: 2019-01-01 | End: 2019-01-01 | Stop reason: HOSPADM

## 2019-01-01 RX ORDER — ACETAMINOPHEN 325 MG/1
650 TABLET ORAL EVERY 6 HOURS PRN
COMMUNITY

## 2019-01-01 RX ORDER — ALBUTEROL SULFATE 90 UG/1
2 AEROSOL, METERED RESPIRATORY (INHALATION) EVERY 6 HOURS PRN
Qty: 1 INHALER | Refills: 3 | Status: ON HOLD | OUTPATIENT
Start: 2019-01-01 | End: 2019-01-01 | Stop reason: CLARIF

## 2019-01-01 RX ORDER — NITROGLYCERIN 0.4 MG/1
TABLET SUBLINGUAL
Status: DISPENSED
Start: 2019-01-01 | End: 2019-01-01

## 2019-01-01 RX ORDER — BRIMONIDINE TARTRATE, TIMOLOL MALEATE 2; 5 MG/ML; MG/ML
1 SOLUTION/ DROPS OPHTHALMIC EVERY 12 HOURS
Status: DISCONTINUED | OUTPATIENT
Start: 2019-01-01 | End: 2019-01-01 | Stop reason: CLARIF

## 2019-01-01 RX ORDER — SODIUM CHLORIDE 9 MG/ML
INJECTION, SOLUTION INTRAVENOUS CONTINUOUS PRN
Status: DISCONTINUED | OUTPATIENT
Start: 2019-01-01 | End: 2019-01-01 | Stop reason: SDUPTHER

## 2019-01-01 RX ORDER — ALBUTEROL SULFATE 2.5 MG/3ML
2.5 SOLUTION RESPIRATORY (INHALATION) 4 TIMES DAILY
Status: COMPLETED | OUTPATIENT
Start: 2019-01-01 | End: 2019-01-01

## 2019-01-01 RX ORDER — CLOPIDOGREL BISULFATE 75 MG/1
75 TABLET ORAL DAILY
Status: DISCONTINUED | OUTPATIENT
Start: 2019-01-01 | End: 2019-01-01 | Stop reason: HOSPADM

## 2019-01-01 RX ORDER — TIMOLOL MALEATE 5 MG/ML
1 SOLUTION/ DROPS OPHTHALMIC 2 TIMES DAILY
Status: DISCONTINUED | OUTPATIENT
Start: 2019-01-01 | End: 2019-01-01 | Stop reason: HOSPADM

## 2019-01-01 RX ORDER — FLUCONAZOLE 2 MG/ML
200 INJECTION, SOLUTION INTRAVENOUS EVERY 24 HOURS
Status: DISCONTINUED | OUTPATIENT
Start: 2019-01-01 | End: 2019-01-01 | Stop reason: HOSPADM

## 2019-01-01 RX ORDER — METOPROLOL TARTRATE 50 MG/1
50 TABLET, FILM COATED ORAL 2 TIMES DAILY
Status: CANCELLED | OUTPATIENT
Start: 2019-01-01

## 2019-01-01 RX ORDER — RAMIPRIL 2.5 MG/1
5 CAPSULE ORAL DAILY
Qty: 30 CAPSULE | Refills: 3
Start: 2019-01-01 | End: 2019-01-01 | Stop reason: HOSPADM

## 2019-01-01 RX ORDER — HYDRALAZINE HYDROCHLORIDE 20 MG/ML
10 INJECTION INTRAMUSCULAR; INTRAVENOUS EVERY 6 HOURS PRN
Status: DISCONTINUED | OUTPATIENT
Start: 2019-01-01 | End: 2019-01-01 | Stop reason: HOSPADM

## 2019-01-01 RX ORDER — NITROGLYCERIN 0.4 MG/1
0.4 TABLET SUBLINGUAL EVERY 5 MIN PRN
Status: DISCONTINUED | OUTPATIENT
Start: 2019-01-01 | End: 2019-01-01 | Stop reason: HOSPADM

## 2019-01-01 RX ORDER — NITROGLYCERIN 0.4 MG/1
0.4 TABLET SUBLINGUAL EVERY 5 MIN PRN
Status: CANCELLED | OUTPATIENT
Start: 2019-01-01

## 2019-01-01 RX ORDER — POTASSIUM CHLORIDE 7.45 MG/ML
10 INJECTION INTRAVENOUS
Status: COMPLETED | OUTPATIENT
Start: 2019-01-01 | End: 2019-01-01

## 2019-01-01 RX ORDER — RAMIPRIL 5 MG/1
5 CAPSULE ORAL DAILY
Status: DISCONTINUED | OUTPATIENT
Start: 2019-01-01 | End: 2019-01-01 | Stop reason: HOSPADM

## 2019-01-01 RX ORDER — BRIMONIDINE TARTRATE 2 MG/ML
1 SOLUTION/ DROPS OPHTHALMIC EVERY 12 HOURS
Status: CANCELLED | OUTPATIENT
Start: 2019-01-01

## 2019-01-01 RX ORDER — METOPROLOL TARTRATE 50 MG/1
TABLET, FILM COATED ORAL
Qty: 60 TABLET | Refills: 3 | Status: SHIPPED | OUTPATIENT
Start: 2019-01-01

## 2019-01-01 RX ORDER — PROPOFOL 10 MG/ML
INJECTION, EMULSION INTRAVENOUS PRN
Status: DISCONTINUED | OUTPATIENT
Start: 2019-01-01 | End: 2019-01-01 | Stop reason: SDUPTHER

## 2019-01-01 RX ORDER — ASPIRIN 81 MG/1
162 TABLET ORAL DAILY
Status: DISCONTINUED | OUTPATIENT
Start: 2019-01-01 | End: 2019-01-01 | Stop reason: HOSPADM

## 2019-01-01 RX ORDER — IPRATROPIUM BROMIDE AND ALBUTEROL SULFATE 2.5; .5 MG/3ML; MG/3ML
1 SOLUTION RESPIRATORY (INHALATION) EVERY 8 HOURS
Status: DISCONTINUED | OUTPATIENT
Start: 2019-01-01 | End: 2019-01-01 | Stop reason: HOSPADM

## 2019-01-01 RX ORDER — ACETAMINOPHEN 325 MG/1
650 TABLET ORAL EVERY 4 HOURS PRN
Status: CANCELLED | OUTPATIENT
Start: 2019-01-01

## 2019-01-01 RX ORDER — METOPROLOL TARTRATE 5 MG/5ML
5 INJECTION INTRAVENOUS EVERY 6 HOURS
Status: DISCONTINUED | OUTPATIENT
Start: 2019-01-01 | End: 2019-01-01 | Stop reason: HOSPADM

## 2019-01-01 RX ORDER — DEXTROSE MONOHYDRATE 50 MG/ML
100 INJECTION, SOLUTION INTRAVENOUS PRN
Status: DISCONTINUED | OUTPATIENT
Start: 2019-01-01 | End: 2019-01-01 | Stop reason: HOSPADM

## 2019-01-01 RX ORDER — SODIUM CHLORIDE 9 MG/ML
INJECTION, SOLUTION INTRAVENOUS CONTINUOUS
Status: CANCELLED | OUTPATIENT
Start: 2019-01-01

## 2019-01-01 RX ORDER — RAMIPRIL 2.5 MG/1
2.5 CAPSULE ORAL DAILY
Status: DISCONTINUED | OUTPATIENT
Start: 2019-01-01 | End: 2019-01-01

## 2019-01-01 RX ORDER — POTASSIUM CHLORIDE 7.45 MG/ML
10 INJECTION INTRAVENOUS
Status: DISCONTINUED | OUTPATIENT
Start: 2019-01-01 | End: 2019-01-01 | Stop reason: SDUPTHER

## 2019-01-01 RX ORDER — ASPIRIN 81 MG/1
81 TABLET ORAL DAILY
Status: DISCONTINUED | OUTPATIENT
Start: 2019-01-01 | End: 2019-01-01 | Stop reason: HOSPADM

## 2019-01-01 RX ORDER — TAMSULOSIN HYDROCHLORIDE 0.4 MG/1
0.4 CAPSULE ORAL NIGHTLY
Status: CANCELLED | OUTPATIENT
Start: 2019-01-01

## 2019-01-01 RX ORDER — FEXOFENADINE HCL 180 MG/1
180 TABLET ORAL DAILY
Status: ON HOLD | COMMUNITY
End: 2019-01-01

## 2019-01-01 RX ORDER — AMOXICILLIN AND CLAVULANATE POTASSIUM 875; 125 MG/1; MG/1
1 TABLET, FILM COATED ORAL 2 TIMES DAILY
Qty: 14 TABLET | Refills: 0 | Status: ON HOLD
Start: 2019-01-01 | End: 2019-01-01

## 2019-01-01 RX ORDER — LATANOPROST 50 UG/ML
1 SOLUTION/ DROPS OPHTHALMIC DAILY
Status: CANCELLED | OUTPATIENT
Start: 2019-01-01

## 2019-01-01 RX ORDER — DORZOLAMIDE HCL 20 MG/ML
1 SOLUTION/ DROPS OPHTHALMIC 2 TIMES DAILY
Status: DISCONTINUED | OUTPATIENT
Start: 2019-01-01 | End: 2019-01-01 | Stop reason: HOSPADM

## 2019-01-01 RX ORDER — ASPIRIN 81 MG/1
81 TABLET ORAL DAILY
Qty: 30 TABLET | Refills: 3 | COMMUNITY
Start: 2019-01-01

## 2019-01-01 RX ORDER — DEXTROSE MONOHYDRATE 25 G/50ML
12.5 INJECTION, SOLUTION INTRAVENOUS PRN
Status: DISCONTINUED | OUTPATIENT
Start: 2019-01-01 | End: 2019-01-01 | Stop reason: HOSPADM

## 2019-01-01 RX ORDER — PETROLATUM 42 G/100G
OINTMENT TOPICAL 3 TIMES DAILY
Status: DISCONTINUED | OUTPATIENT
Start: 2019-01-01 | End: 2019-01-01 | Stop reason: HOSPADM

## 2019-01-01 RX ORDER — CARBOXYMETHYLCELLULOSE SODIUM 10 MG/ML
1 GEL OPHTHALMIC 2 TIMES DAILY
Status: ON HOLD | COMMUNITY
End: 2019-01-01 | Stop reason: HOSPADM

## 2019-01-01 RX ORDER — METHYLPREDNISOLONE SODIUM SUCCINATE 40 MG/ML
40 INJECTION, POWDER, LYOPHILIZED, FOR SOLUTION INTRAMUSCULAR; INTRAVENOUS EVERY 8 HOURS
Status: DISCONTINUED | OUTPATIENT
Start: 2019-01-01 | End: 2019-01-01 | Stop reason: HOSPADM

## 2019-01-01 RX ORDER — ALBUTEROL SULFATE 90 UG/1
2 AEROSOL, METERED RESPIRATORY (INHALATION) EVERY 6 HOURS PRN
Status: DISCONTINUED | OUTPATIENT
Start: 2019-01-01 | End: 2019-01-01

## 2019-01-01 RX ORDER — GUAIFENESIN 400 MG/1
400 TABLET ORAL 3 TIMES DAILY
Status: DISCONTINUED | OUTPATIENT
Start: 2019-01-01 | End: 2019-01-01 | Stop reason: HOSPADM

## 2019-01-01 RX ORDER — 0.9 % SODIUM CHLORIDE 0.9 %
30 INTRAVENOUS SOLUTION INTRAVENOUS ONCE
Status: COMPLETED | OUTPATIENT
Start: 2019-01-01 | End: 2019-01-01

## 2019-01-01 RX ORDER — ACETAMINOPHEN 325 MG/1
650 TABLET ORAL EVERY 4 HOURS PRN
Status: DISCONTINUED | OUTPATIENT
Start: 2019-01-01 | End: 2019-01-01 | Stop reason: HOSPADM

## 2019-01-01 RX ORDER — NITROGLYCERIN 0.4 MG/1
TABLET SUBLINGUAL
Qty: 25 TABLET | Refills: 3
Start: 2019-01-01

## 2019-01-01 RX ORDER — PETROLATUM 42 G/100G
OINTMENT TOPICAL 3 TIMES DAILY PRN
Status: DISCONTINUED | OUTPATIENT
Start: 2019-01-01 | End: 2019-01-01 | Stop reason: HOSPADM

## 2019-01-01 RX ORDER — CHLORHEXIDINE GLUCONATE 0.12 MG/ML
15 RINSE ORAL 2 TIMES DAILY
Status: DISCONTINUED | OUTPATIENT
Start: 2019-01-01 | End: 2019-01-01 | Stop reason: HOSPADM

## 2019-01-01 RX ORDER — SODIUM CHLORIDE 0.9 % (FLUSH) 0.9 %
10 SYRINGE (ML) INJECTION EVERY 12 HOURS SCHEDULED
Status: CANCELLED | OUTPATIENT
Start: 2019-01-01

## 2019-01-01 RX ORDER — DOCUSATE SODIUM 100 MG/1
200 CAPSULE, LIQUID FILLED ORAL NIGHTLY
Status: CANCELLED | OUTPATIENT
Start: 2019-01-01

## 2019-01-01 RX ORDER — METOPROLOL TARTRATE 50 MG/1
50 TABLET, FILM COATED ORAL 2 TIMES DAILY
Status: DISCONTINUED | OUTPATIENT
Start: 2019-01-01 | End: 2019-01-01

## 2019-01-01 RX ADMIN — ASPIRIN 162 MG: 81 TABLET ORAL at 11:16

## 2019-01-01 RX ADMIN — LATANOPROST 1 DROP: 50 SOLUTION OPHTHALMIC at 22:09

## 2019-01-01 RX ADMIN — DORZOLAMIDE HYDROCHLORIDE 1 DROP: 20 SOLUTION/ DROPS OPHTHALMIC at 21:26

## 2019-01-01 RX ADMIN — BRIMONIDINE TARTRATE 1 DROP: 2 SOLUTION OPHTHALMIC at 08:41

## 2019-01-01 RX ADMIN — LATANOPROST 1 DROP: 50 SOLUTION OPHTHALMIC at 08:04

## 2019-01-01 RX ADMIN — ASPIRIN 162 MG: 81 TABLET, COATED ORAL at 08:04

## 2019-01-01 RX ADMIN — TAMSULOSIN HYDROCHLORIDE 0.4 MG: 0.4 CAPSULE ORAL at 21:14

## 2019-01-01 RX ADMIN — Medication 10 ML: at 15:23

## 2019-01-01 RX ADMIN — DEXAMETHASONE SODIUM PHOSPHATE 10 MG: 10 INJECTION INTRAMUSCULAR; INTRAVENOUS at 13:41

## 2019-01-01 RX ADMIN — RAMIPRIL 2.5 MG: 2.5 CAPSULE ORAL at 16:14

## 2019-01-01 RX ADMIN — AMPICILLIN SODIUM AND SULBACTAM SODIUM 3 G: 2; 1 INJECTION, POWDER, FOR SOLUTION INTRAMUSCULAR; INTRAVENOUS at 18:07

## 2019-01-01 RX ADMIN — BARIUM SULFATE 45 G: 0.6 CREAM ORAL at 13:12

## 2019-01-01 RX ADMIN — AMPICILLIN SODIUM AND SULBACTAM SODIUM 3 G: 2; 1 INJECTION, POWDER, FOR SOLUTION INTRAMUSCULAR; INTRAVENOUS at 06:10

## 2019-01-01 RX ADMIN — ATORVASTATIN CALCIUM 80 MG: 40 TABLET, FILM COATED ORAL at 08:54

## 2019-01-01 RX ADMIN — BARIUM SULFATE 44 ML: 960 POWDER, FOR SUSPENSION ORAL at 13:12

## 2019-01-01 RX ADMIN — ENOXAPARIN SODIUM 40 MG: 40 INJECTION SUBCUTANEOUS at 09:19

## 2019-01-01 RX ADMIN — DORZOLAMIDE HYDROCHLORIDE 1 DROP: 20 SOLUTION/ DROPS OPHTHALMIC at 22:11

## 2019-01-01 RX ADMIN — BRIMONIDINE TARTRATE 1 DROP: 2 SOLUTION OPHTHALMIC at 21:28

## 2019-01-01 RX ADMIN — METOPROLOL TARTRATE 50 MG: 50 TABLET, FILM COATED ORAL at 20:16

## 2019-01-01 RX ADMIN — ENOXAPARIN SODIUM 40 MG: 40 INJECTION SUBCUTANEOUS at 08:05

## 2019-01-01 RX ADMIN — LINEZOLID 600 MG: 600 INJECTION, SOLUTION INTRAVENOUS at 00:19

## 2019-01-01 RX ADMIN — METHYLPREDNISOLONE SODIUM SUCCINATE 40 MG: 40 INJECTION, POWDER, FOR SOLUTION INTRAMUSCULAR; INTRAVENOUS at 14:06

## 2019-01-01 RX ADMIN — DORZOLAMIDE HYDROCHLORIDE 1 DROP: 20 SOLUTION/ DROPS OPHTHALMIC at 13:25

## 2019-01-01 RX ADMIN — DORZOLAMIDE HYDROCHLORIDE 1 DROP: 20 SOLUTION/ DROPS OPHTHALMIC at 21:04

## 2019-01-01 RX ADMIN — TIMOLOL MALEATE 1 DROP: 5 SOLUTION OPHTHALMIC at 21:15

## 2019-01-01 RX ADMIN — METOPROLOL TARTRATE 5 MG: 1 INJECTION, SOLUTION INTRAVENOUS at 22:12

## 2019-01-01 RX ADMIN — CEFEPIME HYDROCHLORIDE 1 G: 1 INJECTION, POWDER, FOR SOLUTION INTRAMUSCULAR; INTRAVENOUS at 06:34

## 2019-01-01 RX ADMIN — INSULIN LISPRO 4 UNITS: 100 INJECTION, SOLUTION INTRAVENOUS; SUBCUTANEOUS at 11:39

## 2019-01-01 RX ADMIN — BRIMONIDINE TARTRATE 1 DROP: 2 SOLUTION/ DROPS OPHTHALMIC at 00:35

## 2019-01-01 RX ADMIN — VANCOMYCIN HYDROCHLORIDE 2000 MG: 10 INJECTION, POWDER, LYOPHILIZED, FOR SOLUTION INTRAVENOUS at 19:22

## 2019-01-01 RX ADMIN — DORZOLAMIDE HYDROCHLORIDE 1 DROP: 20 SOLUTION/ DROPS OPHTHALMIC at 11:22

## 2019-01-01 RX ADMIN — METOPROLOL TARTRATE 50 MG: 50 TABLET, FILM COATED ORAL at 20:22

## 2019-01-01 RX ADMIN — BRIMONIDINE TARTRATE 1 DROP: 2 SOLUTION OPHTHALMIC at 20:23

## 2019-01-01 RX ADMIN — Medication 10 ML: at 10:57

## 2019-01-01 RX ADMIN — SODIUM CHLORIDE: 9 INJECTION, SOLUTION INTRAVENOUS at 15:22

## 2019-01-01 RX ADMIN — ASPIRIN 162 MG: 81 TABLET, COATED ORAL at 15:33

## 2019-01-01 RX ADMIN — ALBUTEROL SULFATE 2.5 MG: 2.5 SOLUTION RESPIRATORY (INHALATION) at 12:04

## 2019-01-01 RX ADMIN — METOPROLOL TARTRATE 5 MG: 1 INJECTION, SOLUTION INTRAVENOUS at 05:40

## 2019-01-01 RX ADMIN — DEXTROSE, SODIUM CHLORIDE, AND POTASSIUM CHLORIDE: 5; .45; .15 INJECTION INTRAVENOUS at 18:07

## 2019-01-01 RX ADMIN — BRIMONIDINE TARTRATE 1 DROP: 2 SOLUTION OPHTHALMIC at 20:12

## 2019-01-01 RX ADMIN — ENOXAPARIN SODIUM 40 MG: 40 INJECTION SUBCUTANEOUS at 08:42

## 2019-01-01 RX ADMIN — LATANOPROST 1 DROP: 50 SOLUTION OPHTHALMIC at 09:19

## 2019-01-01 RX ADMIN — AMPICILLIN SODIUM AND SULBACTAM SODIUM 3 G: 2; 1 INJECTION, POWDER, FOR SOLUTION INTRAMUSCULAR; INTRAVENOUS at 18:08

## 2019-01-01 RX ADMIN — CLOPIDOGREL 75 MG: 75 TABLET, FILM COATED ORAL at 08:40

## 2019-01-01 RX ADMIN — Medication 10 ML: at 20:11

## 2019-01-01 RX ADMIN — AMPICILLIN SODIUM AND SULBACTAM SODIUM 3 G: 2; 1 INJECTION, POWDER, FOR SOLUTION INTRAMUSCULAR; INTRAVENOUS at 06:15

## 2019-01-01 RX ADMIN — METHYLPREDNISOLONE SODIUM SUCCINATE 40 MG: 40 INJECTION, POWDER, LYOPHILIZED, FOR SOLUTION INTRAMUSCULAR; INTRAVENOUS at 14:06

## 2019-01-01 RX ADMIN — INSULIN LISPRO 2 UNITS: 100 INJECTION, SOLUTION INTRAVENOUS; SUBCUTANEOUS at 16:48

## 2019-01-01 RX ADMIN — METOPROLOL TARTRATE 50 MG: 50 TABLET ORAL at 08:53

## 2019-01-01 RX ADMIN — RAMIPRIL 2.5 MG: 2.5 CAPSULE ORAL at 09:20

## 2019-01-01 RX ADMIN — ENOXAPARIN SODIUM 40 MG: 40 INJECTION SUBCUTANEOUS at 11:22

## 2019-01-01 RX ADMIN — TIMOLOL MALEATE 1 DROP: 5 SOLUTION OPHTHALMIC at 21:25

## 2019-01-01 RX ADMIN — Medication 10 ML: at 21:29

## 2019-01-01 RX ADMIN — BRIMONIDINE TARTRATE 1 DROP: 2 SOLUTION OPHTHALMIC at 20:17

## 2019-01-01 RX ADMIN — BRIMONIDINE TARTRATE 1 DROP: 2 SOLUTION/ DROPS OPHTHALMIC at 10:38

## 2019-01-01 RX ADMIN — Medication 10 ML: at 08:05

## 2019-01-01 RX ADMIN — CEFTRIAXONE 2 G: 2 INJECTION, POWDER, FOR SOLUTION INTRAMUSCULAR; INTRAVENOUS at 13:27

## 2019-01-01 RX ADMIN — AZITHROMYCIN MONOHYDRATE 500 MG: 500 INJECTION, POWDER, LYOPHILIZED, FOR SOLUTION INTRAVENOUS at 00:29

## 2019-01-01 RX ADMIN — METOPROLOL TARTRATE 50 MG: 50 TABLET, FILM COATED ORAL at 21:29

## 2019-01-01 RX ADMIN — Medication 10 ML: at 21:03

## 2019-01-01 RX ADMIN — FLUCONAZOLE, SODIUM CHLORIDE 200 MG: 2 INJECTION INTRAVENOUS at 13:11

## 2019-01-01 RX ADMIN — DORZOLAMIDE HYDROCHLORIDE 1 DROP: 20 SOLUTION/ DROPS OPHTHALMIC at 08:57

## 2019-01-01 RX ADMIN — METHYLPREDNISOLONE SODIUM SUCCINATE 40 MG: 125 INJECTION, POWDER, FOR SOLUTION INTRAMUSCULAR; INTRAVENOUS at 05:40

## 2019-01-01 RX ADMIN — DEXTROSE, SODIUM CHLORIDE, AND POTASSIUM CHLORIDE: 5; .45; .15 INJECTION INTRAVENOUS at 11:24

## 2019-01-01 RX ADMIN — AMPICILLIN SODIUM AND SULBACTAM SODIUM 3 G: 2; 1 INJECTION, POWDER, FOR SOLUTION INTRAMUSCULAR; INTRAVENOUS at 00:19

## 2019-01-01 RX ADMIN — IPRATROPIUM BROMIDE AND ALBUTEROL SULFATE 1 AMPULE: .5; 3 SOLUTION RESPIRATORY (INHALATION) at 09:05

## 2019-01-01 RX ADMIN — DORZOLAMIDE HYDROCHLORIDE 1 DROP: 20 SOLUTION/ DROPS OPHTHALMIC at 12:23

## 2019-01-01 RX ADMIN — FINASTERIDE 5 MG: 5 TABLET, FILM COATED ORAL at 08:40

## 2019-01-01 RX ADMIN — FLUCONAZOLE, SODIUM CHLORIDE 200 MG: 2 INJECTION INTRAVENOUS at 15:03

## 2019-01-01 RX ADMIN — CEFEPIME HYDROCHLORIDE 1 G: 1 INJECTION, POWDER, FOR SOLUTION INTRAMUSCULAR; INTRAVENOUS at 15:22

## 2019-01-01 RX ADMIN — METOPROLOL TARTRATE 50 MG: 50 TABLET ORAL at 21:14

## 2019-01-01 RX ADMIN — HYDRALAZINE HYDROCHLORIDE 10 MG: 20 INJECTION INTRAMUSCULAR; INTRAVENOUS at 15:44

## 2019-01-01 RX ADMIN — AMPICILLIN AND SULBACTAM 3 G: 100; 50 INJECTION, POWDER, FOR SOLUTION INTRAVENOUS at 17:43

## 2019-01-01 RX ADMIN — Medication 10 ML: at 11:46

## 2019-01-01 RX ADMIN — TIMOLOL MALEATE 1 DROP: 5 SOLUTION OPHTHALMIC at 20:17

## 2019-01-01 RX ADMIN — ACETAMINOPHEN 650 MG: 325 TABLET, FILM COATED ORAL at 08:47

## 2019-01-01 RX ADMIN — BRIMONIDINE TARTRATE 1 DROP: 2 SOLUTION/ DROPS OPHTHALMIC at 09:54

## 2019-01-01 RX ADMIN — IPRATROPIUM BROMIDE AND ALBUTEROL SULFATE 1 AMPULE: .5; 3 SOLUTION RESPIRATORY (INHALATION) at 17:39

## 2019-01-01 RX ADMIN — METOPROLOL TARTRATE 50 MG: 50 TABLET, FILM COATED ORAL at 08:57

## 2019-01-01 RX ADMIN — INSULIN LISPRO 2 UNITS: 100 INJECTION, SOLUTION INTRAVENOUS; SUBCUTANEOUS at 11:38

## 2019-01-01 RX ADMIN — TIMOLOL MALEATE 1 DROP: 5 SOLUTION OPHTHALMIC at 08:40

## 2019-01-01 RX ADMIN — BRIMONIDINE TARTRATE 1 DROP: 2 SOLUTION/ DROPS OPHTHALMIC at 21:04

## 2019-01-01 RX ADMIN — FINASTERIDE 5 MG: 5 TABLET, FILM COATED ORAL at 08:58

## 2019-01-01 RX ADMIN — FLUCONAZOLE, SODIUM CHLORIDE 200 MG: 2 INJECTION INTRAVENOUS at 15:22

## 2019-01-01 RX ADMIN — PROPOFOL 90 MG: 10 INJECTION, EMULSION INTRAVENOUS at 13:07

## 2019-01-01 RX ADMIN — ASPIRIN 162 MG: 81 TABLET, COATED ORAL at 08:53

## 2019-01-01 RX ADMIN — LATANOPROST 1 DROP: 50 SOLUTION OPHTHALMIC at 08:41

## 2019-01-01 RX ADMIN — DORZOLAMIDE HYDROCHLORIDE 1 DROP: 20 SOLUTION/ DROPS OPHTHALMIC at 20:17

## 2019-01-01 RX ADMIN — ENOXAPARIN SODIUM 40 MG: 40 INJECTION SUBCUTANEOUS at 08:54

## 2019-01-01 RX ADMIN — BISACODYL 10 MG: 10 SUPPOSITORY RECTAL at 17:35

## 2019-01-01 RX ADMIN — LINEZOLID 600 MG: 600 INJECTION, SOLUTION INTRAVENOUS at 14:54

## 2019-01-01 RX ADMIN — TIMOLOL MALEATE 1 DROP: 5 SOLUTION OPHTHALMIC at 08:55

## 2019-01-01 RX ADMIN — BRIMONIDINE TARTRATE 1 DROP: 2 SOLUTION/ DROPS OPHTHALMIC at 22:11

## 2019-01-01 RX ADMIN — METOPROLOL TARTRATE 50 MG: 50 TABLET, FILM COATED ORAL at 09:20

## 2019-01-01 RX ADMIN — PETROLATUM: 42 OINTMENT TOPICAL at 15:04

## 2019-01-01 RX ADMIN — TIMOLOL MALEATE 1 DROP: 5 SOLUTION OPHTHALMIC at 00:35

## 2019-01-01 RX ADMIN — IPRATROPIUM BROMIDE AND ALBUTEROL SULFATE 1 AMPULE: .5; 3 SOLUTION RESPIRATORY (INHALATION) at 12:07

## 2019-01-01 RX ADMIN — TIMOLOL MALEATE 1 DROP: 5 SOLUTION OPHTHALMIC at 22:10

## 2019-01-01 RX ADMIN — AMPICILLIN SODIUM AND SULBACTAM SODIUM 3 G: 2; 1 INJECTION, POWDER, FOR SOLUTION INTRAMUSCULAR; INTRAVENOUS at 00:36

## 2019-01-01 RX ADMIN — DORZOLAMIDE HYDROCHLORIDE 1 DROP: 20 SOLUTION/ DROPS OPHTHALMIC at 14:13

## 2019-01-01 RX ADMIN — DOCUSATE SODIUM 200 MG: 100 CAPSULE, LIQUID FILLED ORAL at 21:28

## 2019-01-01 RX ADMIN — TAMSULOSIN HYDROCHLORIDE 0.4 MG: 0.4 CAPSULE ORAL at 20:11

## 2019-01-01 RX ADMIN — DOCUSATE SODIUM 200 MG: 100 CAPSULE, LIQUID FILLED ORAL at 20:16

## 2019-01-01 RX ADMIN — IPRATROPIUM BROMIDE AND ALBUTEROL SULFATE 1 AMPULE: .5; 3 SOLUTION RESPIRATORY (INHALATION) at 17:58

## 2019-01-01 RX ADMIN — BRIMONIDINE TARTRATE 1 DROP: 2 SOLUTION OPHTHALMIC at 09:19

## 2019-01-01 RX ADMIN — DORZOLAMIDE HYDROCHLORIDE 1 DROP: 20 SOLUTION/ DROPS OPHTHALMIC at 00:35

## 2019-01-01 RX ADMIN — TAMSULOSIN HYDROCHLORIDE 0.4 MG: 0.4 CAPSULE ORAL at 21:03

## 2019-01-01 RX ADMIN — METOPROLOL TARTRATE 5 MG: 1 INJECTION, SOLUTION INTRAVENOUS at 11:20

## 2019-01-01 RX ADMIN — LINEZOLID 600 MG: 600 INJECTION, SOLUTION INTRAVENOUS at 15:03

## 2019-01-01 RX ADMIN — LATANOPROST 1 DROP: 50 SOLUTION OPHTHALMIC at 21:04

## 2019-01-01 RX ADMIN — TIMOLOL MALEATE 1 DROP: 5 SOLUTION OPHTHALMIC at 21:04

## 2019-01-01 RX ADMIN — TAMSULOSIN HYDROCHLORIDE 0.4 MG: 0.4 CAPSULE ORAL at 21:28

## 2019-01-01 RX ADMIN — ATORVASTATIN CALCIUM 80 MG: 40 TABLET, FILM COATED ORAL at 15:33

## 2019-01-01 RX ADMIN — BRIMONIDINE TARTRATE 1 DROP: 2 SOLUTION/ DROPS OPHTHALMIC at 11:22

## 2019-01-01 RX ADMIN — GUAIFENESIN 400 MG: 400 TABLET ORAL at 21:03

## 2019-01-01 RX ADMIN — LINEZOLID 600 MG: 600 INJECTION, SOLUTION INTRAVENOUS at 15:22

## 2019-01-01 RX ADMIN — GUAIFENESIN 400 MG: 400 TABLET ORAL at 14:54

## 2019-01-01 RX ADMIN — ATORVASTATIN CALCIUM 80 MG: 40 TABLET, FILM COATED ORAL at 20:22

## 2019-01-01 RX ADMIN — CHLORHEXIDINE GLUCONATE 0.12% ORAL RINSE 15 ML: 1.2 LIQUID ORAL at 22:11

## 2019-01-01 RX ADMIN — SODIUM CHLORIDE: 9 INJECTION, SOLUTION INTRAVENOUS at 12:57

## 2019-01-01 RX ADMIN — TIMOLOL MALEATE 1 DROP: 5 SOLUTION OPHTHALMIC at 08:41

## 2019-01-01 RX ADMIN — DORZOLAMIDE HYDROCHLORIDE 1 DROP: 20 SOLUTION/ DROPS OPHTHALMIC at 08:55

## 2019-01-01 RX ADMIN — TIMOLOL MALEATE 1 DROP: 5 SOLUTION OPHTHALMIC at 09:19

## 2019-01-01 RX ADMIN — IPRATROPIUM BROMIDE AND ALBUTEROL SULFATE 1 AMPULE: .5; 3 SOLUTION RESPIRATORY (INHALATION) at 13:51

## 2019-01-01 RX ADMIN — BRIMONIDINE TARTRATE 1 DROP: 2 SOLUTION OPHTHALMIC at 08:55

## 2019-01-01 RX ADMIN — AMPICILLIN AND SULBACTAM 3 G: 100; 50 INJECTION, POWDER, FOR SOLUTION INTRAVENOUS at 06:06

## 2019-01-01 RX ADMIN — DORZOLAMIDE HYDROCHLORIDE 1 DROP: 20 SOLUTION/ DROPS OPHTHALMIC at 20:11

## 2019-01-01 RX ADMIN — INSULIN LISPRO 2 UNITS: 100 INJECTION, SOLUTION INTRAVENOUS; SUBCUTANEOUS at 17:30

## 2019-01-01 RX ADMIN — CHLORHEXIDINE GLUCONATE 0.12% ORAL RINSE 15 ML: 1.2 LIQUID ORAL at 10:37

## 2019-01-01 RX ADMIN — CLOPIDOGREL 75 MG: 75 TABLET, FILM COATED ORAL at 09:20

## 2019-01-01 RX ADMIN — AMPICILLIN SODIUM AND SULBACTAM SODIUM 3 G: 2; 1 INJECTION, POWDER, FOR SOLUTION INTRAMUSCULAR; INTRAVENOUS at 05:23

## 2019-01-01 RX ADMIN — SODIUM CHLORIDE: 9 INJECTION, SOLUTION INTRAVENOUS at 11:22

## 2019-01-01 RX ADMIN — AMPICILLIN AND SULBACTAM 3 G: 100; 50 INJECTION, POWDER, FOR SOLUTION INTRAVENOUS at 12:18

## 2019-01-01 RX ADMIN — Medication 10 ML: at 05:42

## 2019-01-01 RX ADMIN — CEFTRIAXONE 1 G: 1 INJECTION, POWDER, FOR SOLUTION INTRAMUSCULAR; INTRAVENOUS at 13:25

## 2019-01-01 RX ADMIN — DORZOLAMIDE HYDROCHLORIDE 1 DROP: 20 SOLUTION/ DROPS OPHTHALMIC at 08:04

## 2019-01-01 RX ADMIN — DOCUSATE SODIUM 200 MG: 100 CAPSULE, LIQUID FILLED ORAL at 20:11

## 2019-01-01 RX ADMIN — DORZOLAMIDE HYDROCHLORIDE 1 DROP: 20 SOLUTION/ DROPS OPHTHALMIC at 10:38

## 2019-01-01 RX ADMIN — BRIMONIDINE TARTRATE 1 DROP: 2 SOLUTION OPHTHALMIC at 21:50

## 2019-01-01 RX ADMIN — METOPROLOL TARTRATE 50 MG: 50 TABLET ORAL at 08:04

## 2019-01-01 RX ADMIN — DOCUSATE SODIUM 200 MG: 100 CAPSULE, LIQUID FILLED ORAL at 20:23

## 2019-01-01 RX ADMIN — METOPROLOL TARTRATE 50 MG: 50 TABLET, FILM COATED ORAL at 08:41

## 2019-01-01 RX ADMIN — DORNASE ALFA 2.5 MG: 1 SOLUTION RESPIRATORY (INHALATION) at 09:58

## 2019-01-01 RX ADMIN — SODIUM CHLORIDE 2274 ML: 9 INJECTION, SOLUTION INTRAVENOUS at 18:41

## 2019-01-01 RX ADMIN — DORZOLAMIDE HYDROCHLORIDE 1 DROP: 20 SOLUTION/ DROPS OPHTHALMIC at 17:31

## 2019-01-01 RX ADMIN — LATANOPROST 1 DROP: 50 SOLUTION OPHTHALMIC at 17:31

## 2019-01-01 RX ADMIN — SODIUM CHLORIDE: 9 INJECTION, SOLUTION INTRAVENOUS at 21:53

## 2019-01-01 RX ADMIN — TIMOLOL MALEATE 1 DROP: 5 SOLUTION OPHTHALMIC at 08:57

## 2019-01-01 RX ADMIN — ASPIRIN 81 MG: 81 TABLET ORAL at 09:20

## 2019-01-01 RX ADMIN — SODIUM CHLORIDE: 9 INJECTION, SOLUTION INTRAVENOUS at 21:21

## 2019-01-01 RX ADMIN — CHLORHEXIDINE GLUCONATE 0.12% ORAL RINSE 15 ML: 1.2 LIQUID ORAL at 11:24

## 2019-01-01 RX ADMIN — DORZOLAMIDE HYDROCHLORIDE 1 DROP: 20 SOLUTION/ DROPS OPHTHALMIC at 09:54

## 2019-01-01 RX ADMIN — ALBUTEROL SULFATE 2.5 MG: 2.5 SOLUTION RESPIRATORY (INHALATION) at 20:05

## 2019-01-01 RX ADMIN — AMPICILLIN AND SULBACTAM 3 G: 100; 50 INJECTION, POWDER, FOR SOLUTION INTRAVENOUS at 06:37

## 2019-01-01 RX ADMIN — AMPICILLIN SODIUM AND SULBACTAM SODIUM 3 G: 2; 1 INJECTION, POWDER, FOR SOLUTION INTRAMUSCULAR; INTRAVENOUS at 11:17

## 2019-01-01 RX ADMIN — ATORVASTATIN CALCIUM 80 MG: 40 TABLET, FILM COATED ORAL at 08:04

## 2019-01-01 RX ADMIN — POTASSIUM CHLORIDE 10 MEQ: 7.46 INJECTION, SOLUTION INTRAVENOUS at 16:44

## 2019-01-01 RX ADMIN — IOPAMIDOL 100 ML: 755 INJECTION, SOLUTION INTRAVENOUS at 10:19

## 2019-01-01 RX ADMIN — ALBUTEROL SULFATE 2.5 MG: 2.5 SOLUTION RESPIRATORY (INHALATION) at 16:17

## 2019-01-01 RX ADMIN — SODIUM CHLORIDE: 9 INJECTION, SOLUTION INTRAVENOUS at 13:06

## 2019-01-01 RX ADMIN — DOCUSATE SODIUM 200 MG: 100 CAPSULE, LIQUID FILLED ORAL at 21:03

## 2019-01-01 RX ADMIN — DOCUSATE SODIUM 200 MG: 100 CAPSULE, LIQUID FILLED ORAL at 21:14

## 2019-01-01 RX ADMIN — ENOXAPARIN SODIUM 40 MG: 40 INJECTION SUBCUTANEOUS at 15:36

## 2019-01-01 RX ADMIN — CEFEPIME HYDROCHLORIDE 1 G: 1 INJECTION, POWDER, FOR SOLUTION INTRAMUSCULAR; INTRAVENOUS at 15:03

## 2019-01-01 RX ADMIN — IPRATROPIUM BROMIDE AND ALBUTEROL SULFATE 1 AMPULE: .5; 3 SOLUTION RESPIRATORY (INHALATION) at 06:00

## 2019-01-01 RX ADMIN — AMPICILLIN AND SULBACTAM 3 G: 100; 50 INJECTION, POWDER, FOR SOLUTION INTRAVENOUS at 18:46

## 2019-01-01 RX ADMIN — TIMOLOL MALEATE 1 DROP: 5 SOLUTION OPHTHALMIC at 10:38

## 2019-01-01 RX ADMIN — LINEZOLID 600 MG: 600 INJECTION, SOLUTION INTRAVENOUS at 02:36

## 2019-01-01 RX ADMIN — CEFEPIME HYDROCHLORIDE 1 G: 1 INJECTION, POWDER, FOR SOLUTION INTRAMUSCULAR; INTRAVENOUS at 21:03

## 2019-01-01 RX ADMIN — BRIMONIDINE TARTRATE 1 DROP: 2 SOLUTION OPHTHALMIC at 08:40

## 2019-01-01 RX ADMIN — DORZOLAMIDE HYDROCHLORIDE 1 DROP: 20 SOLUTION/ DROPS OPHTHALMIC at 20:23

## 2019-01-01 RX ADMIN — Medication 10 ML: at 22:12

## 2019-01-01 RX ADMIN — PIPERACILLIN SODIUM AND TAZOBACTAM SODIUM 4.5 G: 4; .5 INJECTION, POWDER, LYOPHILIZED, FOR SOLUTION INTRAVENOUS at 18:41

## 2019-01-01 RX ADMIN — ENOXAPARIN SODIUM 40 MG: 40 INJECTION SUBCUTANEOUS at 11:16

## 2019-01-01 RX ADMIN — TIMOLOL MALEATE 1 DROP: 5 SOLUTION OPHTHALMIC at 20:11

## 2019-01-01 RX ADMIN — BRIMONIDINE TARTRATE 1 DROP: 2 SOLUTION OPHTHALMIC at 21:15

## 2019-01-01 RX ADMIN — BRIMONIDINE TARTRATE 1 DROP: 2 SOLUTION OPHTHALMIC at 08:04

## 2019-01-01 RX ADMIN — SODIUM CHLORIDE: 9 INJECTION, SOLUTION INTRAVENOUS at 09:54

## 2019-01-01 RX ADMIN — DOXYCYCLINE 100 MG: 100 INJECTION, POWDER, LYOPHILIZED, FOR SOLUTION INTRAVENOUS at 13:47

## 2019-01-01 RX ADMIN — IPRATROPIUM BROMIDE AND ALBUTEROL SULFATE 1 AMPULE: .5; 3 SOLUTION RESPIRATORY (INHALATION) at 06:01

## 2019-01-01 RX ADMIN — DORZOLAMIDE HYDROCHLORIDE 1 DROP: 20 SOLUTION/ DROPS OPHTHALMIC at 08:41

## 2019-01-01 RX ADMIN — ASPIRIN 81 MG: 81 TABLET ORAL at 08:41

## 2019-01-01 RX ADMIN — Medication 10 ML: at 05:41

## 2019-01-01 RX ADMIN — BRIMONIDINE TARTRATE 1 DROP: 2 SOLUTION OPHTHALMIC at 08:57

## 2019-01-01 RX ADMIN — ATORVASTATIN CALCIUM 80 MG: 40 TABLET, FILM COATED ORAL at 20:16

## 2019-01-01 RX ADMIN — LATANOPROST 1 DROP: 50 SOLUTION OPHTHALMIC at 08:55

## 2019-01-01 RX ADMIN — TAMSULOSIN HYDROCHLORIDE 0.4 MG: 0.4 CAPSULE ORAL at 20:18

## 2019-01-01 RX ADMIN — Medication 10 ML: at 10:19

## 2019-01-01 RX ADMIN — SODIUM CHLORIDE: 9 INJECTION, SOLUTION INTRAVENOUS at 00:19

## 2019-01-01 RX ADMIN — AMPICILLIN AND SULBACTAM 3 G: 100; 50 INJECTION, POWDER, FOR SOLUTION INTRAVENOUS at 11:40

## 2019-01-01 RX ADMIN — FINASTERIDE 5 MG: 5 TABLET, FILM COATED ORAL at 17:31

## 2019-01-01 RX ADMIN — SODIUM CHLORIDE: 9 INJECTION, SOLUTION INTRAVENOUS at 06:15

## 2019-01-01 RX ADMIN — TIMOLOL MALEATE 1 DROP: 5 SOLUTION OPHTHALMIC at 20:23

## 2019-01-01 RX ADMIN — DORZOLAMIDE HYDROCHLORIDE 1 DROP: 20 SOLUTION/ DROPS OPHTHALMIC at 21:14

## 2019-01-01 RX ADMIN — ENOXAPARIN SODIUM 40 MG: 40 INJECTION SUBCUTANEOUS at 08:40

## 2019-01-01 RX ADMIN — CEFEPIME HYDROCHLORIDE 1 G: 1 INJECTION, POWDER, FOR SOLUTION INTRAMUSCULAR; INTRAVENOUS at 05:19

## 2019-01-01 RX ADMIN — AMPICILLIN SODIUM AND SULBACTAM SODIUM 3 G: 2; 1 INJECTION, POWDER, FOR SOLUTION INTRAMUSCULAR; INTRAVENOUS at 11:22

## 2019-01-01 RX ADMIN — DORZOLAMIDE HYDROCHLORIDE 1 DROP: 20 SOLUTION/ DROPS OPHTHALMIC at 13:05

## 2019-01-01 RX ADMIN — CEFEPIME HYDROCHLORIDE 1 G: 1 INJECTION, POWDER, FOR SOLUTION INTRAMUSCULAR; INTRAVENOUS at 22:30

## 2019-01-01 RX ADMIN — CEFEPIME HYDROCHLORIDE 1 G: 1 INJECTION, POWDER, FOR SOLUTION INTRAMUSCULAR; INTRAVENOUS at 17:38

## 2019-01-01 RX ADMIN — Medication 10 ML: at 11:24

## 2019-01-01 RX ADMIN — AMPICILLIN SODIUM AND SULBACTAM SODIUM 3 G: 2; 1 INJECTION, POWDER, FOR SOLUTION INTRAMUSCULAR; INTRAVENOUS at 11:13

## 2019-01-01 RX ADMIN — DORZOLAMIDE HYDROCHLORIDE 1 DROP: 20 SOLUTION/ DROPS OPHTHALMIC at 08:40

## 2019-01-01 RX ADMIN — IPRATROPIUM BROMIDE AND ALBUTEROL SULFATE 1 AMPULE: .5; 3 SOLUTION RESPIRATORY (INHALATION) at 20:50

## 2019-01-01 RX ADMIN — FINASTERIDE 5 MG: 5 TABLET, FILM COATED ORAL at 09:20

## 2019-01-01 RX ADMIN — METOPROLOL TARTRATE 5 MG: 1 INJECTION, SOLUTION INTRAVENOUS at 17:19

## 2019-01-01 RX ADMIN — METOPROLOL TARTRATE 50 MG: 50 TABLET, FILM COATED ORAL at 21:03

## 2019-01-01 RX ADMIN — ENOXAPARIN SODIUM 40 MG: 40 INJECTION SUBCUTANEOUS at 09:54

## 2019-01-01 RX ADMIN — DORZOLAMIDE HYDROCHLORIDE 1 DROP: 20 SOLUTION/ DROPS OPHTHALMIC at 21:50

## 2019-01-01 RX ADMIN — METOPROLOL TARTRATE 50 MG: 50 TABLET ORAL at 20:11

## 2019-01-01 RX ADMIN — CLOPIDOGREL 75 MG: 75 TABLET, FILM COATED ORAL at 14:12

## 2019-01-01 RX ADMIN — CHLORHEXIDINE GLUCONATE 0.12% ORAL RINSE 15 ML: 1.2 LIQUID ORAL at 21:03

## 2019-01-01 RX ADMIN — DORZOLAMIDE HYDROCHLORIDE 1 DROP: 20 SOLUTION/ DROPS OPHTHALMIC at 14:12

## 2019-01-01 RX ADMIN — AMPICILLIN AND SULBACTAM 3 G: 100; 50 INJECTION, POWDER, FOR SOLUTION INTRAVENOUS at 23:12

## 2019-01-01 RX ADMIN — AMPICILLIN AND SULBACTAM 3 G: 100; 50 INJECTION, POWDER, FOR SOLUTION INTRAVENOUS at 15:23

## 2019-01-01 RX ADMIN — TAMSULOSIN HYDROCHLORIDE 0.4 MG: 0.4 CAPSULE ORAL at 20:23

## 2019-01-01 RX ADMIN — FINASTERIDE 5 MG: 5 TABLET, FILM COATED ORAL at 08:54

## 2019-01-01 RX ADMIN — METHYLPREDNISOLONE SODIUM SUCCINATE 40 MG: 125 INJECTION, POWDER, FOR SOLUTION INTRAMUSCULAR; INTRAVENOUS at 22:10

## 2019-01-01 RX ADMIN — IPRATROPIUM BROMIDE AND ALBUTEROL SULFATE 1 AMPULE: .5; 3 SOLUTION RESPIRATORY (INHALATION) at 08:37

## 2019-01-01 RX ADMIN — AMPICILLIN SODIUM AND SULBACTAM SODIUM 3 G: 2; 1 INJECTION, POWDER, FOR SOLUTION INTRAMUSCULAR; INTRAVENOUS at 00:39

## 2019-01-01 RX ADMIN — FINASTERIDE 5 MG: 5 TABLET, FILM COATED ORAL at 08:04

## 2019-01-01 RX ADMIN — LATANOPROST 1 DROP: 50 SOLUTION OPHTHALMIC at 08:57

## 2019-01-01 RX ADMIN — AMPICILLIN AND SULBACTAM 3 G: 100; 50 INJECTION, POWDER, FOR SOLUTION INTRAVENOUS at 23:27

## 2019-01-01 RX ADMIN — POTASSIUM CHLORIDE 10 MEQ: 7.46 INJECTION, SOLUTION INTRAVENOUS at 15:23

## 2019-01-01 RX ADMIN — SODIUM CHLORIDE: 9 INJECTION, SOLUTION INTRAVENOUS at 12:23

## 2019-01-01 RX ADMIN — TIMOLOL MALEATE 1 DROP: 5 SOLUTION OPHTHALMIC at 09:54

## 2019-01-01 RX ADMIN — POTASSIUM CHLORIDE 10 MEQ: 7.46 INJECTION, SOLUTION INTRAVENOUS at 17:58

## 2019-01-01 RX ADMIN — NITROGLYCERIN 0.4 MG: 0.4 TABLET, ORALLY DISINTEGRATING SUBLINGUAL at 16:30

## 2019-01-01 RX ADMIN — IPRATROPIUM BROMIDE AND ALBUTEROL SULFATE 1 AMPULE: .5; 3 SOLUTION RESPIRATORY (INHALATION) at 17:55

## 2019-01-01 RX ADMIN — LATANOPROST 1 DROP: 50 SOLUTION OPHTHALMIC at 00:35

## 2019-01-01 RX ADMIN — Medication 10 ML: at 22:11

## 2019-01-01 RX ADMIN — TIMOLOL MALEATE 1 DROP: 5 SOLUTION OPHTHALMIC at 11:23

## 2019-01-01 RX ADMIN — IPRATROPIUM BROMIDE AND ALBUTEROL SULFATE 1 AMPULE: .5; 3 SOLUTION RESPIRATORY (INHALATION) at 17:54

## 2019-01-01 RX ADMIN — TIMOLOL MALEATE 1 DROP: 5 SOLUTION OPHTHALMIC at 21:50

## 2019-01-01 RX ADMIN — DORZOLAMIDE HYDROCHLORIDE 1 DROP: 20 SOLUTION/ DROPS OPHTHALMIC at 09:19

## 2019-01-01 RX ADMIN — SODIUM CHLORIDE: 9 INJECTION, SOLUTION INTRAVENOUS at 05:23

## 2019-01-01 RX ADMIN — TIMOLOL MALEATE 1 DROP: 5 SOLUTION OPHTHALMIC at 08:04

## 2019-01-01 RX ADMIN — LATANOPROST 1 DROP: 50 SOLUTION OPHTHALMIC at 12:21

## 2019-01-01 ASSESSMENT — PAIN SCALES - WONG BAKER: WONGBAKER_NUMERICALRESPONSE: 2

## 2019-01-01 ASSESSMENT — PAIN SCALES - GENERAL
PAINLEVEL_OUTOF10: 0
PAINLEVEL_OUTOF10: 5
PAINLEVEL_OUTOF10: 0

## 2019-01-01 ASSESSMENT — PATIENT HEALTH QUESTIONNAIRE - PHQ9
2. FEELING DOWN, DEPRESSED OR HOPELESS: 0
SUM OF ALL RESPONSES TO PHQ9 QUESTIONS 1 & 2: 0
1. LITTLE INTEREST OR PLEASURE IN DOING THINGS: 0
2. FEELING DOWN, DEPRESSED OR HOPELESS: 0
SUM OF ALL RESPONSES TO PHQ QUESTIONS 1-9: 0
1. LITTLE INTEREST OR PLEASURE IN DOING THINGS: 0
SUM OF ALL RESPONSES TO PHQ QUESTIONS 1-9: 0
SUM OF ALL RESPONSES TO PHQ9 QUESTIONS 1 & 2: 0
SUM OF ALL RESPONSES TO PHQ QUESTIONS 1-9: 0
SUM OF ALL RESPONSES TO PHQ QUESTIONS 1-9: 0

## 2019-01-01 ASSESSMENT — ENCOUNTER SYMPTOMS
CHEST TIGHTNESS: 0
CHEST TIGHTNESS: 0
NAUSEA: 0
BACK PAIN: 0
PHOTOPHOBIA: 0
DIARRHEA: 0
FACIAL SWELLING: 0
COLOR CHANGE: 0
FACIAL SWELLING: 0
EYE DISCHARGE: 0
SORE THROAT: 0
EYE PAIN: 0
VOMITING: 0
ABDOMINAL PAIN: 0
ALLERGIC/IMMUNOLOGIC NEGATIVE: 1
BLOOD IN STOOL: 0
APNEA: 0
SINUS PRESSURE: 0
SINUS PRESSURE: 0
NAUSEA: 0
VOMITING: 0
COUGH: 0
SHORTNESS OF BREATH: 0
ABDOMINAL PAIN: 0
COUGH: 1
SINUS PRESSURE: 0
NAUSEA: 0
APNEA: 0
SHORTNESS OF BREATH: 1
WHEEZING: 0
SHORTNESS OF BREATH: 0
DIARRHEA: 0
ALLERGIC/IMMUNOLOGIC NEGATIVE: 1
SORE THROAT: 0
SORE THROAT: 0
WHEEZING: 0
HEMATOCHEZIA: 0
DIARRHEA: 0
BACK PAIN: 0
COLOR CHANGE: 0
VOMITING: 0
BLOOD IN STOOL: 0
WHEEZING: 0
PHOTOPHOBIA: 0
EYE REDNESS: 0
BACK PAIN: 0
ABDOMINAL PAIN: 0
COUGH: 1

## 2019-01-01 ASSESSMENT — PAIN DESCRIPTION - ONSET
ONSET: ON-GOING
ONSET: ON-GOING

## 2019-01-01 ASSESSMENT — PAIN - FUNCTIONAL ASSESSMENT: PAIN_FUNCTIONAL_ASSESSMENT: PREVENTS OR INTERFERES SOME ACTIVE ACTIVITIES AND ADLS

## 2019-01-01 ASSESSMENT — PAIN DESCRIPTION - PROGRESSION: CLINICAL_PROGRESSION: NOT CHANGED

## 2019-01-01 ASSESSMENT — PAIN DESCRIPTION - FREQUENCY
FREQUENCY: INTERMITTENT
FREQUENCY: CONTINUOUS

## 2019-01-01 ASSESSMENT — PAIN DESCRIPTION - LOCATION
LOCATION: GENERALIZED
LOCATION: GENERALIZED

## 2019-01-01 ASSESSMENT — PAIN DESCRIPTION - DESCRIPTORS: DESCRIPTORS: DULL

## 2019-01-01 ASSESSMENT — PAIN DESCRIPTION - PAIN TYPE: TYPE: ACUTE PAIN

## 2019-03-11 NOTE — PROGRESS NOTES
Vascular Surgery Outpatient Followup    PCP : Amy Quevedo DO   Cardiologist : Dr. Linda Lissa:    This is a 80 y.o. male who presents in fu regarding hx of carotid stenosis, AAA. He was seen 6/2018 for acute onset of confusion, difficulty with speaking. He was noted on MRI to have R Frontal lobe stroke. Since that time he denies any new acute issues. He denies focal weakness, slurred speech, amaurosis fugax. He denies sxs of a stroke or TIA since previous issues. He does feel that he is more forgetful and not quite the same. He had recent eye procedures done by Dr. Demarco Bermudez in 11/2018. He doesn't feel he has had significant improvement since that time. He is supposed to see him in 2/2019. He denies acute abdominal or back pain.       Past Medical History:        Diagnosis Date    AAA (abdominal aortic aneurysm) (HCC)     [facility]     Diabetes ([facility] [facility]     Glaucoma     High blood sugar     Hypertension      Past Surgical History:        Procedure Laterality Date    APPENDECTOMY      CARDIAC SURGERY      CORONARY ARTERY BYPASS GRAFT      LUMBAR SPINE SURGERY       Current Medications:   Current Outpatient Medications   Medication Sig Dispense Refill    metoprolol tartrate (LOPRESSOR) 50 MG tablet TAKE 1 TABLET BY MOUTH TWICE DAILY 60 tablet 0    metFORMIN (GLUCOPHAGE) 500 MG tablet TAKE 1 TABLET BY MOUTH TWICE DAILY WITH MEALS 180 tablet 3    carboxymethylcellulose (THERATEARS) 1 % ophthalmic gel 1 drop      clopidogrel (PLAVIX) 75 MG tablet Take 1 tablet by mouth daily 90 tablet 3    ramipril (ALTACE) 2.5 MG capsule Take 1 capsule by mouth daily 30 capsule 11    atorvastatin (LIPITOR) 80 MG tablet Take 1 tablet by mouth daily 90 tablet 3    Omega-3 Fatty Acids (FISH OIL) 1200 MG CAPS Take 1,200 mg by mouth daily      docusate sodium (COLACE) 100 MG capsule Take 200 mg by mouth nightly      finasteride (PROSCAR) 5 MG tablet TK 1 T PO D  12    nitroGLYCERIN (NITROSTAT) 0.4 MG SL tablet Place 0.4 mg under the tongue      Specialty Vitamins Products (ICAPS LUTEIN-ZEAXANTHIN) TBCR Take by mouth      fexofenadine (ALLEGRA) 180 MG tablet Take 180 mg by mouth daily.  aspirin EC 81 MG EC tablet Take 162 mg by mouth daily       tamsulosin (FLOMAX) 0.4 MG capsule Take 0.4 mg by mouth nightly       brinzolamide (AZOPT) 1 % ophthalmic suspension 1 drop 3 times daily.  bimatoprost (LUMIGAN) 0.01 % SOLN ophthalmic drops Place 1 drop into both eyes nightly.  brimonidine-timolol (COMBIGAN) 0.2-0.5 % ophthalmic solution Place 1 drop into both eyes every 12 hours. No current facility-administered medications for this visit. Allergies:  Patient has no known allergies.   Social History     Socioeconomic History    Marital status:      Spouse name: Not on file    Number of children: Not on file    Years of education: Not on file    Highest education level: Not on file   Occupational History    Not on file   Social Needs    Financial resource strain: Not on file    Food insecurity:     Worry: Not on file     Inability: Not on file    Transportation needs:     Medical: Not on file     Non-medical: Not on file   Tobacco Use    Smoking status: Former Smoker     Packs/day: 1.00     Last attempt to quit: 1989     Years since quittin.6    Smokeless tobacco: Never Used   Substance and Sexual Activity    Alcohol use: Yes     Comment: occassional glass of  wine    Drug use: No    Sexual activity: Not on file   Lifestyle    Physical activity:     Days per week: Not on file     Minutes per session: Not on file    Stress: Not on file   Relationships    Social connections:     Talks on phone: Not on file     Gets together: Not on file     Attends Alevism service: Not on file     Active member of club or organization: Not on file     Attends meetings of clubs or organizations: Not on file     Relationship status: Not on file    Intimate partner violence:     Fear of current or ex partner: Not on file     Emotionally abused: Not on file     Physically abused: Not on file     Forced sexual activity: Not on file   Other Topics Concern    Not on file   Social History Narrative    Not on file     Family History   Problem Relation Age of Onset    Kidney Disease Mother     Cancer Sister     Cirrhosis Brother      Labs  Lab Results   Component Value Date    WBC 4.2 (L) 07/11/2018    HGB 11.1 (L) 07/11/2018    HCT 34.2 (L) 07/11/2018     (L) 07/11/2018    K 5.9 (H) 07/11/2018    BUN 31 (H) 07/11/2018    CREATININE 1.3 (H) 07/11/2018       PHYSICAL EXAM:    CONSTITUTIONAL:   Awake, alert, cooperative  PSYCHIATRIC :  Oriented to time, place and person     Appropriate insight to disease process  EYES: Lids and lashes normal  ENT:  External ears and nose without lesions   Hearing deficits noted  NECK: Supple, symmetrical, trachea midline   Carotid bruit not noted  CN II - XII intact except for hearing and wears glasess  LUNGS:  No increased work of breathing                 Clear to auscultation  CARDIOVASCULAR:  regular rate and rhythm   ABDOMEN:  soft, non-distended, non-tender   Aorta is not palpable  SKIN:   Normal skin color   Texture and turgor normal, no induration  EXTREMITIES:   R UE  5/5 strength  L UE 5/5 strength  R LE Edema absent  L LE Edema absent    RADIOLOGY:  Carotid Duplex   R ICA  50-59% stenosis   L ICA  60-69% stenosis   R Vertebral antegrade flow   L Vertebral antegrade flow    A/P Asymptomatic Bilateral Carotid Stenosis  · Carotid disease appears stable as compared to recent CTA  · Medical management with . ASA, plavix and statin  · Discussed with pt tobacco use and relationship to atherosclerosis  pt currently is not a smoker  · No indication for surgical intervention at this time  · Discussed with patient pathophysiology of carotid stenosis and all ?s answered  · Discussed with patient symptoms of stroke, TIA and they understood to go to ER immediately if any symptoms developed  · F/U as outpatient in 12 Months - will arrange with carotid duplex    Assx 5.5 cm Abdominal Aortic Aneurysm  · Continue medical management with ASA,  B Blocker, and statin  · Emphasized importance of continued Tobacco cessation  · Discussed with patient pathophysiology of AAA, risk of rupture and all ?s answered  · Discussed with patient symptoms of abdominal pain, back pain and need to go to ER immediately if they if any symptoms developed  · If he was rupture he would not want to undergo repair  · We discussed the imaging, he is not a good candidate for fenestrated repair after review of imaging  · He would need open repair and considering age not a good candidate  · No plans for repair at this time  · All ? answered    Randall Whatley

## 2019-04-01 NOTE — PROGRESS NOTES
Chief Complaint:   Chief Complaint   Patient presents with    Pneumonia     1 week f/u       Pneumonia   He complains of cough. There is no shortness of breath or wheezing. This is a new problem. The current episode started in the past 7 days. The problem occurs daily. The problem has been gradually worsening. Pertinent negatives include no chest pain, headaches, myalgias or sore throat. His past medical history is significant for pneumonia.        Patient Active Problem List   Diagnosis    Diabetes mellitus without complication ([address]    Hyperlipidemia    Essential hypertension, benign    Abdominal aortic aneurysm (AAA) without rupture ([address]    CAD (coronary artery disease)    Asymptomatic bilateral carotid artery stenosis    Acute CVA (cerebrovascular accident) ([address]    Acute cerebrovascular accident (CVA) ([address]    CVA (cerebral vascular accident) ([address]       Past Medical History:   Diagnosis Date    [facility]     CVA (cerebral vascular accident) ([facility]     Diabetes ([facility]     Glaucoma     High blood sugar     Hypertension        Past Surgical History:   Procedure Laterality Date    APPENDECTOMY      CARDIAC SURGERY      CORONARY ARTERY BYPASS GRAFT      LUMBAR SPINE SURGERY         Current Outpatient Medications   Medication Sig Dispense Refill    albuterol sulfate HFA (VENTOLIN HFA) 108 (90 Base) MCG/ACT inhaler Inhale 2 puffs into the lungs every 6 hours as needed for Wheezing 1 Inhaler 3    levofloxacin (LEVAQUIN) 500 MG tablet Take 1 tablet by mouth daily for 10 days 10 tablet 0    metoprolol tartrate (LOPRESSOR) 50 MG tablet TAKE 1 TABLET BY MOUTH TWICE DAILY 60 tablet 0    metFORMIN (GLUCOPHAGE) 500 MG tablet TAKE 1 TABLET BY MOUTH TWICE DAILY WITH MEALS 180 tablet 3    carboxymethylcellulose (THERATEARS) 1 % ophthalmic gel 1 drop      ramipril (ALTACE) 2.5 MG capsule Take 1 capsule by mouth daily 30 capsule 11    atorvastatin (LIPITOR) 80 MG tablet Take 1 tablet by mouth daily 90 tablet 3    Omega-3 Fatty Acids (FISH OIL) 1200 MG CAPS Take 1,200 mg by mouth daily      docusate sodium (COLACE) 100 MG capsule Take 200 mg by mouth nightly      finasteride (PROSCAR) 5 MG tablet TK 1 T PO D  12    nitroGLYCERIN (NITROSTAT) 0.4 MG SL tablet Place 0.4 mg under the tongue      Specialty Vitamins Products (ICAPS LUTEIN-ZEAXANTHIN) TBCR Take by mouth      fexofenadine (ALLEGRA) 180 MG tablet Take 180 mg by mouth daily.  aspirin EC 81 MG EC tablet Take 162 mg by mouth daily       tamsulosin (FLOMAX) 0.4 MG capsule Take 0.4 mg by mouth nightly       brinzolamide (AZOPT) 1 % ophthalmic suspension 1 drop 3 times daily.  bimatoprost (LUMIGAN) 0.01 % SOLN ophthalmic drops Place 1 drop into both eyes nightly.  brimonidine-timolol (COMBIGAN) 0.2-0.5 % ophthalmic solution Place 1 drop into both eyes every 12 hours.  clopidogrel (PLAVIX) 75 MG tablet Take 1 tablet by mouth daily 90 tablet 3     No current facility-administered medications for this visit.         No Known Allergies    Social History     Socioeconomic History    Marital status:      Spouse name: None    Number of children: None    Years of education: None    Highest education level: None   Occupational History    None   Social Needs    Financial resource strain: None    Food insecurity:     Worry: None     Inability: None    Transportation needs:     Medical: None     Non-medical: None   Tobacco Use    Smoking status: Former Smoker     Packs/day: 1.00     Types: Cigarettes     Last attempt to quit: 1989     Years since quittin.6    Smokeless tobacco: Never Used   Substance and Sexual Activity    Alcohol use: Yes     Comment: occassional glass of  wine    Drug use: No    Sexual activity: None   Lifestyle    Physical activity:     Days per week: None     Minutes per session: None    Stress: None   Relationships    Social connections:     Talks on phone: None Gets together: None     Attends Pentecostal service: None     Active member of club or organization: None     Attends meetings of clubs or organizations: None     Relationship status: None    Intimate partner violence:     Fear of current or ex partner: None     Emotionally abused: None     Physically abused: None     Forced sexual activity: None   Other Topics Concern    None   Social History Narrative    None       Family History   Problem Relation Age of Onset    Kidney Disease Mother     Cancer Sister     Cirrhosis Brother          Review of Systems   Constitutional: Negative. HENT: Negative for congestion, facial swelling, hearing loss, nosebleeds, sinus pressure and sore throat. Eyes: Negative for photophobia and visual disturbance. Respiratory: Positive for cough. Negative for apnea, chest tightness, shortness of breath and wheezing. Cardiovascular: Negative for chest pain, palpitations and leg swelling. Gastrointestinal: Negative for abdominal pain, blood in stool, diarrhea, nausea and vomiting. Genitourinary: Negative for difficulty urinating, frequency and urgency. Musculoskeletal: Negative for arthralgias, back pain, joint swelling and myalgias. Skin: Negative for color change and rash. Allergic/Immunologic: Negative. Neurological: Negative for syncope, weakness, light-headedness and headaches. Hematological: Negative. Psychiatric/Behavioral: Negative for agitation, behavioral problems, confusion and self-injury. The patient is not nervous/anxious. All other systems reviewed and are negative. Physical Exam   Constitutional: He is oriented to person, place, and time. He appears well-developed and well-nourished. No distress. HENT:   Head: Normocephalic and atraumatic. Nose: Nose normal.   Mouth/Throat: Oropharynx is clear and moist.   Eyes: Pupils are equal, round, and reactive to light. Conjunctivae and EOM are normal.   Neck: Normal range of motion.  Neck supple. No JVD present. No thyromegaly present. Cardiovascular: Normal rate, regular rhythm and normal heart sounds. Exam reveals no gallop and no friction rub. No murmur heard. Pulmonary/Chest: Effort normal. No respiratory distress. He has no wheezes. He has rhonchi. Abdominal: Soft. Bowel sounds are normal. He exhibits no distension. There is no tenderness. There is no rebound and no guarding. Musculoskeletal: Normal range of motion. Lymphadenopathy:     He has no cervical adenopathy. Neurological: He is alert and oriented to person, place, and time. He has normal reflexes. No cranial nerve deficit. He exhibits normal muscle tone. Coordination normal.   Skin: Skin is warm and dry. No rash noted. No erythema. Psychiatric: He has a normal mood and affect. His behavior is normal. Judgment normal.   Nursing note and vitals reviewed. ASSESSMENT/PLAN:    Deyanira Woodruff was seen today for pneumonia. Diagnoses and all orders for this visit:    Pneumonia due to infectious organism, unspecified laterality, unspecified part of lung    Hyperkalemia  -     POTASSIUM;  Future      Finish antibitics      Zeferino Flores DO    4/1/2019  10:51 AM

## 2019-04-15 NOTE — PATIENT INSTRUCTIONS
will not likely change your aortic aneurysm, but it may lower the chance of complications if you ever need surgery. · Do not smoke or allow others to smoke around you. Smoking can make your condition worse. If you need help quitting, talk to your doctor about stop-smoking programs and medicines. These can increase your chances of quitting for good. When should you call for help? Call 911 anytime you think you may need emergency care. For example, call if:    · You have severe pain in your belly, back, or chest.     · You passed out (lost consciousness).     · You have severe trouble breathing.    Call your doctor now or seek immediate medical care if:    · You are dizzy or lightheaded, or you feel like you may faint.     · One or both feet change color, are painful, feel cool, or burn or tingle.    Watch closely for changes in your health, and be sure to contact your doctor if you have any problems. Where can you learn more? Go to [email or web address][employer][email or web address]. org and sign in to your [employer] account. Enter W337 in the [employer] box to learn more about \"Abdominal Aortic Aneurysm: Care Instructions. \"     If you do not have an account, please click on the \"Sign Up Now\" link. Current as of: September 26, 2018  Content Version: 11.9  © 6319-5809 [employer]. Care instructions adapted under license by [facility] ([facility]). If you have questions about a medical condition or this instruction, always ask your healthcare professional. [place] [address] any warranty or liability for your use of this information.

## 2019-04-20 PROBLEM — J18.9 COMMUNITY ACQUIRED PNEUMONIA OF LEFT LOWER LOBE OF LUNG: Status: ACTIVE | Noted: 2019-01-01
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
## 2019-04-20 NOTE — H&P
[place] [address] [facility]   History and Physical      CHIEF COMPLAINT:  Fatigue and Weakness    History of Present Illness: Gui Crespo is a 80 y.o. male with a history of DM, HTN, BPH, CABG and H/o CVA presents with fatigue and weakness. Patient states that in the last 10 days he was been diagnosed with pneumonia. He states that he has had some increased fatigue. Patient also stated that his knees feel weak and he is very tired. Patient recently fell last night d/t weakness. He denies hitting his head, but complains of back and pelvic pain. Patient had had his prostate shaved on 17th d/t urinary frequency and currently has a lagos catheter in that will be removed on Monday 22. Patient associated symptoms from his pneumonia is shortness of breath, fatigue, and weakness. Patient denies any tobacco, illicit drug abuse but drinks alcohol socially. Informant(s) for H&P:Provided by patient     REVIEW OF SYSTEMS:  no fevers, chills, cp, sob, n/v, ha, vision/hearing changes, wt changes, hot/cold flashes, other open skin lesions, diarrhea, constipation, dysuria/hematuria unless noted in HPI. Complete ROS performed with the patient and is otherwise negative. PMH:  Past Medical History:   Diagnosis Date    AAA (abdominal aortic aneurysm) ([address]     CVA (cerebral vascular accident) ([address]     Diabetes ([address]     Glaucoma     High blood sugar     Hypertension        Surgical History:  Past Surgical History:   Procedure Laterality Date    APPENDECTOMY      CARDIAC SURGERY      CORONARY ARTERY BYPASS GRAFT      LUMBAR SPINE SURGERY         Medications Prior to Admission:    Prior to Admission medications    Medication Sig Start Date End Date Taking?  Authorizing Provider   metoprolol tartrate (LOPRESSOR) 50 MG tablet TAKE 1 TABLET BY MOUTH TWICE DAILY 4/1/19  Yes Christ July, DO   albuterol sulfate HFA (VENTOLIN HFA) 108 (90 Base) MCG/ACT inhaler Inhale 2 puffs into the lungs every 6 hours as needed for Wheezing 3/25/19  Yes Erika Fowler,    metFORMIN (GLUCOPHAGE) 500 MG tablet TAKE 1 TABLET BY MOUTH TWICE DAILY WITH MEALS 1/28/19  Yes Erika Fowler, DO   carboxymethylcellulose (THERATEARS) 1 % ophthalmic gel 1 drop   Yes Historical Provider, MD   ramipril (ALTACE) 2.5 MG capsule Take 1 capsule by mouth daily 11/6/18  Yes Juan Gunter MD   atorvastatin (LIPITOR) 80 MG tablet Take 1 tablet by mouth daily 10/15/18  Yes Erika Fowler,    Omega-3 Fatty Acids (FISH OIL) 1200 MG CAPS Take 1,200 mg by mouth daily   Yes Historical Provider, MD   docusate sodium (COLACE) 100 MG capsule Take 200 mg by mouth nightly   Yes Historical Provider, MD   finasteride (PROSCAR) 5 MG tablet TK 1 T PO D 11/16/16  Yes Historical Provider, MD   Specialty Vitamins Products (ICAPS LUTEIN-ZEAXANTHIN) TBCR Take by mouth   Yes Historical Provider, MD   fexofenadine (ALLEGRA) 180 MG tablet Take 180 mg by mouth daily. Yes Historical Provider, MD   aspirin EC 81 MG EC tablet Take 162 mg by mouth daily    Yes Historical Provider, MD   tamsulosin (FLOMAX) 0.4 MG capsule Take 0.4 mg by mouth nightly    Yes Historical Provider, MD   brinzolamide (AZOPT) 1 % ophthalmic suspension 1 drop 3 times daily. Yes Historical Provider, MD   bimatoprost (LUMIGAN) 0.01 % SOLN ophthalmic drops Place 1 drop into both eyes nightly. Yes Historical Provider, MD   brimonidine-timolol (COMBIGAN) 0.2-0.5 % ophthalmic solution Place 1 drop into both eyes every 12 hours. Yes Historical Provider, MD   clopidogrel (PLAVIX) 75 MG tablet Take 1 tablet by mouth daily 11/21/18 3/25/19  Kaylie Hemp, DO   nitroGLYCERIN (NITROSTAT) 0.4 MG SL tablet Place 0.4 mg under the tongue    Historical Provider, MD       Allergies:    Patient has no known allergies. Social History:    reports that he quit smoking about 29 years ago. His smoking use included cigarettes. He smoked 1.00 pack per day.  He has never used smokeless tobacco. He reports that he drinks alcohol. He reports that he does not use drugs. Family History:        Problem Relation Age of Onset    Kidney Disease Mother     Cancer Sister     Cirrhosis Brother        PHYSICAL EXAM:  Vitals:  BP (!) 149/71   Pulse 77   Temp 98.7 °F (37.1 °C)   Resp 18   Ht 5' 11\" (1.803 m)   Wt 168 lb (76.2 kg)   SpO2 95%   BMI 23.43 kg/m²   General Appearance: alert and oriented to person, place and time, well-developed and well-nourished, in no acute distress  Skin: warm and dry  Head: normocephalic and atraumatic  Eyes: pupils equal, round, and reactive to light and poor vision   ENT: hearing abnormal- hard of hearing   Neck: neck supple and non tender without mass   Pulmonary/Chest: rhonchi present and decreased breath sounds noted throughout   Cardiovascular: normal rate, regular rhythm, normal S1 and S2 and intact distal pulses  Abdomen: soft, non-tender, non-distended, normal bowel sounds, no masses or organomegaly  Extremities: no cyanosis, no clubbing and no edema  Musculoskeletal: normal range of motion, no joint swelling, deformity or tenderness  Neurologic: no cranial nerve deficit, speech normal and gait abnormal- unsteady       LABS:  Recent Labs     04/20/19  1134      K 4.3      CO2 24   BUN 11   CREATININE 1.0   GLUCOSE 110*   CALCIUM 8.2*       Recent Labs     04/20/19  1134   WBC 4.7   RBC 2.98*   HGB 9.2*   HCT 29.4*   MCV 98.7   MCH 30.9   MCHC 31.3*   RDW 14.1   *   MPV 10.6       No results for input(s): POCGLU in the last 72 hours.     Magnesium:  No results found for: MG  Troponin:    Lab Results   Component Value Date    TROPONINI 0.01 04/20/2019     U/A:    Lab Results   Component Value Date    COLORU Yellow 04/20/2019    PROTEINU 100 04/20/2019    PHUR 5.0 04/20/2019    WBCUA 1-3 04/20/2019    RBCUA >20 04/20/2019    BACTERIA NONE 04/20/2019    CLARITYU SL CLOUDY 04/20/2019    SPECGRAV 1.025 04/20/2019    LEUKOCYTESUR TRACE 04/20/2019    UROBILINOGEN 0.2 2019    BILIRUBINUR Negative 2019    BLOODU LARGE 2019    GLUCOSEU Negative 2019       Radiology: Ct Abdomen Pelvis Wo Contrast Additional Contrast? None    Result Date: 2019  Patient MRN:  [id number] : 1932 Age: 80 years Gender: Male Order Date:  2019 11:30 AM EXAM: CT ABDOMEN PELVIS WO CONTRAST NUMBER OF IMAGES \ views:  56 INDICATION:  abdominal pain , history of abdominal aortic aneurysm COMPARISON: 3/6/2019 TECHNIQUE: Axial computerized tomography sections of the abdomen and pelvis with sagittal and coronal MPR reconstructions were obtained from the top of the diaphragm to the pelvis. Low-dose CT  acquisition technique included one of following options; 1 . Automated exposure control, 2. Adjustment of MA and or KV according to patient's size or 3. Use of iterative reconstruction. FINDINGS: Evaluation of the solid organs and vasculature are limited without the use of intravenous contrast. Evaluation of the gastrointestinal tract is limited without the use of oral contrast. THORACIC BASE: Patchy consolidation is seen within the left lung base. Calcified pleural plaques are seen within the left lung base. LIVER: Unremarkable. BILIARY: Multiple subcentimeter stones are seen within the dependent neck the gallbladder. PANCREAS: Unremarkable. SPLEEN: Multiple punctate calcifications are seen scattered throughout the spleen. ADRENALS:  Unremarkable. KIDNEYS:  A 0.5 cm stone is seen within the left kidney. A 0.2 cm stone is seen within the left kidney. No evidence for hydronephrosis is identified. PELVIS: A Elias catheter is present within the decompressed bladder. GI: No evidence of free air or bowel obstruction. The appendix is not visualized. A few sigmoid diverticuli are seen without evidence of diverticulitis. MSK: No acute osseous findings.  OTHER: A fusiform abdominal aortic aneurysm is seen just inferior to the takeoff of the renal arteries, measuring 4.4 x 4.8 cm in size, previously 4.6 x 4.9 cm. A second abdominal aortic aneurysm is seen just above the bifurcation, measuring 5.7 x 5.1 cm in size, previously 5.6 x 4.9 cm in size. 1. Left lower lobe pneumonia. 2. 2 abdominal aortic aneurysms, the more inferior of which is minimally increased in size compared to prior exam. 3. Nonobstructing left nephrolithiasis. 4. Cholelithiasis. Xr Chest Portable    Result Date: 2019  Patient MRN: [id number] : 1932 Age:  80 years Gender: Male Order Date: 2019 11:30 AM Exam: XR CHEST PORTABLE Number of Images: 1 view Indication:  Weakness and fatigue with recent hx of pneumonia, suspected sepsis. Comparison: 2019 FINDINGS: Stable postoperative changes noted. Heart enlarged. Pulmonary vascularity congested. Suspect modest developing pulmonary edema at the left base minimal at the right base. The pneumonic infiltrate seen on the right on the prior study persists but is somewhat smaller. Neither costophrenic angle is blunted. Calcified pleural plaque on the left is suggestive of prior is best this exposure. 1. Congestive failure. 2. Persistent pneumonic infiltrate on the right. This may be followed to complete radiographic resolution. If this fails to resolve, contrast enhanced CT should be considered to assess for an underlying neoplasm. EKG: Sinus rhythm with marked sinus arrhythmia, Right bundle branch block, Left anterior fascicular block. Bifascicular block. Abnormal ECG. No previous ECGs available. ASSESSMENT:      Principal Problem:    Community acquired pneumonia of left lower lobe of lung ([address]  Active Problems:    Diabetes mellitus without complication ([address]    Hyperlipidemia    Essential hypertension, benign    CAD (coronary artery disease)    CVA (cerebral vascular accident) ([address]    Fall  Resolved Problems:    * No resolved hospital problems. *      PLAN:    1.  Left Lower Lobe Community Acquired Pneumonia - admit telemetry - supplemental oxygen as needed - IV Rocephin and Zithromax - no c/o cough - may need breathing treatment but not at this time - pulmonary consulted   2. Fall at Home - secondary to generalized weakness - PT/OT consulted   3. Diabetes Mellitus Type II - medium dose Humalog sliding scale added and holding Metformin  4. Essential Hypertension - holding Altace but will add Hydralazine if SBP is greater than 180 - currently stable   5. Coronary Artery Disease - continue BB and Aspirin - no chest pain noted  6. Hyperlipidemia - resumed home dose Lipitor   7. History of CVA - currently on Aspirin - was on Plavix but currently on HOLD until either urine clears or lagos is removed  8. Recent Prostate Surgery - lagos catheter intact and patent draining lucy dark color urine   9. Anemia - H&H       Code Status: Full Code  DVT prophylaxis: Lovenox      Electronically signed by LEANN Singh CNP on 4/20/2019 at 2:24 PM      NOTE: This report was transcribed using voice recognition software. Every effort was made to ensure accuracy; however, inadvertent computerized transcription errors may be present. HOSPITALIST ATTENDING PHYSICIAN NOTE 4/20/2019 1652PM:    Details of the evaluation - subjective assessment (including medication profile, past medical, family and social history when applicable), examination, review of lab and test data, diagnostic impressions and medical decision making - performed by LEANN Singh CNP, were discussed with me on the date of service and I agree with clinical information herein unless otherwise noted. The patient has been evaluated by me personally earlier today. Pt reports no fevers, chills,n/v. Pt c/o generalized weakness        PHYSICAL EXAM:    Vitals:  BP (!) 151/70   Pulse 107   Temp 98.4 °F (36.9 °C) (Oral)   Resp 18   Ht 5' 11\" (1.803 m)   Wt 171 lb 4.8 oz (77.7 kg)   SpO2 94%   BMI 23.89 kg/m²     General:  Appears comfortable.  Answers questions appropriately and cooperative with exam  HEENT:  Mucous membranes moist. No erythema, rhinorrhea, or post-nasal drip noted. Neck:  No carotid bruits. Heart:  Rhythm regular at rate of 104  Lungs:  CTA but diminished. No wheeze, rales, or rhonchi  Abdomen:  Positive bowel sounds positive. Soft. Non-tender. No guarding, rebound or rigidity. Breast/Rectal/Genitourinary: not pertinent. Extremities:  Negative for lower extremity edema  Skin:  Warm and dry  Vascular: 2/4 Dorsalis Pedis pulses bilaterally. Neuro:  Cranial nerves 2-12 grossly intact, no focal weakness or change in sensation noted. Extraocular muscles intact. Pupils equal, round, reactive to light. I agree with the assessment and plan of Rhina Baldwinu, APRN - CNP    Possible pneumonia  Dm type 2 controlled  Generalized weakness  thrombocytopenia  htn  Hyperlipidemia  Recent prostate surgery  Hx of cva      Electronically signed by Surinder Ag D.O.   Hospitalist  [facility]

## 2019-04-20 NOTE — ED NOTES
Patient catheter bag changed to new bag.   Emptied for about 200 ml of dark urine     Paola Vazquez RN  04/20/19 5924

## 2019-04-20 NOTE — ED PROVIDER NOTES
26-year-old male presents emergency Department with low back pain and pelvic pain after he slid out of his bed onto the floor. States she's been increasing weakness over the last couple days throughout body. Date has a history of an indwelling catheter and was trying to avoid tripping over when he was sitting to close the edge of the bed and slid off onto the floor and could not get up. She was recently admitted for pneumonia and states he's never really fully recovered since that. Denies fevers or chills denies cough denies nausea vomiting diarrhea abdominal pain. Patient does note that he has dark colored urine in his Elias bag. The history is provided by the patient. Fatigue   Severity:  Moderate  Onset quality:  Gradual  Timing:  Intermittent  Progression:  Waxing and waning  Chronicity:  New  Relieved by:  Nothing  Worsened by:  Nothing  Ineffective treatments:  None tried  Associated symptoms: falls    Associated symptoms: no abdominal pain, no aphasia, no arthralgias, no chest pain, no cough, no diarrhea, no difficulty walking, no dizziness, no dysphagia, no dysuria, no numbness in extremities, no fever, no foul-smelling urine, no frequency, no headaches, no hematochezia, no melena, no nausea, no shortness of breath, no syncope, no urgency and no vomiting    Risk factors comment:  Hx of recent pneumonia, hx of uretral blockage requiring indwelling catheter      Review of Systems   Constitutional: Positive for fatigue. Negative for chills and fever. HENT: Negative for ear pain, sinus pressure and sore throat. Eyes: Negative for pain, discharge and redness. Respiratory: Negative for cough, shortness of breath and wheezing. Cardiovascular: Negative for chest pain and syncope. Gastrointestinal: Negative for abdominal pain, diarrhea, dysphagia, hematochezia, melena, nausea and vomiting. Genitourinary: Positive for hematuria. Negative for dysuria, frequency and urgency.    Musculoskeletal: Positive for falls. Negative for arthralgias and back pain. Skin: Negative for rash and wound. Neurological: Negative for dizziness, weakness and headaches. Hematological: Negative for adenopathy. All other systems reviewed and are negative. Physical Exam   Constitutional: He is oriented to person, place, and time. He appears well-developed and well-nourished. No distress. HENT:   Head: Normocephalic and atraumatic. Eyes: Pupils are equal, round, and reactive to light. EOM are normal.   Neck: Normal range of motion. Neck supple. Cardiovascular: Normal rate and regular rhythm. Pulmonary/Chest: Effort normal and breath sounds normal.   Abdominal: Soft. Bowel sounds are normal.   Genitourinary:   Genitourinary Comments: Indwelling catheter noted no abnormal penile findings there is dark red coloration to the urine in Elias no sign of clots or sludge   Musculoskeletal: Normal range of motion. He exhibits no edema. Neurological: He is alert and oriented to person, place, and time. Procedures    MDM    ED Course as of Apr 20 1324   Sat Apr 20, 2019   1254 Patient seen and examined. He is alert and oriented answered questions appropriately and denies hitting his head. he is complaining of generalized weakness . Labs and imaging have been ordered for further workup of the patient    [CF]   1314 Patient imaging are reviewed patient does have a persistent left lower lobe pneumonia on chest x-ray and noted on CT he is felt needed need of admission at this point I    [CF]      ED Course User Index  [CF] Gualberto Lozano DO     --------------------------------------------- PAST HISTORY ---------------------------------------------  Past Medical History:  has a past medical history of AAA (abdominal aortic aneurysm) ([facility] [address] CVA [facility]cerebral vascular accident) ([facility] Diabetes ([facility] Glaucoma, High blood sugar, and Hypertension.     Past Surgical History:  has a past surgical history that includes Appendectomy; Coronary artery bypass graft; Cardiac surgery; and Lumbar spine surgery. Social History:  reports that he quit smoking about 29 years ago. His smoking use included cigarettes. He smoked 1.00 pack per day. He has never used smokeless tobacco. He reports that he drinks alcohol. He reports that he does not use drugs. Family History: family history includes Cancer in his sister; Cirrhosis in his brother; Kidney Disease in his mother. The patients home medications have been reviewed. Allergies: Patient has no known allergies.     -------------------------------------------------- RESULTS -------------------------------------------------    LABS:  Results for orders placed or performed during the hospital encounter of 04/20/19   Rapid influenza A/B antigens   Result Value Ref Range    Influenza A by PCR Not Detected Not Detected    Influenza B by PCR Not Detected Not Detected   CBC Auto Differential   Result Value Ref Range    WBC 4.7 4.5 - 11.5 E9/L    RBC 2.98 (L) 3.80 - 5.80 E12/L    Hemoglobin 9.2 (L) 12.5 - 16.5 g/dL    Hematocrit 29.4 (L) 37.0 - 54.0 %    MCV 98.7 80.0 - 99.9 fL    MCH 30.9 26.0 - 35.0 pg    MCHC 31.3 (L) 32.0 - 34.5 %    RDW 14.1 11.5 - 15.0 fL    Platelets 450 (L) 843 - 450 E9/L    MPV 10.6 7.0 - 12.0 fL    Neutrophils % 80.2 (H) 43.0 - 80.0 %    Immature Granulocytes % 0.4 0.0 - 5.0 %    Lymphocytes % 8.5 (L) 20.0 - 42.0 %    Monocytes % 9.0 2.0 - 12.0 %    Eosinophils % 1.7 0.0 - 6.0 %    Basophils % 0.2 0.0 - 2.0 %    Neutrophils # 3.76 1.80 - 7.30 E9/L    Immature Granulocytes # 0.02 E9/L    Lymphocytes # 0.40 (L) 1.50 - 4.00 E9/L    Monocytes # 0.42 0.10 - 0.95 E9/L    Eosinophils # 0.08 0.05 - 0.50 E9/L    Basophils # 0.01 0.00 - 0.20 E9/L    Polychromasia 1+     Poikilocytes 1+     Ovalocytes 1+     Basophilic Stippling 1+    Comprehensive Metabolic Panel   Result Value Ref Range    Sodium 140 132 - 146 mmol/L    Potassium 4.3 3.5 - 5.0 mmol/L    Chloride 104 98 - 107 mmol/L    CO2 24 22 - 29 mmol/L    Anion Gap 12 7 - 16 mmol/L    Glucose 110 (H) 74 - 99 mg/dL    BUN 11 8 - 23 mg/dL    CREATININE 1.0 0.7 - 1.2 mg/dL    GFR Non-African American >60 >=60 mL/min/1.73    GFR African American >60     Calcium 8.2 (L) 8.6 - 10.2 mg/dL    Total Protein 6.3 (L) 6.4 - 8.3 g/dL    Alb 3.3 (L) 3.5 - 5.2 g/dL    Total Bilirubin 0.6 0.0 - 1.2 mg/dL    Alkaline Phosphatase 193 (H) 40 - 129 U/L    ALT 21 0 - 40 U/L    AST 42 (H) 0 - 39 U/L   Troponin   Result Value Ref Range    Troponin 0.01 0.00 - 0.03 ng/mL   Brain Natriuretic Peptide   Result Value Ref Range    Pro-BNP 1,658 (H) 0 - 450 pg/mL   Lactic Acid, Plasma   Result Value Ref Range    Lactic Acid 1.1 0.5 - 2.2 mmol/L   Urinalysis   Result Value Ref Range    Color, UA Yellow Straw/Yellow    Clarity, UA SL CLOUDY Clear    Glucose, Ur Negative Negative mg/dL    Bilirubin Urine Negative Negative    Ketones, Urine 15 (A) Negative mg/dL    Specific Gravity, UA 1.025 1.005 - 1.030    Blood, Urine LARGE (A) Negative    pH, UA 5.0 5.0 - 9.0    Protein,  (A) Negative mg/dL    Urobilinogen, Urine 0.2 <2.0 E.U./dL    Nitrite, Urine Negative Negative    Leukocyte Esterase, Urine TRACE (A) Negative   Microscopic Urinalysis   Result Value Ref Range    WBC, UA 1-3 0 - 5 /HPF    RBC, UA >20 0 - 2 /HPF    Bacteria, UA NONE /HPF   EKG 12 Lead   Result Value Ref Range    Ventricular Rate 75 BPM    Atrial Rate 75 BPM    P-R Interval 198 ms    QRS Duration 130 ms    Q-T Interval 390 ms    QTc Calculation (Bazett) 435 ms    P Axis 12 degrees    R Axis -57 degrees    T Axis 21 degrees       RADIOLOGY:  Ct Abdomen Pelvis Wo Contrast Additional Contrast? None    Result Date: 2019  Patient MRN:  [id number] : 1932 Age: 80 years Gender: Male Order Date:  2019 11:30 AM EXAM: CT ABDOMEN PELVIS WO CONTRAST NUMBER OF IMAGES \ views:  371 INDICATION:  abdominal pain , history of abdominal aortic aneurysm COMPARISON: 3/6/2019 TECHNIQUE: Axial computerized tomography sections of the abdomen and pelvis with sagittal and coronal MPR reconstructions were obtained from the top of the diaphragm to the pelvis. Low-dose CT  acquisition technique included one of following options; 1 . Automated exposure control, 2. Adjustment of MA and or KV according to patient's size or 3. Use of iterative reconstruction. FINDINGS: Evaluation of the solid organs and vasculature are limited without the use of intravenous contrast. Evaluation of the gastrointestinal tract is limited without the use of oral contrast. THORACIC BASE: Patchy consolidation is seen within the left lung base. Calcified pleural plaques are seen within the left lung base. LIVER: Unremarkable. BILIARY: Multiple subcentimeter stones are seen within the dependent neck the gallbladder. PANCREAS: Unremarkable. SPLEEN: Multiple punctate calcifications are seen scattered throughout the spleen. ADRENALS:  Unremarkable. KIDNEYS:  A 0.5 cm stone is seen within the left kidney. A 0.2 cm stone is seen within the left kidney. No evidence for hydronephrosis is identified. PELVIS: A Elias catheter is present within the decompressed bladder. GI: No evidence of free air or bowel obstruction. The appendix is not visualized. A few sigmoid diverticuli are seen without evidence of diverticulitis. MSK: No acute osseous findings. OTHER: A fusiform abdominal aortic aneurysm is seen just inferior to the takeoff of the renal arteries, measuring 4.4 x 4.8 cm in size, previously 4.6 x 4.9 cm. A second abdominal aortic aneurysm is seen just above the bifurcation, measuring 5.7 x 5.1 cm in size, previously 5.6 x 4.9 cm in size. 1. Left lower lobe pneumonia. 2. 2 abdominal aortic aneurysms, the more inferior of which is minimally increased in size compared to prior exam. 3. Nonobstructing left nephrolithiasis. 4. Cholelithiasis.      Xr Chest Standard (2 Vw)    Result Date: 3/25/2019  Patient MRN:  [id number] : 1932 Age: 80 years Gender: Male Order Date:  3/25/2019 2:33 PM EXAM: XR CHEST (2 VW) COMPARISON: 2018 INDICATION: R05 Cough other FINDINGS: Median sternotomy wires are present. There are patchy opacities seen in mid right lung, left hilar location and left lung base. No obvious pleural effusion. Calcific pleural based plaques are present on the left. No pneumothorax. 1. Findings could suggest developing pneumonia at mid right lung field, left hilar location and left lower lobe. Short-term follow-up could be helpful for further evaluation. 2. Calcific plaques are seen on the left which could suggest asbestosis-related lung disease. Clinical correlation recommended. Xr Chest Portable    Result Date: 2019  Patient MRN: [id number] : 1932 Age:  80 years Gender: Male Order Date: 2019 11:30 AM Exam: XR CHEST PORTABLE Number of Images: 1 view Indication:  Weakness and fatigue with recent hx of pneumonia, suspected sepsis. Comparison: 2019 FINDINGS: Stable postoperative changes noted. Heart enlarged. Pulmonary vascularity congested. Suspect modest developing pulmonary edema at the left base minimal at the right base. The pneumonic infiltrate seen on the right on the prior study persists but is somewhat smaller. Neither costophrenic angle is blunted. Calcified pleural plaque on the left is suggestive of prior is best this exposure. 1. Congestive failure. 2. Persistent pneumonic infiltrate on the right. This may be followed to complete radiographic resolution. If this fails to resolve, contrast enhanced CT should be considered to assess for an underlying neoplasm. ------------------------- NURSING NOTES AND VITALS REVIEWED ---------------------------  Date / Time Roomed:  2019 11:07 AM  ED Bed Assignment:      The nursing notes within the ED encounter and vital signs as below have been reviewed.      Patient Vitals for the past 24 hrs:   BP Temp Temp src Pulse Resp SpO2 Height Weight   04/20/19 1305 (!) 173/65 -- -- 96 -- 94 % -- --   04/20/19 1119 (!) 165/70 -- -- 81 -- (!) 81 % -- --   04/20/19 1116 -- 98.7 °F (37.1 °C) Oral 96 18 95 % 5' 11\" (1.803 m) 168 lb (76.2 kg)       Oxygen Saturation Interpretation: Normal    ------------------------------------------ PROGRESS NOTES ------------------------------------------  Counseling:  I have spoken with the patient and discussed todays results, in addition to providing specific details for the plan of care and counseling regarding the diagnosis and prognosis. Their questions are answered at this time and they are agreeable with the plan of admission.    --------------------------------- ADDITIONAL PROVIDER NOTES ---------------------------------  This patient's ED course included: a personal history and physicial examination, re-evaluation prior to disposition, multiple bedside re-evaluations and IV medications    This patient has remained hemodynamically stable during their ED course. Diagnosis:  1. Community acquired pneumonia of left lower lobe of lung ([address]    2. Generalized weakness    3. Ambulatory dysfunction        Disposition:  Patient's disposition: Admit to med/surg floor  Patient's condition is stable.            Cristobal Norton DO  Resident  04/20/19 [id number]

## 2019-04-20 NOTE — ED NOTES
Please note that patient normally takes Plavix, but since catheter has been placed it has been on hold     Hilario Cobb RN  04/20/19 5260

## 2019-04-21 PROBLEM — J69.0 ASPIRATION PNEUMONIA DUE TO GASTRIC SECRETIONS (HCC): Status: ACTIVE | Noted: 2019-01-01

## 2019-04-21 NOTE — PROGRESS NOTES
Pt in acute respiratory distress. RR 36 bpm, sat 78% on RA. Rhonchi throughout all lung brady. Notified LEANN Barrett of patient change of condition. Rishi Erazo at bedside to evaluate. Orders received.

## 2019-04-21 NOTE — PLAN OF CARE
Problem: Falls - Risk of:  Goal: Will remain free from falls  Description  Will remain free from falls  4/21/2019 0108 by Agueda Bernabe RN  Outcome: Ongoing  4/20/2019 1514 by Clarence Robertson RN  Outcome: Ongoing  Goal: Absence of physical injury  Description  Absence of physical injury  4/21/2019 0108 by Agueda Bernabe RN  Outcome: Ongoing  4/20/2019 1514 by Clarence Robertson RN  Outcome: Met This Shift     Problem: Risk for Impaired Skin Integrity  Goal: Tissue integrity - skin and mucous membranes  Description  Structural intactness and normal physiological function of skin and  mucous membranes.   4/21/2019 0108 by Agueda Bernabe RN  Outcome: Ongoing  4/20/2019 1514 by Clarence Robertson RN  Outcome: Ongoing

## 2019-04-21 NOTE — PROGRESS NOTES
[facility] Quality Flow/Interdisciplinary Rounds Progress Note        Quality Flow Rounds held on April 21, 2019    Disciplines Attending:  Bedside Nurse, ,  and Nursing Unit [address] was admitted on 4/20/2019 11:07 AM    Anticipated Discharge Date:  Expected Discharge Date: 04/22/19    Disposition:    Gabriel Score:  Gabriel Scale Score: 20    Readmission Risk              Risk of Unplanned Readmission:        17           Discussed patient goal for the day, patient clinical progression, and barriers to discharge. The following Goal(s) of the Day/Commitment(s) have been identified:  await pulm plan.        Law Joseph  April 21, 2019

## 2019-04-21 NOTE — PROGRESS NOTES
Update to Dr Jeffery Lobato that pt c/o 5 min of chest pain which had complete relief after use of nitro

## 2019-04-21 NOTE — CONSULTS
Been asked to see patient in consultation for pneumonia    CHIEF COMPLAINT:  Fatigue weakness    HPI: This history has been obtain from patient's daughter who is at the bedside. Patient is a 69-year-old  gentleman with a past medical history of CVA, abdominal aortic aneurysm, hypertension and diabetes who underwent prostate resection at [facility] on Wednesday April 17th. Patient states that couple weeks before his procedure he was diagnosed with a pneumonia and treated as an outpatient and preprocedure he was cleared by his primary care physician. Daughter states that since his procedure he has become progressively fatigued and weak with decrease in appetite he had multiple falls on Friday and this decided to bring him in on Saturday. Wife also mentions that he has been having problems with drinking and have has intermittent episodes of coughing after drinking liquids. Patient had a CT abdomen and pelvis in the ER which showed left lower lobe infiltrate. Earlier today he had an episode of witnessed aspiration following that he became more hypoxic and desaturated downto 78% . He is currently he is on 6 L of oxygen and saturating 92-94% in no acute distress.       Past Medical History:    Past Medical History:   Diagnosis Date    [facility]     [facility]A [facility]re[facility] vascular accident) [facility]     Diabetes [facility] [facility]     Glaucoma     High blood sugar     Hypertension        Past Surgical History:    Past Surgical History:   Procedure Laterality Date    APPENDECTOMY      CARDIAC SURGERY      CORONARY ARTERY BYPASS GRAFT      LUMBAR SPINE SURGERY         Medications Prior to Admission:    Medications Prior to Admission: metoprolol tartrate (LOPRESSOR) 50 MG tablet, TAKE 1 TABLET BY MOUTH TWICE DAILY  metFORMIN (GLUCOPHAGE) 500 MG tablet, TAKE 1 TABLET BY MOUTH TWICE DAILY WITH MEALS  carboxymethylcellulose (THERATEARS) 1 % ophthalmic gel, Place 1 drop into both eyes 2 times daily   ramipril (ALTACE) 2.5 MG capsule, Take 1 capsule by mouth daily  atorvastatin (LIPITOR) 80 MG tablet, Take 1 tablet by mouth daily  Omega-3 Fatty Acids (FISH OIL) 1200 MG CAPS, Take 1,200 mg by mouth daily  docusate sodium (COLACE) 100 MG capsule, Take 200 mg by mouth nightly  finasteride (PROSCAR) 5 MG tablet, TK 1 T PO D  Specialty Vitamins Products (ICAPS LUTEIN-ZEAXANTHIN) TBCR, Take by mouth  aspirin EC 81 MG EC tablet, Take 162 mg by mouth daily   tamsulosin (FLOMAX) 0.4 MG capsule, Take 0.4 mg by mouth nightly   brinzolamide (AZOPT) 1 % ophthalmic suspension, 1 drop 2 times daily   bimatoprost (LUMIGAN) 0.01 % SOLN ophthalmic drops, Place 1 drop into both eyes nightly. brimonidine-timolol (COMBIGAN) 0.2-0.5 % ophthalmic solution, Place 1 drop into both eyes every 12 hours. clopidogrel (PLAVIX) 75 MG tablet, Take 1 tablet by mouth daily  nitroGLYCERIN (NITROSTAT) 0.4 MG SL tablet, Place 0.4 mg under the tongue    Allergies:    Patient has no known allergies. Social History:    reports that he quit smoking about 29 years ago. His smoking use included cigarettes. He smoked 1.00 pack per day. He has never used smokeless tobacco. He reports that he drinks alcohol. He reports that he does not use drugs. Family History:   family history includes Cancer in his sister; Cirrhosis in his brother; Kidney Disease in his mother. REVIEW OF SYSTEMS:  Constitutional: Positive for fatigue and weakness and weight loss  Skin: Denies pigmentation, dark lesions, and rashes   HEENT: Denies hearing loss, tinnitus, ear drainage, epistaxis, sore throat, and hoarseness. Cardiovascular: Denies palpitations, chest pain, and chest pressure. Respiratory: Positive for mild cough, denies dyspnea at rest, hemoptysis, apnea, and choking.   Gastrointestinal: Denies nausea, vomiting, poor appetite, diarrhea, heartburn or reflux  Genitourinary: Denies dysuria, frequency, urgency or hematuria  Musculoskeletal: Denies myalgias, muscle weakness, and bone pain  Neurological: Denies dizziness, vertigo, headache, and focal weakness  Psychological: Denies anxiety and depression  Endocrine: Denies heat intolerance and cold intolerance      PHYSICAL EXAM:    Vitals:  BP (!) 163/75   Pulse 74   Temp 98.3 °F (36.8 °C) (Oral)   Resp 16   Ht 5' 11\" (1.803 m)   Wt 173 lb 14.4 oz (78.9 kg)   SpO2 94%   BMI 24.25 kg/m²     General:  Awake, alert, oriented X 3. No apparent distress. HEENT:  Normocephalic, atraumatic. Pupils equal, round, reactive to light. No scleral icterus. No conjunctival injection. Normal lips, teeth, and gums. No nasal discharge.   Neck:  Supple; no bruits  Heart:  RRR, no murmurs, gallops, rubs  Lungs:  Coarse rhonchi bilaterally    Abdomen:  BS+, soft, nontender nondistended, no masses, no organomegaly Extremities:  No clubbing, cyanosis, or edema  Skin:  Warm and dry, no open lesions or rash  Neuro:  Cranial nerves 2-12 intact, no focal deficits    LABS:  Recent Results (from the past 24 hour(s))   CBC Auto Differential    Collection Time: 04/20/19 11:34 AM   Result Value Ref Range    WBC 4.7 4.5 - 11.5 E9/L    RBC 2.98 (L) 3.80 - 5.80 E12/L    Hemoglobin 9.2 (L) 12.5 - 16.5 g/dL    Hematocrit 29.4 (L) 37.0 - 54.0 %    MCV 98.7 80.0 - 99.9 fL    MCH 30.9 26.0 - 35.0 pg    MCHC 31.3 (L) 32.0 - 34.5 %    RDW 14.1 11.5 - 15.0 fL    Platelets 246 (L) 689 - 450 E9/L    MPV 10.6 7.0 - 12.0 fL    Neutrophils % 80.2 (H) 43.0 - 80.0 %    Immature Granulocytes % 0.4 0.0 - 5.0 %    Lymphocytes % 8.5 (L) 20.0 - 42.0 %    Monocytes % 9.0 2.0 - 12.0 %    Eosinophils % 1.7 0.0 - 6.0 %    Basophils % 0.2 0.0 - 2.0 %    Neutrophils # 3.76 1.80 - 7.30 E9/L    Immature Granulocytes # 0.02 E9/L    Lymphocytes # 0.40 (L) 1.50 - 4.00 E9/L    Monocytes # 0.42 0.10 - 0.95 E9/L    Eosinophils # 0.08 0.05 - 0.50 E9/L    Basophils # 0.01 0.00 - 0.20 E9/L    Polychromasia 1+     Poikilocytes 1+     Ovalocytes 1+     Basophilic Stippling 1+    Comprehensive Metabolic Panel    Collection Time: 04/20/19 11:34 AM   Result Value Ref Range    Sodium 140 132 - 146 mmol/L    Potassium 4.3 3.5 - 5.0 mmol/L    Chloride 104 98 - 107 mmol/L    CO2 24 22 - 29 mmol/L    Anion Gap 12 7 - 16 mmol/L    Glucose 110 (H) 74 - 99 mg/dL    BUN 11 8 - 23 mg/dL    CREATININE 1.0 0.7 - 1.2 mg/dL    GFR Non-African American >60 >=60 mL/min/1.73    GFR African American >60     Calcium 8.2 (L) 8.6 - 10.2 mg/dL    Total Protein 6.3 (L) 6.4 - 8.3 g/dL    Alb 3.3 (L) 3.5 - 5.2 g/dL    Total Bilirubin 0.6 0.0 - 1.2 mg/dL    Alkaline Phosphatase 193 (H) 40 - 129 U/L    ALT 21 0 - 40 U/L    AST 42 (H) 0 - 39 U/L   Troponin    Collection Time: 04/20/19 11:34 AM   Result Value Ref Range    Troponin 0.01 0.00 - 0.03 ng/mL   Brain Natriuretic Peptide    Collection Time: 04/20/19 11:34 AM   Result Value Ref Range    Pro-BNP 1,658 (H) 0 - 450 pg/mL   Lactic Acid, Plasma    Collection Time: 04/20/19 11:34 AM   Result Value Ref Range    Lactic Acid 1.1 0.5 - 2.2 mmol/L   Urinalysis    Collection Time: 04/20/19 11:34 AM   Result Value Ref Range    Color, UA Yellow Straw/Yellow    Clarity, UA SL CLOUDY Clear    Glucose, Ur Negative Negative mg/dL    Bilirubin Urine Negative Negative    Ketones, Urine 15 (A) Negative mg/dL    Specific Gravity, UA 1.025 1.005 - 1.030    Blood, Urine LARGE (A) Negative    pH, UA 5.0 5.0 - 9.0    Protein,  (A) Negative mg/dL    Urobilinogen, Urine 0.2 <2.0 E.U./dL    Nitrite, Urine Negative Negative    Leukocyte Esterase, Urine TRACE (A) Negative   Rapid influenza A/B antigens    Collection Time: 04/20/19 11:34 AM   Result Value Ref Range    Influenza A by PCR Not Detected Not Detected    Influenza B by PCR Not Detected Not Detected   Respiratory Panel, Film Array    Collection Time: 04/20/19 11:34 AM   Result Value Ref Range    Film Array Adenovirus       Result: Not Detected  *  *  Normal Range: Not Detected      Film Array Coronavirus HKU1 Result: Not Detected  *  *  Normal Range: Not Detected      Film Array Conoravirus NL63       Result: Not Detected  *  *  Normal Range: Not Detected      Film Array Coronavirus 229E       Result: Not Detected  *  *  Normal Range: Not Detected      Film Array Coronavirus OC43       Result: Not Detected  *  *  Normal Range: Not Detected      Film Array Influenza A Virus       Result: Not Detected  *  *  Normal Range: Not Detected      Film Array Influenza A Virus H1       Result: Not Detected  *  *  Normal Range: Not Detected      Film Array Influenza A Virus 09H1       Result: Not Detected  *  *  Normal Range: Not Detected      Film Array Influenza A Virus H3       Result: Not Detected  *  *  Normal Range: Not Detected      Film Array Influenza B       Result: Not Detected  *  *  Normal Range: Not Detected      Film Array Metapneumovirus       Result: Not Detected  *  *  Normal Range: Not Detected      Film Array Parainfluenza Virus 1       Result: Not Detected  *  *  Normal Range: Not Detected      Film Array Parainfluenza Virus 2       Result: Not Detected  *  *  Normal Range: Not Detected      Film Array Parainfluenza Virus 3       Result: Not Detected  *  *  Normal Range: Not Detected      Film Array Parainfluenza Virus 4       Result: Not Detected  *  *  Normal Range: Not Detected      Film Array Respiratory Syncitial Virus       Result: Not Detected  *  *  Normal Range: Not Detected      Film Array Rhinovirus/Enterovirus       Result: Not Detected  *  *  Normal Range: Not Detected      Film Array Bordetella Pertusis       Result: Not Detected  *  *  Normal Range: Not Detected      Film Array Chlamydophilia Pneumoniae       Result: Not Detected  *  *  Normal Range: Not Detected      Film Array Mycoplasma Pneumoniae       Result: Not Detected  *  *  Normal Range: Not Detected     Microscopic Urinalysis    Collection Time: 04/20/19 11:34 AM   Result Value Ref Range    WBC, UA 1-3 0 - 5 /HPF    RBC, UA >20 0 - 2 /HPF    Bacteria, UA NONE /HPF   EKG 12 Lead    Collection Time: 04/20/19 11:51 AM   Result Value Ref Range    Ventricular Rate 75 BPM    Atrial Rate 75 BPM    P-R Interval 198 ms    QRS Duration 130 ms    Q-T Interval 390 ms    QTc Calculation (Bazett) 435 ms    P Axis 12 degrees    R Axis -57 degrees    T Axis 21 degrees   POCT Glucose    Collection Time: 04/20/19  5:15 PM   Result Value Ref Range    Meter Glucose 185 (H) 74 - 99 mg/dL   POCT Glucose    Collection Time: 04/20/19  8:09 PM   Result Value Ref Range    Meter Glucose 101 (H) 74 - 99 mg/dL   Comprehensive Metabolic Panel w/ Reflex to MG    Collection Time: 04/21/19  5:05 AM   Result Value Ref Range    Sodium 137 132 - 146 mmol/L    Potassium reflex Magnesium 4.4 3.5 - 5.0 mmol/L    Chloride 103 98 - 107 mmol/L    CO2 25 22 - 29 mmol/L    Anion Gap 9 7 - 16 mmol/L    Glucose 94 74 - 99 mg/dL    BUN 10 8 - 23 mg/dL    CREATININE 1.0 0.7 - 1.2 mg/dL    GFR Non-African American >60 >=60 mL/min/1.73    GFR African American >60     Calcium 8.2 (L) 8.6 - 10.2 mg/dL    Total Protein 5.8 (L) 6.4 - 8.3 g/dL    Alb 2.9 (L) 3.5 - 5.2 g/dL    Total Bilirubin 0.6 0.0 - 1.2 mg/dL    Alkaline Phosphatase 172 (H) 40 - 129 U/L    ALT 19 0 - 40 U/L    AST 36 0 - 39 U/L   Magnesium    Collection Time: 04/21/19  5:05 AM   Result Value Ref Range    Magnesium 1.6 1.6 - 2.6 mg/dL   CBC auto differential    Collection Time: 04/21/19  5:05 AM   Result Value Ref Range    WBC 4.6 4.5 - 11.5 E9/L    RBC 2.83 (L) 3.80 - 5.80 E12/L    Hemoglobin 8.9 (L) 12.5 - 16.5 g/dL    Hematocrit 27.6 (L) 37.0 - 54.0 %    MCV 97.5 80.0 - 99.9 fL    MCH 31.4 26.0 - 35.0 pg    MCHC 32.2 32.0 - 34.5 %    RDW 14.3 11.5 - 15.0 fL    Platelets 188 (L) 719 - 450 E9/L    MPV 11.0 7.0 - 12.0 fL    Neutrophils % 66.9 43.0 - 80.0 %    Immature Granulocytes % 0.7 0.0 - 5.0 %    Lymphocytes % 16.7 (L) 20.0 - 42.0 %    Monocytes % 12.7 (H) 2.0 - 12.0 %    Eosinophils % 2.6 0.0 - 6.0 %    Basophils % 0.4 0.0 - 2.0 %    Neutrophils # 3.05 1.80 - 7.30 E9/L    Immature Granulocytes # 0.03 E9/L    Lymphocytes # 0.76 (L) 1.50 - 4.00 E9/L    Monocytes # 0.58 0.10 - 0.95 E9/L    Eosinophils # 0.12 0.05 - 0.50 E9/L    Basophils # 0.02 0.00 - 0.20 E9/L   Phosphorus    Collection Time: 04/21/19  5:05 AM   Result Value Ref Range    Phosphorus 2.6 2.5 - 4.5 mg/dL   CK    Collection Time: 04/21/19  5:05 AM   Result Value Ref Range    Total  (H) 20 - 200 U/L   POCT Glucose    Collection Time: 04/21/19  6:33 AM   Result Value Ref Range    Meter Glucose 101 (H) 74 - 99 mg/dL     Impression       1. Left lower lobe pneumonia. 2. 2 abdominal aortic aneurysms, the more inferior of which is   minimally increased in size compared to prior exam.   3. Nonobstructing left nephrolithiasis. 4. Cholelithiasis.                 ASSESSMENT:    1. Acute hypoxic respiratory failure most likely secondary to aspiration pneumonia  2. Commonly acquired pneumonia /Aspiration pneumonia   3. Dysphagia  4. Failure to thrive  5. History of stroke    PLAN:    1. Would recommend to start Unasyn 3 g every 6 hours  2. The patient n.p.o. check a video swallow  3. Check respiratory panel strep and legionella urinary antigens  4. Wean FiO2 for saturations above 92%  5. Aspiration precautions  6. Albuterol as needed  7. Follow chest x-ray       Nikhil Sifuentes  8:17 AM  4/21/2019        NOTE: This report, in part or full, may have been transcribed using voice recognition software. Every effort was made to ensure accuracy; however, inadvertent computerized transcription errors may be present. Please excuse any transcriptional grammatical or spelling errors that may have escaped my editorial review.

## 2019-04-21 NOTE — PROGRESS NOTES
[facility] Hospitalist   Progress Note    Admitting Date and Time: 4/20/2019 11:07 AM  Admit Dx: Community acquired pneumonia of left lower lobe of lung ([address] [J18.1]  Community acquired pneumonia of left lower lobe of lung ([address] [J18.1]    Subjective:    Patient was admitted with Community acquired pneumonia of left lower lobe of lung ([address] [J18.1]  Community acquired pneumonia of left lower lobe of lung ([address] [J18.1]. Patient was seen after his CT scan was completed area patient is sitting up in a chair visiting with his son wife and daughter. Patient denies any complaints of shortness of breath, chest pain, nausea, fever or chills. ROS: denies fever, chills, cp, sob, n/v, HA unless stated above.      aspirin EC  162 mg Oral Daily    atorvastatin  80 mg Oral Daily    latanoprost  1 drop Both Eyes Daily    dorzolamide  1 drop Both Eyes TID    docusate sodium  200 mg Oral Nightly    finasteride  5 mg Oral Daily    metoprolol tartrate  50 mg Oral BID    tamsulosin  0.4 mg Oral Nightly    sodium chloride flush  10 mL Intravenous 2 times per day    enoxaparin  40 mg Subcutaneous Daily    cefTRIAXone (ROCEPHIN) IV  1 g Intravenous Q24H    azithromycin  500 mg Intravenous Q24H    insulin lispro  0-12 Units Subcutaneous TID WC    insulin lispro  0-6 Units Subcutaneous Nightly    brimonidine  1 drop Both Eyes Q12H    And    timolol  1 drop Both Eyes Q12H       sodium chloride flush 10 mL PRN   albuterol sulfate HFA 2 puff Q6H PRN   sodium chloride flush 10 mL PRN   magnesium hydroxide 30 mL Daily PRN   ondansetron 4 mg Q6H PRN   acetaminophen 650 mg Q4H PRN   glucose 15 g PRN   dextrose 12.5 g PRN   glucagon (rDNA) 1 mg PRN   dextrose 100 mL/hr PRN        Objective:    BP (!) 163/75   Pulse 74   Temp 98.3 °F (36.8 °C) (Oral)   Resp 16   Ht 5' 11\" (1.803 m)   Wt 173 lb 14.4 oz (78.9 kg)   SpO2 94%   BMI 24.25 kg/m²   General Appearance: well-developed and well nourished, in no benign    CAD (coronary artery disease)    CVA (cerebral vascular accident) ([facility] [address]    Fall    Generalized weakness    Thrombocytopenia ([facility] [address]    Essential hypertension    Controlled type 2 diabetes mellitus without complication ([facility] [address]  Resolved Problems:    * No resolved hospital problems. *      Plan:  1. Left Lower Lobe Community Acquired Pneumonia - admit telemetry - supplemental oxygen as needed - IV Rocephin and Zithromax - no c/o cough - may need breathing treatment but not at this time - pulmonary consulted   2. Fall at Home - secondary to generalized weakness - PT/OT consulted   3. Diabetes Mellitus Type II - medium dose Humalog sliding scale added and holding Metformin  4. Essential Hypertension - uncontrolled - holding Altace but will add Hydralazine if SBP is greater than 180   5. Coronary Artery Disease - no chest pain    6. Hyperlipidemia - on statin  7. History of CVA - currently on Aspirin - was on Plavix but currently on HOLD until either urine clears or lagos is removed  8. Recent Prostate Surgery - lagos catheter intact - redish urine noted  9. Anemia - remains stable             Electronically signed by LEANN Perez CNP on 4/21/2019 at 9:34 AM   HOSPITALIST ATTENDING PHYSICIAN NOTE 4/21/2019 1634PM:    Details of the evaluation - subjective assessment (including medication profile, past medical, family and social history when applicable), examination, review of lab and test data, diagnostic impressions and medical decision making - performed by LEANN Perez CNP, were discussed with me on the date of service and I agree with clinical information herein unless otherwise noted. The patient has been evaluated by me personally earlier today. Pt reports no fevers, chills,n/v, cough or sob. Pt c/o generalized weakness.     Exam: heart reg at rate of 76,lungs cta, abd pos bs soft nt, ext neg for le edema    I agree with the assessment and plan of LEANN Perez CNP    Updated by nursing    Acute respiratory failure with hypoxia  Pneumonia probable aspiration  dysphagia  Dm type 2 controlled  Generalized weakness  thrombocytopenia  htn  Hyperlipidemia  Recent prostate surgery  Hx of cva         Electronically signed by Nadya Berger D.O.   Hospitalist  [facility] Hospitalist Service at [facility]

## 2019-04-22 NOTE — PLAN OF CARE
Problem: Falls - Risk of:  Goal: Will remain free from falls  Description  Will remain free from falls  Outcome: Met This Shift  Goal: Absence of physical injury  Description  Absence of physical injury  Outcome: Met This Shift     Problem: Respiratory  Goal: Supplemental O2 requirements decreased  Outcome: Met This Shift     Problem: Risk for Impaired Skin Integrity  Goal: Tissue integrity - skin and mucous membranes  Description  Structural intactness and normal physiological function of skin and  mucous membranes.   Outcome: Ongoing     Problem: Breathing Pattern - Ineffective:  Goal: Ability to achieve and maintain a regular respiratory rate will improve  Description  Ability to achieve and maintain a regular respiratory rate will improve  Outcome: Ongoing     Problem: Respiratory  Goal: O2 Sat > 90%  Outcome: Ongoing     Problem: Safety:  Goal: Ability to chew and swallow food without choking will improve  Description  Ability to chew and swallow food without choking will improve  Outcome: Ongoing  Goal: Signs and symptoms of aspiration will decrease  Description  Signs and symptoms of aspiration will decrease  Outcome: Ongoing

## 2019-04-22 NOTE — PROGRESS NOTES
Sahara Shetty M.D.,[facility]  Blanquita Lemos D.O., F.A.C.O.I., Nasim Porter M.D. Joel Torres M.D., Castro Elena M.D. Daily Pulmonary Progress Note    Patient:  Rehan Crespo 80 y.o. male MRN: [id number]     Date of Service: 4/22/2019      Synopsis     We are following patient for hypoxia   \"CC\" cough     Code status:full       Subjective      Patient was seen and examined. Sitting up  in chair, he feels he still have very weak cough . Await swallow  test for today . Aspiration precautions continue . Cough non productive . His son is at bedside. He denies SOB . He remains on 6 L NC  98% . Review of Systems:  Constitutional: Denies fever, weight loss, night sweats, and fatigue  Skin: Denies pigmentation, dark lesions, and rashes   HEENT: Denies hearing loss, tinnitus, ear drainage, epistaxis, sore throat, and hoarseness. Cardiovascular: Denies palpitations, chest pain, and chest pressure. Respiratory: + cough,- dyspnea at rest, hemoptysis, apnea, and choking.   Gastrointestinal: Denies nausea, vomiting, poor appetite, diarrhea, heartburn or reflux  Genitourinary: Denies dysuria, frequency, urgency or hematuria  Musculoskeletal: Denies myalgias, muscle weakness, and bone pain, + generalized weakness   24-hour events:  None     Objective   Vitals: BP (!) 147/68   Pulse 99   Temp 98.9 °F (37.2 °C) (Oral)   Resp 18   Ht 5' 11\" (1.803 m)   Wt 165 lb 8 oz (75.1 kg)   SpO2 98%   BMI 23.08 kg/m²     I/O:    Intake/Output Summary (Last 24 hours) at 4/22/2019 1235  Last data filed at 4/22/2019 0515  Gross per 24 hour   Intake --   Output 1200 ml   Net -1200 ml                        CURRENT MEDS :  Scheduled Meds:   ampicillin-sulbactam  3 g Intravenous Q6H    aspirin EC  162 mg Oral Daily    atorvastatin  80 mg Oral Daily    latanoprost  1 drop Both Eyes Daily    dorzolamide  1 drop Both Eyes TID    docusate sodium  200 mg Oral Nightly    finasteride  5 mg Oral Daily failure most likely secondary to aspiration pneumonia  2. Commonly acquired pneumonia /Aspiration pneumonia   3. Dysphagia  4. Failure to thrive  5. History of stroke        Plan:     1.  continue  Unasyn 3 g every 6 hours  2. The patient n.p.o. check a video swallow  3. Check respiratory panel strep and legionella urinary antigens  4. Wean FiO2 for saturations above 92%  5. Aspiration precautions  6. Albuterol as needed  7. Follow chest x-ray   8. Will add pep/fluttter incentive spirometry with chest vest BID       This plan of care was reviewed in collaboration with Dr. Guerita Gonzalez  Electronically signed by LEANN Barr on 4/22/2019 at 12:35 PM    I personally saw, examined, and cared for the patient. Labs, medications, radiographs reviewed.  I agree with history exam and plans detailed in NP note with the following additions:    Sitting up in the chair on supplemental oxygen  Feeling better today  Having a cough which is very weak and he has difficulty expectorating  Add flutter/incentive, chest vest  Agree with unasyn  Await swallow eval today    Electronically signed by Luiz Cervantes MD on 4/22/2019 at 1:15 PM

## 2019-04-22 NOTE — PROGRESS NOTES
Patient's daughter and wife updated and educated on diet of thickened liquids (necture) and pureed foods. Education provided on appropriate diet for patient and offering small bites and sips and keeping patient seated upright for meals.    Electronically signed by Merna Gan RN on 4/22/2019 at 4:34 PM

## 2019-04-22 NOTE — PROGRESS NOTES
Speech Language Pathology      NAME:  Preston Crespo  :  1932  DATE: 2019  ROOM:  [facility]    Patient seen for swallow therapy 15 minutes. Reviewed current solid/liquid consistency diet recommendation for Puree consistency solids (NDD Level 1) and nectar consistency liquids , and discussed compensatory strategies to ensure safe PO intake. Reviewed aspiration precautions. patient will require ongoing education regarding dysphagia secondary to impaired cognition.       Patient Active Problem List   Diagnosis    Diabetes mellitus without complication ([address]    Hyperlipidemia    Essential hypertension, benign    Abdominal aortic aneurysm (AAA) without rupture ([address]    CAD (coronary artery disease)    Asymptomatic bilateral carotid artery stenosis    Acute CVA (cerebrovascular accident) ([address]    Acute cerebrovascular accident (CVA) ([address]    CVA (cerebral vascular accident) ([address]    Community acquired pneumonia of left lower lobe of lung ([address]    Fall    Generalized weakness    Thrombocytopenia ([address]    Essential hypertension    Controlled type 2 diabetes mellitus without complication ([address]    Aspiration pneumonia due to gastric secretions ([address]    Acute respiratory failure with hypoxia (HCC)    Aspiration pneumonia ([address]         Maryann Ferrera MSCCC/SLP  Speech Language Pathologist  [id number]

## 2019-04-22 NOTE — PROGRESS NOTES
Occupational Therapy  OCCUPATIONAL THERAPY INITIAL EVALUATION      Date:2019  Patient Name: Benji Valentin  MRN: [id number]  : 1932  Room: [address] [place] [address]    Evaluating OT: Art Allen OTR/L [id number]    AM-PAC Daily Activity Raw Score:     Recommended Adaptive Equipment: TBD    Diagnosis: pneumonia. Pt presents to ED due to onset of weakness, confusion and fall at home. Pertinent Medical History: AAA, h/o CVA, DM, glaucoma, HTN   Precautions:  Falls, O2     Home Living: Pt lives with wife in a single story home with 1-2 steps HR on entry. Bathroom setup: tub/shower combo     Prior Level of Function: Mod I with ADLs, Mod I with simple IADLs; completed functional mobility WW in the last few weeks only. Pt's son reports he stops in daily for higher level IADLs but reports both the patient and his wife are independent in their own home. Pt's son reports the patient is typically alert and oriented. Pain Level: no reported pain or observable pain throughout session    Cognition: A&O: 2/4. Pt is oriented to self and grossly oriented to place able to report probably a hospital. Disoriented to temporal concepts and situation. Problem solving:  poor   Judgement/safety:  poor     Functional Assessment:   Initial Eval Status  Date: 19 Treatment session:  Short Term Goals  Treatment frequency: 2-5x/wk PRN x1-3 wks   Feeding Min A     Grooming Mod A  Min A   UB Dressing Mod A  Min A   LB Dressing Dependent  Donning B socks  Mod A    Bathing Max A  Mod A   Toileting Dependent  Mod A   Bed Mobility  Supine to sit: NT seated in chair on entry     Functional Transfers STS: Max A  Min A   Functional Mobility Max A with HHA  1 small step forward and back to armchair  Min A during ADLs   Balance Sitting: poor plus.  Significant L sided lean    Standing: poor     Activity Tolerance Poor plus  standing scott x4-5 min with fair/fair minus balance during self care tasks   L UE strength Shoulder: 2-/5  Elbow: 3-/5  Wrist: 2-/5  Hand: 3-/5  L UE: 3+/5     ADL comments: Pt is limited in completion of self care tasks due to weakness, fatigue, balance deficits, L UE weakness and decreased cognitive status during this session. Strength ROM Additional Info:    RUE  4-/5 WFL good  and FMC/dexterity noted during ADL tasks     LUE Shoulder: 2-/5  Elbow: 3-/5  Wrist: 2-/5  Hand: 3-/5 PROM: WFL all joints  AROM: shoulder flexion- 60, abd- 50  Elbow- flexion 90 degrees  Wrist- flexion 5, extension 5  fair  and FMC/dexterity noted during ADL tasks    Mild joint laxity L shoulder posteriorly         Hearing: [place]  Vision: severe visual deficit, unable to assess any visual/perceptual deficits  Sensation:  No c/o numbness or tingling  Tone: hypotonic L UE  Edema: none                            Comments/Treatment: Patient educated on adapted techniques for completion of ADL, safe functional transfers and mobility. Patient required cues for follow through with proper hand/foot placement, pacing, safety, precautions and technique in functional transfers, functional mobility, and LB dressing in preparation for maximum independence in all self care tasks. OT educates patient on proper positioning of L UE while seated in chair with good understanding from patients son. OT corresponds with PT who reports has spoken to nursing staff regarding L sided weakness and potential need for follow up.      Eval Complexity: Low    Assessment of current deficits   Functional mobility [x]  ADLs [x] Strength [x]  Cognition []  Functional transfers  [x] IADLs [x] Safety Awareness [x]  Endurance [x]  Fine Motor Coordination [x] Balance [x] Vision/perception [] Sensation []   Gross Motor Coordination [x] ROM [] Delirium []                  Motor Control []    Plan of Care:   ADL retraining [x]   Equipment needs [x]   Neuromuscular re-education [x] Energy Conservation Techniques [x]  Functional Transfer training [x] Patient and/or Family Education [x]  Functional Mobility training [x]  Environmental Modifications [x]  Cognitive re-training []   Compensatory techniques for ADLs [x]  Splinting Needs []   Positioning to improve overall function [x]   Therapeutic Activity [x]              Therapeutic Exercise  [x]  Visual/Perceptual: []    Delirium prevention/treatment  []   Other:  []    Rehab Potential: Good for established goals     Patient / Family Goal: To get better. Patient and/or family were instructed on functional diagnosis, prognosis/goals and OT plan of care. Pt and son verbalized good understanding. Upon arrival, patient seated in chair. At end of session, patient seated in chair with call light and phone within reach, all lines and tubes intact. Pt would benefit from continued skilled OT to increase safety and independence with completion of ADL/IADL tasks for functional independence and quality of life. Bed/chair alarm: ON. Son present.     Low Evaluation + 10 timed treatment minutes  Tx Time in: 1100  Tx Time out: 1110    Evaluation time includes thorough review of current medical information, gathering information on past medical history/social history and prior level of function, completion of standardized testing/informal observation of tasks, assessment of data, and development of POC/Goals    Jcarlos Look OTR/L  [id number]

## 2019-04-22 NOTE — PROGRESS NOTES
Spoke with Dr. Sid Martínez the urologist who inserted lagos catheter, and he wants the patient to keep the lagos catheter in until his urine is clear.       Electronically signed by Surekha Pina RN on 4/22/2019 at 9:07 AM

## 2019-04-22 NOTE — PROGRESS NOTES
Physical Therapy    Facility/Department: [facility] MED SURG  Initial Assessment    NAME: Gigi Crespo  : 1932  MRN: [id number]    Date of Service: 2019       REQUIRES PT FOLLOW UP: Yes       Patient Diagnosis(es): The primary encounter diagnosis was Community acquired pneumonia of left lower lobe of lung ([address] Diagnoses of Generalized weakness and Ambulatory dysfunction were also pertinent to this visit. has a past medical history of AAA (abdominal aortic aneurysm) ([address] [address] CVA (cerebral vascular accident) ([address] [address] Diabetes ([address] [address] [address] [facility] Glaucoma, High blood sugar, and Hypertension. has a past surgical history that includes Appendectomy; Coronary artery bypass graft; Cardiac surgery; and Lumbar spine surgery. Evaluating Therapist: Nida Elder PT       Room #: 517   DIAGNOSIS:  PNA , slid out of chair   PRECAUTIONS: falls     Social:  Pt lives with  Wife  in a  1  floor plan  2  steps and  1 rails to enter. Prior to admission pt walked with  Marveen Copper until recent decline over past week per son. Pt sleeps in recliner. Has caregivers 4 x/wk      Initial Evaluation  Date: 19 Treatment      Short Term/ Long Term   Goals   Was pt agreeable to Eval/treatment? yes      Does pt have pain?  none reported      Bed Mobility  Rolling:  NT   Supine to sit:  Max assist   Sit to supine:  Max assist   Scooting: max assist   Min assist    Transfers Sit to stand:  Max assist   Stand to sit: max assist   Stand pivot: max assist   Min assist    Ambulation    2  feet with  ww  with max assist    10 feet with  ww  with  Min assist    LE ROM  WFL      LE strength  R LE : 4/ 5   L LE : 4-/ 5      AM- PAC RAW score  10/ 24            Pt is alert and Oriented x 1   Balance: max assist in stand, L lateral lean in sit   Endurance: poor   Chair alarm: Yes      ASSESSMENT  Pt displays functional ability as noted in the objective portion of this evaluation.       Comments/Treatment:   Pt L LE 4-/ 5 with MMT, but drags L LE with

## 2019-04-22 NOTE — PROGRESS NOTES
SPEECH LANGUAGE PATHOLOGY     VIDEOFLUOROSCOPIC STUDY OF SWALLOWING (MBSS)  NAME:  Terence Crespo  :  1932  ROOM:  4906/[id number] DATE: 2019    DYSPHAGIA DIAGNOSIS:  Moderate to severe pharyngeal dysphagia   DIET RECOMMENDATIONS:  Puree consistency solids (NDD Level 1) and nectar consistency liquids  THERAPY RECOMMENDATIONS:    [] Repeat swallow study in    [x] Therapy is recommended to:      [x] Improve oral motor control and coordination for functional mastication of solids    [x] Improve tongue base retraction    [x] Improve laryngeal strength and range of motion   [] Provide Deep Pharyngeal Neuromuscular Stimulation (DPNS)   [] Address cricopharyngeal dysfunction (Shaker Exercises)    [x]  Provide ongoing meal endurance analysis to ensure tolerance for prescribed diet and provide ongoing pt and caregiver education regarding compensatory strategies needed and aspiration precautions.      []Other:    [] Therapy is not recommended   FEEDING RECOMMENDATIONS: (check all that apply)  Supervision with              PO intake  x         Eat in upright position          x  Small bites/sips from cup    x        Alternate solids and liquids  x             Check for oral pocketing             Place food on left side           of tongue Place food on right side of tongue No straw  x            Spoon sip liquids              Liquids via cup              administration   x    Double swallow           x Multiple swallow              Throat clear/swallow    x     Effortful swallow            Tia Quivers water protocol             Google water  Protocol  x   MEDICATION ADMINISTRATION:  Administer medication as per patient preference          x Administer medication whole,   one at a time, with water                 Administer medication whole/crushed in applesauce/pudding             Administer medication via   IV/NG/PEG tube ______________________________________________________________  OBJECTIVE ASSESSMENT:  Patient positioning: Seated 70-90°  Viewing Plane(s): LATERAL ONLY  Contrast: commercially prepared, non-standardized barium viscosities; graduated from thin liquid to pudding consistency. Administered via tsp (5 ml boluses), by cup or straw in single and sequentially swallowed boluses as tolerated. Solid evaluated with 1/2 shortbread cookie/3 ml barium pudding coated as tolerated. MBSImP Results:   Lip closure for intraoral bolus containment resulted in no labial escape. Tongue control during bolus hold maintained a cohesive bolus held between tongue to palate seal. Bolus preparation and mastication resulted in timely and efficient chewing and mashing. Bolus transport/lingual motion was with brisk tongue motion. Oral residue was not observed. There was complete oral clearance. Initiation of the pharyngeal swallow occurred as the bolus head reached the pyriform sinuses. Soft palate elevation resulted in no bolus between the soft palate and the pharyngeal wall. Laryngeal elevation demonstrated partial superior movement of the thyroid cartilage with partial approximation of the arytenoids to the epiglottic petiole. Anterior hyoid excursion demonstrated partial anterior movement. Epiglottic movement resulted in partial inversion. Laryngeal vestibular closure was incomplete, as indicated by a narrow column of air or contrast within the laryngeal vestibule at the height of the swallow. Pharyngeal stripping wave was present but diminished. Pharyngeal contraction could not be determined due to logistical reasons not related to physiologic impairment. Pharyngoesophageal segment opening was completely distended for complete duration with no obstruction of bolus flow. Tongue base retraction allowed a collection of residue between the retracted tongue base and the posterior pharyngeal wall.  A collection of residue remained within or on the pharyngeal structures Esophageal clearance in the upright position could not be assessed due to logistical reasons not related to physiologic impairment.      Significant aspiration with thin via cup sip immediate cough    Shallow penetration with nectar thick no cough--cued throat clear only moderately effective    No penetration with puree or solids               SUBJECTIVE ASSESSMENT/PATIENT REPORT:  Symptoms reported by patient: (check all that apply)  Drooling              Coughing          x Choking            extubated              Pain during swallowing              Globus sensation            History of aspiration              and/or pneumonia Difficulty swallowing pills              Mental status:    Alert          x Responsive          x  Cooperative          x    Reduced cognition          x     Lethargic                Impulsive              Uncooperative                Combative             Unresponsive              Current respiratory status:  Room air              Nasal cannula          x O2 mask             Non-rebreather mask              Tracheostomy,              room air Tracheostomy,              supplemental O2  BiPAP            Vent dependent              Current nutritional status:   [x] Oral   [] NPO  Oral mechanism examination:     [] Adequate lingual/labial strength [x] Generalized oral weakness    [] Left labiobuccal weakness  [] Right labiobuccal weakness    [] Left lingual deviation  [] Right lingual deviation     [] Oral apraxia             [] CNT  Dentition:    [x] Natural    [] Missing teeth/teeth in poor repair    [] Edentulous    [] Dentures    [] Implants  Vocal quality prior to PO intake:    [] WFL  [x] Weak  [] Wet  Factors affecting performance:   No difficulties participating   in assessment          x Impairment or difficulty noted   in mental status            Impairment or difficulty noted   in following directions            Impairment or difficulty noted In endurance              EDUCATION/GOAL PLANNING:       Education completed with:       [x] patient     [] family  Speech Pathologist (SLP) completed education with the patient/family regarding type of swallowing impairment. Reviewed current solid/liquid consistency diet recommendations and discussed compensatory strategies to ensure safe PO intake if recommended. Reviewed aspiration precautions. Encouraged pt and/or family to engage SLP in unstructured Q&A session relative to identified deficit areas. Patient will benefit from ongoing education    Regarding:     [x] diagnosis     [x] treatment     [x] prognosis     [x] plan of care  [x] Patient was an active participant in goal planning process. [] Patient was unable to participate in goal planning process. [] Goal planning not appropriate at this time as intervention was not recommended. This plan will be re-evaluated and revised in 1 week   if warranted. Prognosis for improvements or maintaining present level of function is:       [x] good          [] fair       [] guarded       [] poor  [x] The admitting diagnosis and active problem list as listed below have been reviewed prior to the initiation of this evaluation.   Community acquired pneumonia of left lower lobe of lung ([address] [J18.1]  Community acquired pneumonia of left lower lobe of lung ([address] [J18.1]  Aspiration pneumonia due to gastric secretions, unspecified laterality, unspecified part of lung ([address] [J69.0]  Patient Active Problem List   Diagnosis    Diabetes mellitus without complication ([address]    Hyperlipidemia    Essential hypertension, benign    Abdominal aortic aneurysm (AAA) without rupture ([address]    CAD (coronary artery disease)    Asymptomatic bilateral carotid artery stenosis    Acute CVA (cerebrovascular accident) ([address]    Acute cerebrovascular accident (CVA) ([address]    CVA (cerebral vascular accident) ([address]   [address] acquired pneumonia of left lower lobe of lung ([address]    Fall  Generalized weakness    Thrombocytopenia (HCC)    Essential hypertension    Controlled type 2 diabetes mellitus without complication (HCC)    Aspiration pneumonia due to gastric secretions (HCC)    Acute respiratory failure with hypoxia (HCC)    Aspiration pneumonia (HCC)       Malnutrition indicators have been identified?   no  Dietitian consulted? no     Staci Conner MSCCC/SLP  Speech Language Pathologist  [id number]

## 2019-04-22 NOTE — PROGRESS NOTES
[facility] Hospital[facility]   Progress Note    Admitting Date and Time: 4/20/2019 11:07 AM  Admit Dx: Community acquired pneumonia of left lower lobe of lung ([address] [J18.1]  Community acquired pneumonia of left lower lobe of lung ([address] [J18.1]  Aspiration pneumonia due to gastric secretions, unspecified laterality, unspecified part of lung ([address] [J69.0]    Subjective:    Patient was admitted with Community acquired pneumonia of left lower lobe of lung ([address] [J18.1]  Community acquired pneumonia of left lower lobe of lung ([address] [J18.1]  Aspiration pneumonia due to gastric secretions, unspecified laterality, unspecified part of lung ([address] [J69.0]. Patient states that he feels tired today. He states that he was having the chest pain yesterday when he got up to chair after using inhaler. He states that this has resolved and has not recurred. Son at bedside, with no concerns today. Per RN: No acute events or recurrent chest pain. ROS: denies fever, chills, cp, sob, n/v, HA unless stated above.      ampicillin-sulbactam  3 g Intravenous Q6H    aspirin EC  162 mg Oral Daily    atorvastatin  80 mg Oral Daily    latanoprost  1 drop Both Eyes Daily    dorzolamide  1 drop Both Eyes TID    docusate sodium  200 mg Oral Nightly    finasteride  5 mg Oral Daily    metoprolol tartrate  50 mg Oral BID    tamsulosin  0.4 mg Oral Nightly    sodium chloride flush  10 mL Intravenous 2 times per day    enoxaparin  40 mg Subcutaneous Daily    insulin lispro  0-12 Units Subcutaneous TID     insulin lispro  0-6 Units Subcutaneous Nightly    brimonidine  1 drop Both Eyes Q12H    And    timolol  1 drop Both Eyes Q12H       albuterol 2.5 mg Q4H PRN   hydrALAZINE 10 mg Q6H PRN   nitroGLYCERIN 0.4 mg Q5 Min PRN   sodium chloride flush 10 mL PRN   sodium chloride flush 10 mL PRN   magnesium hydroxide 30 mL Daily PRN   ondansetron 4 mg Q6H PRN   acetaminophen 650 mg Q4H PRN   glucose 15 g PRN   dextrose 12.5 g PRN   glucagon (rDNA) 1 mg PRN   dextrose 100 mL/hr PRN        Objective:    BP (!) 147/68   Pulse 99   Temp 98.9 °F (37.2 °C) (Oral)   Resp 18   Ht 5' 11\" (1.803 m)   Wt 165 lb 8 oz (75.1 kg)   SpO2 98%   BMI 23.08 kg/m²   General Appearance: well-developed and well nourished, in no acute distress and alert  Skin: warm and dry, no rash or erythema  Head: normocephalic and atraumatic  Eyes: extraocular eye movements intact and conjunctivae normal  ENT: hearing grossly normal bilaterally  Neck: neck supple and non tender without mass and no thyromegaly   Pulmonary/Chest: Diminished throughout  Cardiovascular: normal rate, normal S1 and S2, no murmurs, no gallops and no JVD  Abdomen: soft, non-tender, non-distended, normal bowel sounds, no masses or organomegaly  Extremities: no cyanosis, no clubbing and no edema      Recent Labs     04/20/19  1134 04/21/19  0505    137   K 4.3 4.4    103   CO2 24 25   BUN 11 10   CREATININE 1.0 1.0   GLUCOSE 110* 94   CALCIUM 8.2* 8.2*       Recent Labs     04/20/19  1134 04/21/19  0505   WBC 4.7 4.6   RBC 2.98* 2.83*   HGB 9.2* 8.9*   HCT 29.4* 27.6*   MCV 98.7 97.5   MCH 30.9 31.4   MCHC 31.3* 32.2   RDW 14.1 14.3   * 126*   MPV 10.6 11.0     Radiology:   XR CHEST STANDARD (2 VW)   Final Result   Somewhat increasing infiltrate and/or atelectasis at the right base. Underlying congestive failure as before. Additional observations as   outlined above. CT HEAD WO CONTRAST   Final Result   Extensive involutional changes remain stable when compared   with last study of June 20, 2018. XR CHEST PORTABLE   Final Result      1. Congestive failure. 2. Persistent pneumonic infiltrate on the right. This may be followed   to complete radiographic resolution. If this fails to resolve,   contrast enhanced CT should be considered to assess for an underlying   neoplasm.             CT ABDOMEN PELVIS WO CONTRAST Additional Contrast? None   Final Result      1. Left lower lobe pneumonia. 2. 2 abdominal aortic aneurysms, the more inferior of which is   minimally increased in size compared to prior exam.   3. Nonobstructing left nephrolithiasis. 4. Cholelithiasis. FL MODIFIED BARIUM SWALLOW W VIDEO    (Results Pending)       Assessment:    Principal Problem:    Community acquired pneumonia of left lower lobe of lung ([address]  Active Problems:    Diabetes mellitus without complication ([address]    Hyperlipidemia    Essential hypertension, benign    CAD (coronary artery disease)    CVA (cerebral vascular accident) ([address]    Fall    Generalized weakness    Thrombocytopenia (HCC)    Essential hypertension    Controlled type 2 diabetes mellitus without complication ([address]    Aspiration pneumonia due to gastric secretions (HCC)    Acute respiratory failure with hypoxia (HCC)    Aspiration pneumonia ([address]  Resolved Problems:    * No resolved hospital problems. *      Plan:  1. Community acquired Pneumonia VS aspiration Pneumonia   - Pulm following, recommend starting Unasyn 3G Q6H  - Pt is NPO, pt to have swallow study, will order speech consult  - Continue 6L NC    2. Recent Fall at home   - PT to eval and treat     3. DM  - Metformin on hold, continue Medium SSI     4. HTN  - stable currently   - Continue Metoprolol and Hydralazine PRN systolic >907    5. CAD with recent episode of chest pain   - Pain around 4:30 PM yesterday, relieved with [facility]  - Cardiology consulted d/t new onset A. Fib  - Will trend troponin     6. Hyperlipidemia   - Continue Atorvastatin     7. H/O CVA  - Continue aspirin, Plavix on hold d/t recent urological procedure     8. Anemia:   - No labs today, will order repeat labs    9. Recent Urological procedure   - Elias in placed, uro prefers to keep in until urine is clear   - Continue Finasteride and Flomax     10.  New onset A. Fib   - Will order CMP and Mg   - Cardiology consulted, tele during visit a fib with PVC rate 95  - Recent echo 9-5-18, will await further cardio recommendations       Electronically signed by Tobias Soto PA-C on 4/22/2019 at 11:32 AM

## 2019-04-22 NOTE — PROGRESS NOTES
Call placed to Dr. Isamar Gambino regarding diet/MBS results-see orders.   Electronically signed by Nacho Douglas RN on 4/22/2019 at 4:24 PM

## 2019-04-22 NOTE — CONSULTS
Full Consult     HPI:  Patient seen in consultation for possible atrial fibrillation. Patient admitted with pneumonia. He does have a history of CABG and is on therapy with Plavix and aspirin. He denies any chest pain or heart fluttering. Last ejection fraction was 55% with moderate mitral regurgitation on an echo cardiogram 6 months ago.       Past Medical History:   Diagnosis Date    AAA (abdominal aortic aneurysm) (HCC)     [facility]) [facility]     Diabetes ([facility]     Glaucoma     High blood sugar     Hypertension         Social History     Socioeconomic History    Marital status:      Spouse name: Sheree Segura    Number of children: 2    Years of education: 15    Highest education level: Not on file   Occupational History    Occupation: Retired   Social Needs    Financial resource strain: Not on file    Food insecurity:     Worry: Not on file     Inability: Not on file   [employer] needs:     Medical: Not on file     Non-medical: Not on file   Tobacco Use    Smoking status: Former Smoker     Packs/day: 1.00     Types: Cigarettes     Last attempt to quit: 1989     Years since quittin.7    Smokeless tobacco: Never Used    Tobacco comment: quit in    Substance and Sexual Activity    Alcohol use: Yes     Comment: occassional glass of  wine    Drug use: No    Sexual activity: Not Currently   Lifestyle    Physical activity:     Days per week: Not on file     Minutes per session: Not on file    Stress: Not on file   Relationships    Social connections:     Talks on phone: Not on file     Gets together: Not on file     Attends Jew service: Not on file     Active member of club or organization: Not on file     Attends meetings of clubs or organizations: Not on file     Relationship status: Not on file    Intimate partner violence:     Fear of current or ex partner: Not on file     Emotionally abused: Not on file     Physically abused: Not on file Forced sexual activity: Not on file   Other Topics Concern    Not on file   Social History Narrative    Not on file        Family History   Problem Relation Age of Onset    Kidney Disease Mother     Cancer Sister     Cirrhosis Brother        ROS:   General: No unusual weight gain, change in exercise tolerance  Skin: No rash or itching  EENT: No vision changes or nosebleeds  Cardiovascular: No orthopnea or paroxysmal nocturnal dyspnea  Respiratory: No cough or hemoptysis  Gastrointestinal: No hematemesis or recent changes in bowel habits  Genitourinary: No hematuria, urgency or frequency  Musculoskeletal: No muscular weakness or joint swelling   Neurologic / Psychiatric: No incoordination or convulsions  Allergic / Immunologic/ Lymphatic / Endocrine: No anemia or bleeding tendency    Physical Exam:   Vitals:    04/21/19 1930 04/21/19 2311 04/22/19 0515 04/22/19 0752   BP: 136/77 138/62  (!) 147/68   Pulse: 100 100  99   Resp: 24 18 18   Temp: 98.3 °F (36.8 °C) 98.5 °F (36.9 °C)  98.9 °F (37.2 °C)   TempSrc: Oral Oral  Oral   SpO2: 97% 97%  98%   Weight:   165 lb 8 oz (75.1 kg)    Height:         HEAD & FACE: Normocephalic. Symmetric. EYES: No xanthelasma. Conjunctivae not injected. EARS, NOSE, MOUTH & THROAT: Good dentition. No oral pallor or cyanosis. NECK: No JVD at 30 degrees. No thyromegaly. RESPIRATORY: Scattered rales. .  No use of accessory muscle or intercostal retractions. CARDIOVASCULAR: Regular rate and rhythm. No lifts or thrills on palpitation. Auscultation with normal S1-S2 in intensity and splitting. Grade 1 to 2/6 holosystolic murmur heard best at the apical area. No carotid bruits. Abdominal aorta not enlarged. Femoral arteries without bruits. Pedal pulses 1+. No edema. ABDOMEN: Soft without hepatic or splenic enlargement. No tenderness. MUSCULOSKELETAL: No kyphosis or scoliosis of the back. Good muscle strength and tone. No muscle atrophy.     EXTREMITIES: No clubbing or cyanosis. SKIN: No Xanthomas or ulcerations. NEUROLOGIC: Oriented to time, place and person. Normal mood and affect. LYMPHATIC:  No palpable neck or supraclavicular nodes. No spleno        EKG: No acute changes    Impression:  1. Supra ventricular ectopy  2. MAT  3. Pneumonia  4. S/p CABG   5. Moderate MR         Plan:  1. Continue to monitor for arrhyhmias  2. No treatment for SVT/MAT at this time   3.  Continue to monitor for CHF       Reymundo Everett

## 2019-04-22 NOTE — PROGRESS NOTES
[facility] Quality Flow/Interdisciplinary Rounds Progress Note        Quality Flow Rounds held on April 22, 2019    Disciplines Attending:  Bedside Nurse, ,  and Nursing Unit [address] was admitted on 4/20/2019 11:07 AM    Anticipated Discharge Date:  Expected Discharge Date: 04/22/19    Disposition:    Gabriel Score:  Gabriel Scale Score: 18    Readmission Risk              Risk of Unplanned Readmission:        18           Discussed patient goal for the day, patient clinical progression, and barriers to discharge. The following Goal(s) of the Day/Commitment(s) have been identified:  Wean IV fluids, transition to PO atb's. Check Pulmonology plan.       Evie Gambino  April 22, 2019

## 2019-04-22 NOTE — CARE COORDINATION
Social work / Discharge planning:       Social work consult noted regarding discharge plan. Social work met with patient, his wife Gerald Aparicio and family at bedside. They have caregivers in the home 4 days per week from [employer]. He uses a cane and walker at home. Patient is currently using 02 which he does not have at home. Current AM PAC 10/24. Social work discussed the option of [facility] and provided a L[facility] choice list.   They requested [facility]na [facility] of the [facility] because Mrs Guilherme Muse was there in the past.   Referral made to liaison. Awaiting response.   Electronically signed by PRASHANT Bond on 4/22/2019 at 2:29 PM

## 2019-04-23 PROBLEM — I65.23 BILATERAL CAROTID ARTERY STENOSIS: Chronic | Status: ACTIVE | Noted: 2019-01-01

## 2019-04-23 PROBLEM — R13.12 OROPHARYNGEAL DYSPHAGIA: Chronic | Status: ACTIVE | Noted: 2019-01-01

## 2019-04-23 PROBLEM — R13.12 OROPHARYNGEAL DYSPHAGIA: Status: ACTIVE | Noted: 2019-01-01

## 2019-04-23 NOTE — PROGRESS NOTES
Occupational Therapy  OT BEDSIDE TREATMENT NOTE      Date:2019  Patient Name: Dee Bailey  MRN: [id number]  : 1932  Room: [address] [place] [address]     Evaluating OT: David Bojorquez OTR/L [id number]     AM-PAC Daily Activity Raw Score:      Recommended Adaptive Equipment: TBD     Diagnosis: pneumonia. Pt presents to ED due to onset of weakness, confusion and fall at home.      Pertinent Medical History: AAA, h/o CVA, DM, glaucoma, HTN   Precautions:  Falls, O2     Home Living: Pt lives with wife in a single story home with 1-2 steps HR on entry. Bathroom setup: tub/shower combo      Prior Level of Function: Mod I with ADLs, Mod I with simple IADLs; completed functional mobility WW in the last few weeks only. Pt's son reports he stops in daily for higher level IADLs but reports both the patient and his wife are independent in their own home. Pt's son reports the patient is typically alert and oriented.      Pain Level: no c/o pain     Cognition: A&O: 2/4. Pt is oriented to self and grossly oriented to place able to report probably a hospital. Disoriented to temporal concepts and situation. Problem solving:  poor              Judgement/safety:  poor                Functional Assessment:    Initial Eval Status  Date: 19 Treatment session: 19 Short Term Goals  Treatment frequency: 2-5x/wk PRN x1-3 wks   Feeding Min A       Grooming Mod A   Min A   UB Dressing Mod A   Min A   LB Dressing Dependent  Donning B socks  Dependent to don socks Mod A    Bathing Max A   Mod A   Toileting Dependent   Mod A   Bed Mobility  Supine to sit: NT seated in chair on entry  Supine <> sit: Max A x2     Functional Transfers STS: Max A  STS: Max A x2 from EOB 2xs Min A   Functional Mobility Max A with HHA  1 small step forward and back to armchair  NT due to poor activity tolerance and weakness. Min A during ADLs   Balance Sitting: poor plus.  Significant L sided lean     Standing: poor  Sitting: Poor displaying posterior and L lateral lean    Standing: Poor. B knees were blocked due to knees buckling. Cues and assistance to prevent L lateral lean were provided. Poor safety awareness     Activity Tolerance Poor plus Poor+ requiring various rest breaks and extended time for tasks standing scott x4-5 min with fair/fair minus balance during self care tasks           Limited use of L UE during tx. UE Ex:  Therapist facilitated AAROM L shoulder flexion, shoulder horizontal abduction, scapular retraction and elbow flexion and extension (1x8 reps) requiring mod vc and demos for correct form. Exercises were performed to improve L UE strength to increase independence with ADLs. Comments: Upon arrival pt was supine in bed and able to be seen for OOB activities per nursing. Therapist observed increased tone in cervical muscles. Instructed pt in neck flexion and UE stretches to decrease tone. Educated family on stretches in addition to pt. At end of session pt was transferred back to bed with alarm on and all lines and tubes intact, call light within reach. Education: Cervical/ UE stretches and safe transfer training to improve safety during future ADLs. · Pt has made fair progress towards set goals.    · Continue with current plan of care      Total Tx Time: [address] ELIAS/L [id number]

## 2019-04-23 NOTE — PROGRESS NOTES
CARDIOLOGY  INPATIENT PROGRESS NOTE       Date:  4/23/2019    Patient: Elie Johnson, 80 y.o., 9/23/1932  Admission:  4/20/2019 11:07 AM,  LOS: 2 days   Admit DX: Community acquired pneumonia of left lower lobe of lung ([address] [J18.1]  Community acquired pneumonia of left lower lobe of lung ([address] [J18.1]  Aspiration pneumonia due to gastric secretions, unspecified laterality, unspecified part of lung ([address] [J69.0]      Chief Complaint:  Patient is being seen in follow up for:  1. Supra ventricular ectopy  2. MAT  3. Pneumonia  4. S/p CABG   5. Moderate MR         SUBJECTIVE:    Yon CARABALLO Zak was seen and examined. Notes and labs reviewed. No CP less SOB. There were not complications over night. ROS:   Cardiac: As per HPI   General: No fever, chills   Pulmonary: As per HPI   HEENT: No diplopia, dysphagia, epistaxis   GI: No nausea, vomiting, abdominal pain, hematochezia or melena  : No dysuria or hematuria   Endocrine: no polydipsia, polyuria  Musculoskeletal: KURTZ x 4, no arthritis   Skin: no rash or pruritis  Neuro/Psych: No headache or focal motor/sensory deficits    OBJECTIVE:        Intake/Output Summary (Last 24 hours) at 4/23/2019 0830  Last data filed at 4/23/2019 0230  Gross per 24 hour   Intake 60 ml   Output 1200 ml   Net -1140 ml       PHYSICAL EXAM: BP (!) 160/74   Pulse 88   Temp 98.7 °F (37.1 °C) (Oral)   Resp 18   Ht 5' 11\" (1.803 m)   Wt 170 lb 8 oz (77.3 kg)   SpO2 97%   BMI 23.78 kg/m²       HEAD & FACE: Normocephalic. Symmetric. EYES: No xanthelasma. Conjunctivae not injected. EARS, NOSE, MOUTH & THROAT: Good dentition. No oral pallor or cyanosis. NECK: No JVD at 30 degrees. No thyromegaly. RESPIRATORY: Scattered rales. .  No use of accessory muscle or intercostal retractions. CARDIOVASCULAR: Regular rate and rhythm. No lifts or thrills on palpitation. Auscultation with normal S1-S2 in intensity and splitting.   Grade 1 to 2/6 holosystolic murmur heard best at the apical area. No carotid bruits. Abdominal aorta not enlarged. Femoral arteries without bruits. Pedal pulses 1+. No edema. ABDOMEN: Soft without hepatic or splenic enlargement. No tenderness. MUSCULOSKELETAL: No kyphosis or scoliosis of the back. Good muscle strength and tone. No muscle atrophy. EXTREMITIES: No clubbing or cyanosis. SKIN: No Xanthomas or ulcerations. NEUROLOGIC: Oriented to time, place and person. Normal mood and affect. LYMPHATIC:  No palpable neck or supraclavicular nodes. No spleno          Current Inpatient Medications:   ampicillin-sulbactam  3 g Intravenous Q6H    aspirin EC  162 mg Oral Daily    atorvastatin  80 mg Oral Daily    latanoprost  1 drop Both Eyes Daily    dorzolamide  1 drop Both Eyes TID    docusate sodium  200 mg Oral Nightly    finasteride  5 mg Oral Daily    metoprolol tartrate  50 mg Oral BID    tamsulosin  0.4 mg Oral Nightly    sodium chloride flush  10 mL Intravenous 2 times per day    enoxaparin  40 mg Subcutaneous Daily    insulin lispro  0-12 Units Subcutaneous TID WC    insulin lispro  0-6 Units Subcutaneous Nightly    brimonidine  1 drop Both Eyes Q12H    And    timolol  1 drop Both Eyes Q12H       IV Infusions (if any):   sodium chloride 50 mL/hr at 04/22/19 1223    dextrose         LABS:    CBC:   Recent Labs     04/21/19  0505 04/22/19  1147   WBC 4.6 9.3   HGB 8.9* 9.7*   HCT 27.6* 30.7*   * 139     BMP:   Recent Labs     04/21/19  0505 04/22/19  1147    141   K 4.4 4.7   CO2 25 25   BUN 10 14   CREATININE 1.0 1.0   LABGLOM >60 >60   GLUCOSE 94 123*     BNP: No results for input(s): BNP in the last 72 hours. PT/INR: No results for input(s): PROTIME, INR in the last 72 hours.   Mag:   Recent Labs     04/21/19  0505 04/23/19  0230   MG 1.6 1.8     Phos:   Recent Labs     04/21/19  0505   PHOS 2.6     TSH:   Recent Labs     04/22/19  1147   TSH 2.000     HgA1c:   Lab Results   Component Value Date    LABA1C 5.3 03/25/2019     No results found for: EAG  Cardiac Enzymes:   Recent Labs     04/20/19  1134 04/21/19  0505 04/22/19  1147   CKTOTAL  --  359*  --    TROPONINI 0.01  --  0.03     Fasting Lipid Panel:   Lab Results   Component Value Date    HDL 29 07/11/2018    LDLCALC 55 07/11/2018    TRIG 119 07/11/2018     Liver Profile:   Recent Labs     04/21/19  0505 04/22/19  1147   AST 36 33   ALT 19 23   LABALBU 2.9* 3.2*       Telemetry: Sinus    ASSESSMENT:   1. Supra ventricular ectopy  2. MAT  3. Pneumonia  4. S/p CABG   5. Moderate MR       PLAN:  1. Continue to monitor for arrhyhmias  2. No treatment for SVT/MAT at this time   3.  Continue to monitor for CHF       Electronically signed by Anatoly White MD on 4/23/2019 at 8:30 AM

## 2019-04-23 NOTE — PROGRESS NOTES
Dr. Clement Garcia notified of patient's MRI results, which show recent stroke. Per Dr. Clement Garcia patient will need to be sent downtown.      Electronically signed by Surekha Pina RN on 4/23/2019 at 4:38 PM

## 2019-04-23 NOTE — PROGRESS NOTES
Patient's family updated of MRI results and that patient is being transferred down town for further evaluation and treatment. Per Unit secretary Azucena Munoz,  time is approximately 40 minutes from now or 6 pm. Family made aware of the above.     Electronically signed by Alex Arce RN on 4/23/2019 at 5:20 PM

## 2019-04-23 NOTE — PROGRESS NOTES
Physical Therapy  Facility/Department: [facility] MED SURG  Daily Treatment Note  NAME: Liss Crespo  : 1932  MRN: [id number]    Date of Service: 2019    Patient Diagnosis(es): The primary encounter diagnosis was Community acquired pneumonia of left lower lobe of lung ([facility] [address] Diagnoses of Generalized weakness and Ambulatory dysfunction were also pertinent to this visit. has a past medical history of AAA (abdominal aortic aneurysm) ([facility] [address] CVA (cerebral vascular accident) ([facility] [address] Diabetes ([facility] [address] Glaucoma, High blood sugar, and Hypertension. has a past surgical history that includes Appendectomy; Coronary artery bypass graft; Cardiac surgery; and Lumbar spine surgery. Evaluating Therapist: Tabitha Mackey PT         Room #: 593   DIAGNOSIS:  PNA , slid out of chair   PRECAUTIONS: falls      Social:  Pt lives with  Wife  in a  1  floor plan  2  steps and  1 rails to enter. Prior to admission pt walked with  [employer] until recent decline over past week per son. Pt sleeps in recliner. Has caregivers 4 x/wk        Initial Evaluation  Date: 19 Treatment      Short Term/ Long Term   Goals   Was pt agreeable to Eval/treatment? yes   yes     Does pt have pain?  none reported   pt reported pain but did not give location     Bed Mobility  Rolling:  NT   Supine to sit:  Max assist   Sit to supine:  Max assist   Scooting: max assist   Rolling: Max A  Supine to sit:  Max assist  x 2  Sit to supine:  Max assist x 2  Scooting: max assist to sitting EOB Min assist    Transfers Sit to stand:  Max assist   Stand to sit: max assist   Stand pivot: max assist   Sit to stand:  Max assist  x 2  Stand to sit: max assist  x 2  Stand pivot: NT Min assist    Ambulation    2  feet with  ww  with max assist   pt unable  10 feet with  ww  with  Min assist    LE ROM  WFL        LE strength  R LE : 4/ 5   L LE : 4-/ 5        AM- PAC RAW score  10/ 24   7/24              Balance: sitting EOB assist varied form Mod A to SBA.   Pt with left lateral lean when sitting. Pt sat EOB 10 minutes working or LE exercises and trunk stability and posture. Standing without device Max A x 2. Pt standing tolerance about 1 minute. Rometta Favre Pt performed therapeutic exercise of the following: ankle pumps, LAQs and hip flexion while sitting EOB. All exercises AAROM. Instructed pt to perform heel slides and quad sets while pt supine in bed. Patient education  Pt was educated on PT objectives during treatment session, LE exercises, upright posture when sitting and standing. Patient response to education:   Pt verbalized understanding Pt demonstrated skill Pt requires further education in this area   Yes    yes     Additional Comments: Pt found in bed with family present. Pt with left lateral and posterior lean when sitting EOB. Sit to stand performed twice. Pt unable to take steps. Left knee blocked while standing. Pt was left in bed with family present and with call light left by patient. Chair/bed alarm: yes    Time in: 1042  Time out: 1103    Pt is making fair progress toward established Physical Therapy goals. Continue with physical therapy current plan of care.     Bayron Chang, PT  License Number: [id number]

## 2019-04-23 NOTE — PROGRESS NOTES
MRI screening completed at this time with patient, patient's daughter, and patient's wife present at the bedside.      Electronically signed by Gordon Bledsoe RN on 4/23/2019 at 11:46 AM

## 2019-04-23 NOTE — PROGRESS NOTES
Spoke with Dr. No Hernandez regarding general surgery consult for peg placement. Updated him that per speech evaluation patient is a dysphagia 1 pureed diet with nectar thick liquids and has been tolerating well, per Dr. No Hernandez okay to d/c general surgery consult at this time and no need to discuss peg placement.    Electronically signed by Tory Verde RN on 4/23/2019 at 9:47 AM

## 2019-04-23 NOTE — PROGRESS NOTES
Christ Ortiz M.D.,[facility]  Mary Alba D.O., F.A.C.OStellaI., Nesha Robins M.D. Gladys Arellano M.D., Gracie Tran M.D. Daily Pulmonary Progress Note    Patient:  Nelly Crespo 80 y.o. male MRN: [id number]     Date of Service: 4/23/2019      Synopsis      We are following patient for hypoxia   \"CC\" cough      Code status:full            Subjective              Patient was seen and examined. Sitting up  in chair, he feels he still have very weak cough . Tolerating modified diet  . Aspiration precautions with thin liquids  . Cough non productive . His daughter  is at bedside. He admits to mild  SOB . He remains on 2 L NC  98% .       Review of Systems:  Constitutional: Denies fever, weight loss, night sweats, and fatigue  Skin: Denies pigmentation, dark lesions, and rashes   HEENT: Denies hearing loss, tinnitus, ear drainage, epistaxis, sore throat, and hoarseness. Cardiovascular: Denies palpitations, chest pain, and chest pressure. Respiratory: + cough,- dyspnea at rest, hemoptysis, apnea, and choking.   Gastrointestinal: Denies nausea, vomiting, poor appetite, diarrhea, heartburn or reflux  Genitourinary: Denies dysuria, frequency, urgency or hematuria  Musculoskeletal: Denies myalgias, muscle weakness, and bone pain, + generalized weakness   24-hour events:  None          Objective   Vitals: BP (!) 160/74   Pulse 88   Temp 98.7 °F (37.1 °C) (Oral)   Resp 18   Ht 5' 11\" (1.803 m)   Wt 170 lb 8 oz (77.3 kg)   SpO2 97%   BMI 23.78 kg/m²     I/O:    Intake/Output Summary (Last 24 hours) at 4/23/2019 1253  Last data filed at 4/23/2019 0230  Gross per 24 hour   Intake 60 ml   Output 1200 ml   Net -1140 ml                        CURRENT MEDS :  Scheduled Meds:   ampicillin-sulbactam  3 g Intravenous Q6H    aspirin EC  162 mg Oral Daily    atorvastatin  80 mg Oral Daily    latanoprost  1 drop Both Eyes Daily    dorzolamide  1 drop Both Eyes TID    docusate sodium  200 mg Oral Nightly    finasteride  5 mg Oral Daily    metoprolol tartrate  50 mg Oral BID    tamsulosin  0.4 mg Oral Nightly    sodium chloride flush  10 mL Intravenous 2 times per day    enoxaparin  40 mg Subcutaneous Daily    insulin lispro  0-12 Units Subcutaneous TID WC    insulin lispro  0-6 Units Subcutaneous Nightly    brimonidine  1 drop Both Eyes Q12H    And    timolol  1 drop Both Eyes Q12H       Physical Exam:  General Appearance: appears comfortable in no acute distress. HEENT: Normocephalic atraumatic without obvious abnormality   Neck: Lips, mucosa, and tongue normal.  Supple, symmetrical, trachea midline, no adenopathy;thyroid:  no enlargement/tenderness/nodules or JVD. Lung:  Breath sounds scattered rhonchi m no wheezing . Respirations   unlabored. Symmetrical expansion. Heart: RRR, normal S1, S2. No MRG  Abdomen: Soft, NT, ND. BS present x 4 quadrants. No bruit or organomegaly. Extremities: Pedal pulses 2+ symmetric b/l. Extremities normal, no cyanosis, clubbing, or edema. Musculokeletal: No joint swelling, no muscle tenderness. ROM normal in all joints of extremities. Neurologic: Mental status: left sided weakness upper and lower     Pertinent/ New Labs and Imaging Studies     Study was remarkable for laryngeal penetration of thin   and nectar consistencies with aspiration of thin. Patient did   demonstrate cough reflex after aspiration. Patient also demonstrated   retention within the vallecula and piriform sinuses. Patient   demonstrated oral and pharyngeal delay. There was also evidence for   reduced laryngeal elevation.       This procedure was performed by Trisha Webb CNP under the   indirect supervision of Trace Mccann MD.       The exam has been dictated and signed by Barb Rich.  Trace Duval MD, Radiologist, have reviewed the images and   report and concur with these findings.                             Labs:  Lab Results   Component thickened liquids at this time family and pt are not pursuing peg tube   3     Wean FiO2 for saturations above 92%  4.   Aspiration precautions  5.   Albuterol as needed  6.   Follow chest x-ray   7.   pep/fluttter incentive spirometry with chest vest BID   8. Left sided weakness MRI of brain today          This plan of care was reviewed in collaboration with Dr. Narda Gonzalez   Electronically signed by LEANN Payne on 4/23/2019 at 12:53 PM    I personally saw, examined, and cared for the patient. Labs, medications, radiographs reviewed.  I agree with history exam and plans detailed in NP note with the following additions:    Left sided weakness today, MRI brain pending  Cough is very weak - he cannot get sputum out  Continue antibiotics  Plan for bronchoscopy tomorrow under MAC with clean out and washing    Electronically signed by Felicita Kennedy MD on 4/23/2019 at 2:24 PM

## 2019-04-23 NOTE — PROGRESS NOTES
[facility] access center called patient will be going to room 8504-A at [facility] in [facility]. Report needs to be called to [phone] and the access will call back when they have a  time with mobile intensive.      Electronically signed by Deann Ewing RN on 4/23/2019 at 5:15 PM

## 2019-04-23 NOTE — PROGRESS NOTES
Dr. Diandra Ocasio notified on perfect serve that patient will be transferring downtown for further evaluation therefore patient will not be here tomorrow to have the bronch which was scheduled by Dr. Diandra Ocasio with pulmonology.      Electronically signed by Sujatha Pozo RN on 4/23/2019 at 5:35 PM

## 2019-04-23 NOTE — PROGRESS NOTES
Mobile intensive here to transport patient down town. Report given to Amaya Patino RN with mobile intensive.    Electronically signed by Erlin Agee RN on 4/23/2019 at 6:06 PM

## 2019-04-23 NOTE — PROGRESS NOTES
[facility] Progress Note    Admitting Date and Time: 4/20/2019 11:07 AM  Admit Dx: Community acquired pneumonia of left lower lobe of lung ([address] [J18.1]  Community acquired pneumonia of left lower lobe of lung ([address] [J18.1]  Aspiration pneumonia due to gastric secretions, unspecified laterality, unspecified part of lung ([address] [J69.0]    Subjective:  Patient is being followed for Community acquired pneumonia of left lower lobe of lung ([address] [J18.1]  Community acquired pneumonia of left lower lobe of lung ([address] [J18.1]  Aspiration pneumonia due to gastric secretions, unspecified laterality, unspecified part of lung ([address] [J69.0]   Pt feels better, still has the dysphagia, worse with liquids, barium swallow showed aspiration and failed swallow oropharyngeal stage. Ptient is weak on the left side, lower and upper extremities, and per him and his family this is much worse than his status when he had the initial stroke in Jun 2018, MRI brain ordered STAT. Found             Impression           1. Areas of recent stroke are seen involving right frontal and   parietal periventricular white matter as well as focal areas of recent   stroke at level of right insular cortex. Clinical correlation   recommended.       2. Severe burden of abnormal signal located in the periventricular and   subcortical white matter suggestive of severe microvascular ischemic   change versus nonspecific encephalopathy.                     Transfer to [facility] was arranged for further neurological evaluation, talked to Dr. Jeni Villaseñor, accepted the patient. ROS: denies fever, chills, cp, sob, n/v, HA unless stated above.      ampicillin-sulbactam  3 g Intravenous Q6H    aspirin EC  162 mg Oral Daily    atorvastatin  80 mg Oral Daily    latanoprost  1 drop Both Eyes Daily    dorzolamide  1 drop Both Eyes TID    docusate sodium  200 mg Oral Nightly    finasteride  5 mg Oral Daily    metoprolol tartrate  50 mg Oral BID    tamsulosin  0.4 mg Oral Nightly    sodium chloride flush  10 mL Intravenous 2 times per day    enoxaparin  40 mg Subcutaneous Daily    insulin lispro  0-12 Units Subcutaneous TID     insulin lispro  0-6 Units Subcutaneous Nightly    brimonidine  1 drop Both Eyes Q12H    And    timolol  1 drop Both Eyes Q12H       albuterol 2.5 mg Q4H PRN   hydrALAZINE 10 mg Q6H PRN   nitroGLYCERIN 0.4 mg Q5 Min PRN   sodium chloride flush 10 mL PRN   sodium chloride flush 10 mL PRN   magnesium hydroxide 30 mL Daily PRN   ondansetron 4 mg Q6H PRN   acetaminophen 650 mg Q4H PRN   glucose 15 g PRN   dextrose 12.5 g PRN   glucagon (rDNA) 1 mg PRN   dextrose 100 mL/hr PRN        Objective:    BP (!) 160/74   Pulse 88   Temp 98.7 °F (37.1 °C) (Oral)   Resp 18   Ht 5' 11\" (1.803 m)   Wt 170 lb 8 oz (77.3 kg)   SpO2 97%   BMI 23.78 kg/m²   General Appearance: alert and oriented to person, place and time and in no acute distress  Skin: warm and dry  Head: normocephalic and atraumatic  Eyes: pupils equal, round, and reactive to light, extraocular eye movements intact, conjunctivae normal  Neck: neck supple and non tender without mass   Pulmonary/Chest: + bibasilar crackles more on the left, no wheezes, rales or rhonchi, normal air movement, no respiratory distress  Cardiovascular: normal rate, normal S1 and S2 and no carotid bruits  Abdomen: soft, non-tender, non-distended, normal bowel sounds, no masses or organomegaly  Extremities: no cyanosis, no clubbing and no edema  Neurologic: Hx of lacunar stroke in Jun 2018, on my exam there is left side weakness in upper and lower extremities (as per family and patient this has been going on since about 1 month)    Recent Labs     04/20/19  1134 04/21/19  0505 04/22/19  1147    137 141   K 4.3 4.4 4.7    103 105   CO2 24 25 25   BUN 11 10 14   CREATININE 1.0 1.0 1.0   GLUCOSE 110* 94 123*   CALCIUM 8.2* 8.2* 8.2*     Recent Labs     04/20/19  1134 04/21/19  0507 04/22/19  1147   WBC 4.7 4.6 9.3   RBC 2.98* 2.83* 3.10*   HGB 9.2* 8.9* 9.7*   HCT 29.4* 27.6* 30.7*   MCV 98.7 97.5 99.0   MCH 30.9 31.4 31.3   MCHC 31.3* 32.2 31.6*   RDW 14.1 14.3 14.5   * 126* 139   MPV 10.6 11.0 10.3       Radiology: Barium swallow   Impression:     Study was remarkable for laryngeal penetration of thin  and nectar consistencies with aspiration of thin. Patient did  demonstrate cough reflex after aspiration. Patient also demonstrated  retention within the vallecula and piriform sinuses. Patient  demonstrated oral and pharyngeal delay. There was also evidence for  reduced laryngeal elevation. Assessment:    Principal Problem:    Community acquired pneumonia of left lower lobe of lung ([address]  Active Problems:    Oropharyngeal dysphagia    Diabetes mellitus without complication (HCC)    Hyperlipidemia    Essential hypertension, benign    CAD (coronary artery disease)    CVA (cerebral vascular accident) ([address]    Fall    Generalized weakness    Thrombocytopenia ([address]    Essential hypertension    Controlled type 2 diabetes mellitus without complication ([address]    Aspiration pneumonia due to gastric secretions (HCC)    Acute respiratory failure with hypoxia (HCC)    Aspiration pneumonia (HCC)    Multifocal atrial tachycardia (HCC)    SVT (supraventricular tachycardia) ([address]  Resolved Problems:    * No resolved hospital problems.  *    on my exam there is left side weakness in upper and lower extremities (as per family and patient this has been going on since about 1 month)  likely pt had another stroke at certain time before presentation     Plan:  · No growth on cultures   · C/w unasyn   STAT MRI brain     Transfer to [facility]    Electronically signed by Dena Franklin MD on 4/23/2019 at 9:19 AM

## 2019-04-23 NOTE — ANESTHESIA PRE PROCEDURE
Department of Anesthesiology  Preprocedure Note       Name:  Aida Colvin   Age:  80 y.o.  :  1932                                          MRN:  [id number]         Date:  2019      Surgeon: Clay Hubbard):  Karla Marie MD    Procedure: BRONCHOSCOPY WITH ANESTHESIA B.A.L. (N/A )    Medications prior to admission:   Prior to Admission medications    Medication Sig Start Date End Date Taking? Authorizing Provider   metoprolol tartrate (LOPRESSOR) 50 MG tablet TAKE 1 TABLET BY MOUTH TWICE DAILY 19  Yes Deven Erp, DO   metFORMIN (GLUCOPHAGE) 500 MG tablet TAKE 1 TABLET BY MOUTH TWICE DAILY WITH MEALS 19  Yes Versie Erp, DO   carboxymethylcellulose (THERATEARS) 1 % ophthalmic gel Place 1 drop into both eyes 2 times daily    Yes Historical Provider, MD   ramipril (ALTACE) 2.5 MG capsule Take 1 capsule by mouth daily 18  Yes Vick Goddard MD   atorvastatin (LIPITOR) 80 MG tablet Take 1 tablet by mouth daily 10/15/18  Yes Deven Zamora, DO   Omega-3 Fatty Acids (FISH OIL) 1200 MG CAPS Take 1,200 mg by mouth daily   Yes Historical Provider, MD   docusate sodium (COLACE) 100 MG capsule Take 200 mg by mouth nightly   Yes Historical Provider, MD   finasteride (PROSCAR) 5 MG tablet TK 1 T PO D 16  Yes Historical Provider, MD   Specialty Vitamins Products (ICAPS LUTEIN-ZEAXANTHIN) TBCR Take by mouth   Yes Historical Provider, MD   aspirin EC 81 MG EC tablet Take 162 mg by mouth daily    Yes Historical Provider, MD   tamsulosin (FLOMAX) 0.4 MG capsule Take 0.4 mg by mouth nightly    Yes Historical Provider, MD   brinzolamide (AZOPT) 1 % ophthalmic suspension 1 drop 2 times daily    Yes Historical Provider, MD   bimatoprost (LUMIGAN) 0.01 % SOLN ophthalmic drops Place 1 drop into both eyes nightly. Yes Historical Provider, MD   brimonidine-timolol (COMBIGAN) 0.2-0.5 % ophthalmic solution Place 1 drop into both eyes every 12 hours.    Yes Historical Provider, MD   clopidogrel (PLAVIX) 75 MG tablet Take 1 tablet by mouth daily 11/21/18 3/25/19  Ashley Channel, DO   nitroGLYCERIN (NITROSTAT) 0.4 MG SL tablet Place 0.4 mg under the tongue    Historical Provider, MD       Current medications:    Current Facility-Administered Medications   Medication Dose Route Frequency Provider Last Rate Last Dose    ampicillin-sulbactam (UNASYN) 3 g ivpb minibag  3 g Intravenous Q6H Clifton Leger MD   Stopped at 04/23/19 1210    0.9 % sodium chloride infusion   Intravenous Continuous Vasyl Uribe, APRN - CNP 50 mL/hr at 04/23/19 1306      albuterol (PROVENTIL) nebulizer solution 2.5 mg  2.5 mg Nebulization Q4H PRN Vasyl Uribe, APRN - CNP        hydrALAZINE (APRESOLINE) injection 10 mg  10 mg Intravenous Q6H PRN Vasyl Uribe, APRN - CNP   10 mg at 04/21/19 1544    nitroGLYCERIN (NITROSTAT) SL tablet 0.4 mg  0.4 mg Sublingual Q5 Min PRN Isai Gaspar, DO   0.4 mg at 04/21/19 1630    sodium chloride flush 0.9 % injection 10 mL  10 mL Intravenous PRN Leandro Campolock, DO   10 mL at 04/20/19 1146    aspirin EC tablet 162 mg  162 mg Oral Daily Vasyl Uribe, APRN - CNP   162 mg at 04/23/19 0853    atorvastatin (LIPITOR) tablet 80 mg  80 mg Oral Daily Vasyl Uribe, APRN - CNP   80 mg at 04/23/19 0854    latanoprost (XALATAN) 0.005 % ophthalmic solution 1 drop  1 drop Both Eyes Daily Vasyl Uribe, APRN - CNP   1 drop at 04/23/19 0855    dorzolamide (TRUSOPT) 2 % ophthalmic solution 1 drop  1 drop Both Eyes TID Vasyl Uribe, APRN - CNP   1 drop at 04/23/19 1305    docusate sodium (COLACE) capsule 200 mg  200 mg Oral Nightly Vasyl Uribe, APRN - CNP   200 mg at 04/22/19 2114    finasteride (PROSCAR) tablet 5 mg  5 mg Oral Daily Vasyl Uribe, APRN - CNP   5 mg at 04/23/19 0854    metoprolol tartrate (LOPRESSOR) tablet 50 mg  50 mg Oral BID LEANN Ghosh CNP   50 mg at 04/23/19 0864    tamsulosin (FLOMAX) capsule 0.4 mg  0.4 mg Oral Nightly LEANN Ghosh CNP   0.4 mg at 04/22/19 9473  Acute CVA (cerebrovascular accident) [facility] I63.9    Acute cerebrovascular accident (CVA) ([facility] I63.9    CVA (cerebral vascular accident) ([facility] I63.9    Community acquired pneumonia of left lower lobe of lung ([facility] J18.1    Fall W19. Lauryn Peels    Generalized weakness R53.1    Thrombocytopenia ([facility]) D69.6    Essential hypertension I10    Controlled type 2 diabetes mellitus without complication ([facility]) J70.4    Aspiration pneumonia due to gastric secretions (HCC) J69.0    Acute respiratory failure with hypoxia ([facility]) J96.01    Aspiration pneumonia ([facility]) J69.0    Multifocal atrial tachycardia ([facility]) I47.1    SVT (supraventricular tachycardia) ([facility]) I47.1    Oropharyngeal dysphagia R13.12       Past Medical History:        Diagnosis Date    AAA (abdominal aortic aneurysm) ([facility])     CVA (cerebral vascular accident) [facility]     [facility]     Glaucoma     High blood sugar     Hypertension        Past Surgical History:        Procedure Laterality Date    APPENDECTOMY      CARDIAC SURGERY      CORONARY ARTERY BYPASS GRAFT      LUMBAR SPINE SURGERY         Social History:    Social History     Tobacco Use    Smoking status: Former Smoker     Packs/day: 1.00     Types: Cigarettes     Last attempt to quit: 1989     Years since quittin.7    Smokeless tobacco: Never Used    Tobacco comment: quit in    Substance Use Topics    Alcohol use: Yes     Comment: occassional glass of  wine                                Counseling given: No  Comment: quit in       Vital Signs (Current):   Vitals:    19 1536 19 2100 19 0014 19 0733   BP: (!) 148/70 (!) 155/72  (!) 160/74   Pulse: 95 98  88   Resp:   18   Temp: 36.6 °C (97.8 °F) 37.1 °C (98.8 °F)  37.1 °C (98.7 °F)   TempSrc: Oral Oral  Oral   SpO2: 97% 96%  97%   Weight:   170 lb 8 oz (77.3 kg)    Height:                                                  BP Readings from Last 3 Encounters:   19 (!) 160/74   19 (!) 116/58   03/25/19 (!) 100/58       NPO Status:                                                                                 BMI:   Wt Readings from Last 3 Encounters:   04/23/19 170 lb 8 oz (77.3 kg)   04/01/19 168 lb (76.2 kg)   03/25/19 170 lb (77.1 kg)     Body mass index is 23.78 kg/m². CBC:   Lab Results   Component Value Date    WBC 9.3 04/22/2019    RBC 3.10 04/22/2019    HGB 9.7 04/22/2019    HCT 30.7 04/22/2019    MCV 99.0 04/22/2019    RDW 14.5 04/22/2019     04/22/2019       CMP:   Lab Results   Component Value Date     04/22/2019    K 4.7 04/22/2019    K 4.4 04/21/2019     04/22/2019    CO2 25 04/22/2019    BUN 14 04/22/2019    CREATININE 1.0 04/22/2019    GFRAA >60 04/22/2019    LABGLOM >60 04/22/2019    GLUCOSE 123 04/22/2019    PROT 6.4 04/22/2019    CALCIUM 8.2 04/22/2019    BILITOT 0.5 04/22/2019    ALKPHOS 182 04/22/2019    AST 33 04/22/2019    ALT 23 04/22/2019       POC Tests: No results for input(s): POCGLU, POCNA, POCK, POCCL, POCBUN, POCHEMO, POCHCT in the last 72 hours. Coags: No results found for: PROTIME, INR, APTT    HCG (If Applicable): No results found for: PREGTESTUR, PREGSERUM, HCG, HCGQUANT     ABGs: No results found for: PHART, PO2ART, PUB6RQO, XAH8LAO, BEART, F0FQHCVC     Type & Screen (If Applicable):  No results found for: LABABO, LABRH     EKG 4/21/19  Atrial fibrillation with premature ventricular or aberrantly conducted complexes  Right bundle branch block  Left anterior fascicular block  Bifascicular block    Ct Abdomen Pelvis Wo Contrast Additional Contrast 4/20/2019  FINDINGS: Evaluation of the solid organs and vasculature are limited without the use of intravenous contrast. Evaluation of the gastrointestinal tract is limited without the use of oral contrast. THORACIC BASE: Patchy consolidation is seen within the left lung base. Calcified pleural plaques are seen within the left lung base. LIVER: Unremarkable.  BILIARY: Multiple subcentimeter child/children whom consented to blood products. Plan discussed with attending and CRNA.                   Darío Harrell RN   4/23/2019      Patient to be re-evaluated by [facility] Anesthesiologist      Millie Golden MD

## 2019-04-23 NOTE — CONSULTS
[address] [address]                                  CONSULTATION    PATIENT NAME: Abel Bloom                     :        1932  MED REC NO:   [id number]                            ROOM:       8882  ACCOUNT NO:   [de-identified]                           ADMIT DATE: 2019  PROVIDER:     LEANN Lawrence     CONSULT DATE:  2019    CONSULTING PROVIDER:  Ligia Jackson MD.    REASON FOR CONSULTATION:  New onset of atrial fibrillation. HISTORY OF PRESENT ILLNESS:  The patient is an 14-year-old   male who is known to Dr. Neville Ruvalcaba, most recently seen in the office  with Dr. Neville Ruvalcaba on 2018. His medical history includes  coronary artery disease, status post CABG and multiple PCIs, most recent  in ; diabetes mellitus; hypertension; hyperlipidemia; abdominal  aortic aneurysm x2; CVA. The patient presented to [facility] Emergency Department on  2019 with complaints of weakness. He states that prior to  presenting to the Emergency Department, he was \"getting over pneumonia,\"  for which he states he had a nonproductive cough, \"I was too weak to  cough anything up. \"  Also complaining of orthopnea and PND. Denies  dyspnea on exertion and accompanying chest pain. He states that he was  ambulating at his home prior to presentation, \"I fell down, I lost my  balance. \"  He denies hitting his head or loss of consciousness. Of  note, approximately one week ago, he did have a TURP (was doing well per  the patient's daughter, however, then declined due to weakness). Upon  arrival to the Emergency Department, his vital signs were oral  temperature 98.7, heart rate 96 beats per minute, respiratory rate 18,  blood pressure 165/70, oxygen saturation 95% on room air. A 12-lead EKG  was obtained that showed sinus tachycardia with frequent PACs (as  interpreted by Dr. Michael Pereira). Rapid influenza was negative. WBC is 4.7. H and H are 9.2 and 29.4. BUN/sCr 11/1. Urinalysis with trace  leukocyte esterase, negative nitrites. CT of the abdomen and pelvis  showed a left lower lobe pneumonia with two abdominal aortic aneurysms,  one measuring 4.4 x 4.8 cm, the other 5.7 x 5.1 cm and cholelithiasis. Chest x-ray with developing pneumonia. He received doxycycline and IV  Rocephin. He was admitted to a telemetry monitored unit for further  evaluation and management and telemetry monitoring reportedly showed  \"atrial fibrillation. \"  Subsequently, Cardiology was consulted by Dr. Jen Kilpatrick for further evaluation of \"new onset of atrial fibrillation. \"   However, upon further review of the telemetry monitoring by Dr. Shan Zavala,  he was noted to have sinus rhythm/sinus tachycardia with MAT with  frequent PACs. PAST MEDICAL/SURGICAL HISTORY:  1. Reported CABG, 1990, at [facility] (full report requested,  currently unavailable). 2.  History of multiple PCIs. 3.  Most recent cardiac catheterization [facility]),  07/23/2012:  Patent SVG - OM1, SVG - OM2 with 70% restenosis of distal  SVG, SVG - distal RCA with about 95% stenosis at distal stent margin of  proximal graft, LIMA - LAD:  multiple 50% to 70% stenoses distal to  anastomosis, status post PCI of proximal SVG to distal RCA with 3.5 x 15  mm drug-eluting stent. 4.  Most recent echocardiogram with bubble study, 09/05/2018:  EF 55%. No wall motion abnormalities. Stage I diastolic dysfunction. Mild TR. Moderate MR. Mild pulmonary hypertension. No PFO or ASD. 5.  Abdominal aortic aneurysm x2. CT of the abdomen and pelvis,  04/20/2019, with abdominal aortic aneurysm measuring 4.4 x 4.8 cm and  another 5.7 x 5.1 cm.  6.  Hypertension. 7.  Hyperlipidemia. 8.  Diabetes mellitus. 9.  CVA. 10.  Status post appendectomy, lumbar spine surgery (further details  unknown).     FAMILY HISTORY:  Please note this information was not obtained at this  time as it is limited in nature due to the patient's advanced age. SOCIAL HISTORY:  Quit smoking cigarettes in 1989. Prior to that, smoked  one pack per day for greater than 40 years. Denies illicit drug use and  alcohol intake. Caffeine intake includes 1 to 2 cups of coffee daily. ALLERGIES:  No known allergies. HOME MEDICATIONS:  Aspirin enteric-coated 162 mg daily, Lipitor 80 mg  daily, Lumigan, Combigan, omega-3, Altace 2.5 mg daily,  Theratears products 75 mg daily, Coreg, Proscar, metformin, Lopressor 50  mg twice daily, sublingual nitroglycerin as needed, Flomax, ICaps. HOSPITAL MEDICATIONS:  Unasyn, aspirin enteric-coated 162 mg daily,  Lipitor 80 mg daily,  normal saline at 50 mg per hour, Colace,  Trusopt, Lovenox 20 mg daily, Proscar, Humalog, Xalatan, Lopressor 50 mg  twice daily, and Flomax. REVIEW OF SYSTEMS:  See HPI. PHYSICAL EXAMINATION:  VITAL SIGNS:  Oral temperature 98.9, respiratory rate 18, blood pressure  147/68, oxygen saturation 99% on room air, weight 165 pounds, BMI 23.1. Rest of the physical exam to follow as per Dr. Rom Agee. LABS/TESTS:  Thyroid function test pending. CT of the abdomen and  pelvis, see full report. Left lower lobe pneumonia; two abdominal  aortic aneurysms, 4.4 x 4.8 cm was 4.6 x 4.9 cm, another 5.7 x 5.1 cm  was 5.6 x 4.9 cm; and cholelithiasis. Chest x-ray with developing  pneumonia. WBC is 9.3, hemoglobin 9.7, hematocrit 30.7, platelets  730,017. Sodium 141, potassium 4.7, chloride 105, CO2 of 25, BUN 14,  creatinine 1, blood glucose 123, calcium 8.2. GFR greater than 60. Troponin 0.01, 0.03. ProBNP 1658. . Albumin 3.2. IMPRESSION AND PLAN:  To follow as per Dr. Rom Agee.         LEANN Lowe     D: 04/22/2019 21:57:03       T: 04/23/2019 2:46:20     [email or web address]  Job#: [id number]     Doc#: [id number]    CC:      Full Consult      HPI:  Patient seen in consultation for possible atrial fibrillation. Patient admitted with pneumonia. He does have a history of CABG and is on therapy with Plavix and aspirin. He denies any chest pain or heart fluttering. Last ejection fraction was 55% with moderate mitral regurgitation on an echo cardiogram 6 months ago.          Past Medical History  Past Medical History:  Diagnosis Date   AAA (abdominal aortic aneurysm) (HCC)     [facility]     Diabetes ([facility]     Glaucoma     High blood sugar     Hypertension              Social History         Socioeconomic History   Marital status:       Spouse name: Robby Torres   Number of children: 2   Years of education: 15   Highest education level: Not on file  Occupational History   Occupation: Retired  Social Needs   Financial resource strain: Not on file   Food insecurity:      Worry: Not on file      Inability: Not on file  [employer] needs:      Medical: Not on file      Non-medical: Not on file  Tobacco Use   Smoking status: Former Smoker      Packs/day: 1.00      Types: Cigarettes      Last attempt to quit: 1989      Years since quittin.7   Smokeless tobacco: Never Used   Tobacco comment: quit in   Substance and Sexual Activity   Alcohol use:  Yes      Comment: occassional glass of  wine   Drug use: No   Sexual activity: Not Currently  Lifestyle   Physical activity:      Days per week: Not on file      Minutes per session: Not on file   Stress: Not on file  Relationships   Social connections:      Talks on phone: Not on file      Gets together: Not on file      Attends Christianity service: Not on file      Active member of club or organization: Not on file      Attends meetings of clubs or organizations: Not on file      Relationship status: Not on file   Intimate partner violence:      Fear of current or ex partner: Not on file      Emotionally abused: Not on file      Physically abused: Not on file      Forced sexual activity: Not on file  Other Topics Concern   Not on file  Social History Narrative   Not on file            Family History  Family History  Problem Relation Age of Onset   Kidney Disease Mother     Cancer Sister     Cirrhosis Brother            ROS:   General: No unusual weight gain, change in exercise tolerance  Skin: No rash or itching  EENT: No vision changes or nosebleeds  Cardiovascular: No orthopnea or paroxysmal nocturnal dyspnea  Respiratory: No cough or hemoptysis  Gastrointestinal: No hematemesis or recent changes in bowel habits  Genitourinary: No hematuria, urgency or frequency  Musculoskeletal: No muscular weakness or joint swelling   Neurologic / Psychiatric: No incoordination or convulsions  Allergic / Immunologic/ Lymphatic / Endocrine: No anemia or bleeding tendency     Physical Exam:     Vitals  Vitals:    04/21/19 1930 04/21/19 2311 04/22/19 0515 04/22/19 0752  BP: 136/77 138/62   (!) 147/68  Pulse: 100 100   99  Resp: 24 18 18  Temp: 98.3 °F (36.8 °C) 98.5 °F (36.9 °C)   98.9 °F (37.2 °C)  TempSrc: Oral Oral   Oral  SpO2: 97% 97%   98%  Weight:     165 lb 8 oz (75.1 kg)    Height:               HEAD & FACE: Normocephalic. Symmetric. EYES: No xanthelasma. Conjunctivae not injected. EARS, NOSE, MOUTH & THROAT: Good dentition. No oral pallor or cyanosis. NECK: No JVD at 30 degrees. No thyromegaly. RESPIRATORY: Scattered rales. .  No use of accessory muscle or intercostal retractions. CARDIOVASCULAR: Regular rate and rhythm. No lifts or thrills on palpitation. Auscultation with normal S1-S2 in intensity and splitting. Grade 1 to 2/6 holosystolic murmur heard best at the apical area. No carotid bruits. Abdominal aorta not enlarged. Femoral arteries without bruits. Pedal pulses 1+. No edema. ABDOMEN: Soft without hepatic or splenic enlargement. No tenderness. MUSCULOSKELETAL: No kyphosis or scoliosis of the back. Good muscle strength and tone. No muscle atrophy.     EXTREMITIES: No clubbing or cyanosis. SKIN: No Xanthomas or ulcerations. NEUROLOGIC: Oriented to time, place and person. Normal mood and affect. LYMPHATIC:  No palpable neck or supraclavicular nodes. No spleno           EKG: No acute changes     Impression:  Supra ventricular ectopy  MAT  Pneumonia  S/p CABG   Moderate MR            Plan:  Continue to monitor for arrhyhmias  No treatment for SVT/MAT at this time   Continue to monitor for CHF         Jennifer Dacosta  The above documentation has been prepared under my direction and personally reviewed by me in its entirety. I discussed patient case in detail with DAVID and reviewed DAVID's documentation including HPI, ROS, medical/surgical history, family history, social history, allergies, and medications. I confirm that HPI, Physical Exam, Assessment and Plan are performed in their entirety by me.    Jennifer Dacosta MD

## 2019-04-23 NOTE — PROGRESS NOTES
Report given to Florina on [address] at [address] [place] [address] on [employer].      Electronically signed by Ana Rice RN on 4/23/2019 at 5:28 PM

## 2019-04-24 NOTE — PLAN OF CARE
Problem: Falls - Risk of:  Goal: Will remain free from falls  Description  Will remain free from falls  4/24/2019 0121 by Flavia Resendiz RN  Outcome: Met This Shift  4/23/2019 2203 by Yessica Terrazas RN  Outcome: Met This Shift  Goal: Absence of physical injury  Description  Absence of physical injury  4/24/2019 0121 by Flavia Resendiz RN  Outcome: Met This Shift  4/23/2019 2203 by Yessica Terrazas RN  Outcome: Met This Shift     Problem: HEMODYNAMIC STATUS  Goal: Patient has stable vital signs and fluid balance  4/23/2019 2203 by Yessica Terrazas RN  Outcome: Met This Shift     Problem: COMMUNICATION IMPAIRMENT  Goal: Ability to express needs and understand communication  4/24/2019 0121 by Flavia Resendiz RN  Outcome: Met This Shift  4/23/2019 2203 by Yessica Terrazas RN  Outcome: Met This Shift     Problem: Risk for Impaired Skin Integrity  Goal: Tissue integrity - skin and mucous membranes  Description  Structural intactness and normal physiological function of skin and  mucous membranes.   4/24/2019 0121 by Flavia Resendiz RN  Outcome: Not Met This Shift  4/23/2019 2203 by Yessica Terrazas RN  Outcome: Met This Shift     Problem: ACTIVITY INTOLERANCE/IMPAIRED MOBILITY  Goal: Mobility/activity is maintained at optimum level for patient  4/24/2019 0121 by Flavia Resendiz RN  Outcome: Not Met This Shift  4/23/2019 2203 by Yessica Terrazas RN  Outcome: Met This Shift     Problem: Breathing Pattern - Ineffective:  Goal: Ability to achieve and maintain a regular respiratory rate will improve  Description  Ability to achieve and maintain a regular respiratory rate will improve  Outcome: Not Met This Shift

## 2019-04-24 NOTE — PROGRESS NOTES
Visual/  Perceptual Glasses: no  Perception         LUE Minimal to partial  AROM in shoulder, elbow, wrist and fingers  Increase LUE AROM/function to use as an assist during all ADLs and functional activities      Hand dominance: R  UE ROM: RUE:  WFL  LUE: minimal shoulder flex against gravity, partial elbow flex/ext, wrist flex/ext, finger flex/ext  Strength: RUE: grossly 4/5 LUE: NT   Strength: R WFL L poor  Fine Motor Coordination: R WFL   L poor    Hearing: WFL  Sensation:  No c/o numbness or tingling  Tone: impaired L Side  Edema: None noted                            Comments/Treatment: Upon arrival, patient sitting EOB with PT. Miguel Vides Therapist educated and facilitated pt on techniques to increase safety and independence with bed mobility, Postural control while seated EOB, basic grooming and bathing,  functional transfers, and functional mobility (1-2 side steps). Sitting EOB x 15 minutes to increase dynamic sitting balance and activity tolerance. Verbal cues for posture and weight shifting during sitting and standing activity. Pt  education on Adaptive technique/equipment to improve independence  and increase LUE function during UE ADLs. Skilled monitoring of HR, O2 sats and pts response to treatment. 95% SpO2  Pt demonstrating good- understanding of education/techniques,  requiring additional training / education to improve functional independence. At end of session, patient in bed with call light and phone within reach, all lines and tubes intact. Pt would benefit from continued OT to increase functional independence and quality of life. Eval Complexity:   · mod Complexity  · History: Expanded review of medical records and additional review of physical, cognitive, or psychosocial history related to current functional performance  · Exam: 3+ performance deficits  · Assistance/Modification: Mod/max assistance or modifications required to perform tasks.  May have comorbidities that affect occupational performance. Assessment of current deficits   Functional mobility ? ADLs ? Strength ? Cognition x  Functional transfers  ? IADLs ? Safety Awareness ? Endurance ? Fine Motor Coordination x Balance ? Vision/perception x Sensation ? Gross Motor Coordination x ROM x Delirium ? Motor Control x    Plan of Care:   ADL retraining ? Equipment needs ? Neuromuscular re-education ? Energy Conservation Techniques ? Functional Transfer training ? Patient and/or Family Education ? Functional Mobility training ? Environmental Modifications ? Cognitive re-training x   Compensatory techniques for ADLs ? Splinting Needs ? Positioning to improve overall function ? Therapeutic Activity ? Therapeutic Exercise  ? Visual/Perceptual: ?    Delirium prevention/treatment  ? Other:  ?    Rehab Potential: Good for established goals    Patient / Family Goal: get more rehab     Patient and/or family were instructed diagnosis, prognosis/goals and plan of care. Demonstrated good- understanding. ? Malnutrition indicators have been identified and nursing has been notified to ensure a dietitian consult is ordered.      mod Evaluation + 30 timed treatment minutes  Treatment Time in: 8:25  Treatment Time out: 8:55      Erich Zuñiga, OTR/L #[id number]

## 2019-04-24 NOTE — CONSULTS
aneurysm) ([facility]     [facility]     Diabetes ([facility]     Glaucoma     High blood sugar     Hypertension         Past Surgical History:   Procedure Laterality Date    APPENDECTOMY      CARDIAC SURGERY      CORONARY ARTERY BYPASS GRAFT      LUMBAR SPINE SURGERY         Current Medications:    sodium chloride 50 mL/hr at 04/24/19 1122    dextrose        acetaminophen, magnesium hydroxide, ondansetron, sodium chloride flush, albuterol, dextrose, dextrose, glucagon (rDNA), glucose, hydrALAZINE, nitroGLYCERIN, sodium chloride flush    [START ON 4/25/2019] aspirin EC  81 mg Oral Daily    clopidogrel  75 mg Oral Daily    ipratropium-albuterol  1 ampule Inhalation Q8H    enoxaparin  40 mg Subcutaneous Daily    sodium chloride flush  10 mL Intravenous 2 times per day    ampicillin-sulbactam  3 g Intravenous Q6H    atorvastatin  80 mg Oral Nightly    brimonidine  1 drop Both Eyes Q12H    And    timolol  1 drop Both Eyes Q12H    docusate sodium  200 mg Oral Nightly    dorzolamide  1 drop Both Eyes TID    finasteride  5 mg Oral Daily    insulin lispro  0-12 Units Subcutaneous TID WC    insulin lispro  0-6 Units Subcutaneous Nightly    latanoprost  1 drop Both Eyes Daily    metoprolol tartrate  50 mg Oral BID    tamsulosin  0.4 mg Oral Nightly        Allergies:  Patient has no known allergies.     Social History     Socioeconomic History    Marital status:      Spouse name: iMlla Quach Number of children: 2    Years of education: 15    Highest education level: Not on file   Occupational History    Occupation: Retired   Social Needs    Financial resource strain: Not on file    Food insecurity:     Worry: Not on file     Inability: Not on file   Ori[employer] needs:     Medical: Not on file     Non-medical: Not on file   Tobacco Use    Smoking status: Former Smoker     Packs/day: 1.00     Types: Cigarettes     Last attempt to quit: 8/4/1989     Years since quittin.7    Smokeless tobacco: Never Used    Tobacco comment: quit in    Substance and Sexual Activity    Alcohol use: Yes     Comment: occassional glass of  wine    Drug use: No    Sexual activity: Not Currently   Lifestyle    Physical activity:     Days per week: Not on file     Minutes per session: Not on file    Stress: Not on file   Relationships    Social connections:     Talks on phone: Not on file     Gets together: Not on file     Attends Yarsani service: Not on file     Active member of club or organization: Not on file     Attends meetings of clubs or organizations: Not on file     Relationship status: Not on file    Intimate partner violence:     Fear of current or ex partner: Not on file     Emotionally abused: Not on file     Physically abused: Not on file     Forced sexual activity: Not on file   Other Topics Concern    Not on file   Social History Narrative    Not on file        Family History   Problem Relation Age of Onset    Kidney Disease Mother     Cancer Sister     Cirrhosis Brother        REVIEW OF SYSTEMS:      Eyes:      Blurred vision:  No [x]/Yes []               Diplopia:   No [x]/Yes []               Vision loss:       No [x]/Yes []   Ears, nose, throat:             Hearing loss:    No [x]/Yes []      Vertigo:   No [x]/Yes []                       Swallowing problem:  No [x]/Yes []               Nose bleeds:   No [x]/Yes []      Voice hoarseness:  No [x]/Yes []  Respiratory:             Cough:   No [x]/Yes []      Pleuritic chest pain:  No [x]/Yes []                        Dyspnea:   No [x]/Yes []      Wheezing:   No [x]/Yes []  Cardiovascular:             Angina:   No [x]/Yes []      Palpitations:   No [x]/Yes []          Claudication:    No [x]/Yes []      Leg swelling:   No [x]/Yes []  Gastrointestinal:             Nausea or vomiting:  No [x]/Yes []               Abdominal pain:  No [x]/Yes []                     Intestinal bleeding: No [x]/Yes []  Musculoskeletal:             Leg pain:   No [x]/Yes []      Back pain:   No [x]/Yes []                    Weakness:   No []/Yes [x]  Neurologic:             Numbness:   No [x]/Yes []      Paralysis:   No [x]/Yes []                       Headaches:   No [x]/Yes []  Hematologic, lymphatic:   Anemia:   No [x]/Yes []              Bleeding or bruising:  No [x]/Yes []              Fevers or chills: No [x]/Yes []  Endocrine:             Temp intolerance:   No [x]/Yes []                       Polydipsia, polyuria:  No [x]/Yes []  Skin:              Rash:    No [x]/Yes []      Ulcers:   No [x]/Yes []              Abnorm pigment: No [x]/Yes []  :              Frequency/urgency:  No [x]/Yes []      Hematuria:    No [x]/Yes []                      Incontinence:    No [x]/Yes []    PHYSICAL EXAM:  Vitals:    04/24/19 1145   BP: 138/80   Pulse: 92   Resp: 18   Temp: 98 °F (36.7 °C)   SpO2: 96%     General Appearance: alert and oriented to person, place and time, well developed and well- nourished, in no acute distress  Skin: warm and dry, no rash or erythema  Head: normocephalic and atraumatic, left facial droop  Eyes: extraocular eye movements intact, conjunctivae normal  ENT: external ear and ear canal normal bilaterally, nose without deformity  Pulmonary/Chest: normal air movement, no respiratory distress  Cardiovascular: normal rate, regular rhythm  Abdomen: soft, non-tender, non-distended, no masses or organomegaly  Musculoskeletal: normal range of motion, no joint deformity or tenderness, left arm and leg weakness compared to right.   Neurologic: no cranial nerve deficit, speech normal  Extremities: no leg edema bilaterally    PULSE EXAM      Right      Left   Brachial     Radial     Femoral     Popliteal     Dorsalis Pedis     Posterior Tibial     (3=normal, 2=diminished, 1=barely palpable, 4=widened)      LABS:    Lab Results   Component Value Date    WBC 9.3 04/22/2019    HGB 9.7 (L) 04/22/2019    HCT 30.7 (L) 04/22/2019     04/22/2019    K 4.7 04/22/2019    BUN 14 04/22/2019    CREATININE 1.0 04/22/2019       RADIOLOGY:    US and CTA reviewed.

## 2019-04-24 NOTE — CARE COORDINATION
Patient's son Gautam Haney asked SW about the possibility of patient transitioning to the ARU at [facility] when medically stable for rehab. Spoke with Barby Benitez, liaison with ARU and explained family request for her to review the chart for appropriateness at this time. Chart and therapy notes reviewed with Barby Benitez and at this time, the patient is not appropriate for the AR program at [facility] as he will likely not be appropriate to safely discharge to home in the time frame allotted for an acute rehab stay. The patient may be appropriate to return to the ARU after some therapy at a [facility] as appropriate. Went to patient's room to speak with patient and family re: above. Patient sleeping and no other family present at this time. Call placed to Gautam Haney, [phone], no answer and VM left requesting return call to discuss transition of care planning. Await return call.      Matilda Renteria.

## 2019-04-24 NOTE — H&P
[address]                  [address] [facility],  [facility]                              HISTORY AND PHYSICAL    PATIENT NAME: Sujit CARABALLO                     :        1932  MED REC NO:   [id number]                            ROOM:       [facility]  ACCOUNT NO:   [de-identified]                           ADMIT DATE: 2019  PROVIDER:     Romeo Morales DO    CHIEF COMPLAINT:  Left-sided weakness. HISTORY OF PRESENT ILLNESS:  The patient is an 71-year-old   male who was transferred from [address] [place] [address] to [address] [place] [address] for  neurologic evaluation. He was originally admitted at [address] [place] [address]. He was found to have acute CVA and transferred. PAST MEDICAL HISTORY:  Coronary artery disease, carotid stenosis, aortic  aneurysm, BPH, hypertension, elevated cholesterol, NIDDM, glaucoma. PAST SURGICAL HISTORY:  CABG, appendectomy, bilateral eye cataract  surgery. SOCIAL HISTORY:  The patient quit tobacco in . Occasional wine. MEDICATIONS PRIOR TO ADMISSION:  Lopressor, metformin, Plavix, Altace,  Lipitor, fish oil, Colace, Proscar, nitroglycerin sublingual p.r.n.,  Icaps, vitamins, aspirin, Flomax, Azopt eyedrops, Lumigan eyedrops,  Combigan eyedrops. PRIMARY CARE PHYSICIAN:  Orlando Goyal DO    PHYSICAL EXAMINATION:  GENERAL APPEARANCE:  Reveals an 71-year-old  male who is alert,  cooperative, and a fair historian. VITAL SIGNS:  On admission, temperature 99 degrees; pulse 86;  respirations 18; blood pressure 176/73, repeat 140/62. HEENT:  Head:  Normocephalic, atraumatic. Eyes:  Pupils equal and  reactive to light. Extraocular muscles intact. Fundi not well  visualized. Nose:  No obstruction, polyp, or discharge noted. Mouth:   Mucosa without lesion. Pharynx:  Noninjected without exudate. NECK:  Supple. No JVD. No thyromegaly. No carotid bruits.   HEART:  Regular rate and rhythm with grade 1/6 systolic murmur. LUNGS:  Clear to auscultation bilaterally. ABDOMEN:  Positive bowel sounds, soft, nontender. No rebound or  guarding. No hepatosplenomegaly. No masses. BACK:  With increased thoracic kyphosis. EXTREMITIES:  There is decreased strength in the left upper and lower  extremity. LYMPH NODES:  No adenopathy noted. SKIN:  Without rash or lesion. IMPRESSION:  Acute CVA; carotid stenosis; hypertension; hyperlipidemia;  NIDDM; BPH; glaucoma; coronary artery disease, status post coronary  artery bypass graft. PLAN:  Admit. Neurology and Vascular to see. Cardiac telemetry. PT,  OT eval.   for discharge planning. ADDENDUM:  The patient with history of possible aspiration pneumonia, on  antibiotics at [facility] and there was plans for a bronchoscopy  per Pulmonary on 04/24/2019 which was not performed due to the patient's  transfer for acute CVA. We will ask Pulmonary to see for further  evaluation and treatment.         Hortensia Nevarez DO    D: 04/24/2019 15:02:26       T: 04/24/2019 15:03:53     [email or web address]  Job#: [id number]     Doc#: [id number]    CC:

## 2019-04-24 NOTE — PROGRESS NOTES
James Mane M.D.,[facility]  Ruben Ibarra D.O., FLARRYOOSIEL., Lina Magallanes M.D. Bhavin Drake M.D., James Parada M.D. Daily Pulmonary Progress Note    Patient:  Manuel Crespo 80 y.o. male MRN: [id number]     Date of Service: 4/24/2019      Synopsis      We are following patient for hypoxia   \"CC\" cough      Code status:full            Subjective              Patient was seen and examined today lying in the bed in no apparent distress. He is on 3L. He has some difficulty with expressive aphasia s/p CVA. Daughter at bedside. He denies cough and says he has a little intermittent shortness of breath. He is for bronch with clean out today.       Review of Systems:  Constitutional: Denies fever, weight loss, night sweats, and fatigue  Skin: Denies pigmentation, dark lesions, and rashes   HEENT: Denies hearing loss, tinnitus, ear drainage, epistaxis, sore throat, and hoarseness. Cardiovascular: Denies palpitations, chest pain, and chest pressure. Respiratory: - cough,- dyspnea at rest, hemoptysis, apnea, and choking.   Gastrointestinal: Denies nausea, vomiting, poor appetite, diarrhea, heartburn or reflux  Genitourinary: Denies dysuria, frequency, urgency or hematuria  Musculoskeletal: Denies myalgias, muscle weakness, and bone pain, + generalized weakness   24-hour events:  None          Objective   Vitals: BP (!) 142/78   Pulse 90   Temp 97.6 °F (36.4 °C) (Temporal)   Resp 16   Ht 5' 11\" (1.803 m)   Wt 164 lb 4 oz (74.5 kg)   SpO2 94%   BMI 22.91 kg/m²     I/O:    Intake/Output Summary (Last 24 hours) at 4/24/2019 0917  Last data filed at 4/24/2019 0647  Gross per 24 hour   Intake 2011 ml   Output 650 ml   Net 1361 ml          CURRENT MEDS :  Scheduled Meds:   enoxaparin  40 mg Subcutaneous Daily    sodium chloride flush  10 mL Intravenous 2 times per day    ampicillin-sulbactam  3 g Intravenous Q6H    aspirin EC  162 mg Oral Daily    atorvastatin  80 mg Oral Nightly    brimonidine  1 drop Both Eyes Q12H    And    timolol  1 drop Both Eyes Q12H    docusate sodium  200 mg Oral Nightly    dorzolamide  1 drop Both Eyes TID    finasteride  5 mg Oral Daily    insulin lispro  0-12 Units Subcutaneous TID WC    insulin lispro  0-6 Units Subcutaneous Nightly    latanoprost  1 drop Both Eyes Daily    metoprolol tartrate  50 mg Oral BID    tamsulosin  0.4 mg Oral Nightly       Physical Exam:  General Appearance: appears comfortable in no acute distress. HEENT: Normocephalic atraumatic without obvious abnormality   Neck: Lips, mucosa, and tongue normal.  Supple, symmetrical, trachea midline, no adenopathy;thyroid:  no enlargement/tenderness/nodules or JVD. Lung:  Breath sounds basilar wheezing, diminished in the bases. Respirations   unlabored. Symmetrical expansion. Heart: RRR, normal S1, S2. No MRG  Abdomen: Soft, NT, ND. BS present x 4 quadrants. No bruit or organomegaly. Extremities: Pedal pulses 2+ symmetric b/l. Extremities normal, no cyanosis, clubbing, or edema. Musculokeletal: No joint swelling, no muscle tenderness. ROM normal in all joints of extremities. Neurologic: Mental status: left sided weakness upper and lower     Pertinent/ New Labs and Imaging Studies     Study was remarkable for laryngeal penetration of thin   and nectar consistencies with aspiration of thin. Patient did   demonstrate cough reflex after aspiration. Patient also demonstrated   retention within the vallecula and piriform sinuses. Patient   demonstrated oral and pharyngeal delay. There was also evidence for   reduced laryngeal elevation.       This procedure was performed by rTisha Fraire CNP under the   indirect supervision of Geovanna Martinez MD.       The exam has been dictated and signed by Chapo Santos. Fidencio Pike, Geovanna Martinez MD, Radiologist, have reviewed the images and   report and concur with these findings.                     MRI brain  1.  Areas of recent stroke are seen involving right frontal and   parietal periventricular white matter as well as focal areas of recent   stroke at level of right insular cortex. Clinical correlation   recommended.       2. Severe burden of abnormal signal located in the periventricular and   subcortical white matter suggestive of severe microvascular ischemic   change versus nonspecific encephalopathy.         Labs:  Lab Results   Component Value Date    WBC 9.3 04/22/2019    HGB 9.7 04/22/2019    HCT 30.7 04/22/2019    MCV 99.0 04/22/2019    MCH 31.3 04/22/2019    MCHC 31.6 04/22/2019    RDW 14.5 04/22/2019     04/22/2019    MPV 10.3 04/22/2019     Lab Results   Component Value Date     04/22/2019    K 4.7 04/22/2019    K 4.4 04/21/2019     04/22/2019    CO2 25 04/22/2019    BUN 14 04/22/2019    CREATININE 1.0 04/22/2019    LABALBU 3.2 04/22/2019    CALCIUM 8.2 04/22/2019    GFRAA >60 04/22/2019    LABGLOM >60 04/22/2019     No results found for: PROTIME, INR  No results for input(s): PROBNP in the last 72 hours. No results for input(s): PROCAL in the last 72 hours. This SmartLink has not been configured with any valid records. Micro:  No results for input(s): CULTRESP in the last 72 hours. No results for input(s): LABGRAM in the last 72 hours. No results for input(s): LEGUR in the last 72 hours. Recent Labs     04/22/19  1359   STREPNEUMAGU Presumptive NEGATIVE for Pneumococcal pneumonia, suggesting  no current or recent pneumococcal infection. Infection due  to S. pneumoniae cannot be ruled out since the antigen present  in the sample may be below the detection limit of the test.       Recent Labs     04/22/19  1339   LP1UAG Presumptive NEGATIVE suggesting no recent or current infections  with Legionella pneumophilia serogroup 1.  Infection to  Legionella cannot be ruled out since other serogroups and  species may cause infection, antigen may not be present in  early infection, or level of antigen may be below the  detection limit. Assessment:    1. Acute hypoxic respiratory failure most likely secondary to aspiration pneumonia  2.  Commonly acquired pneumonia /Aspiration pneumonia   3.  Dysphagia  4.  Failure to thrive  5.  History of stroke with new left sided weakness   6. Weak cough       Plan:     1.    continue  Unasyn 3 g every 6 hours  2.    video swallow aspiration thin liquids , modified diet thickened liquids at this time family and pt are not pursuing peg tube   3     Wean FiO2 for saturations above 92%  4.    Aspiration precautions  5.    Albuterol as needed  6.    Follow chest x-ray   7.    pep/fluttter incentive spirometry with chest vest BID   8. Left sided weakness MRI of brain: see above  9. For possible bronch with clean out and washing, will evaluate need today       This plan of care was reviewed in collaboration with Dr. Marshall Steinberg  Electronically signed by LEANN Marques CNP on 4/24/2019 at 9:16 AM      I personally saw, examined, and cared for the patient. Labs, medications, radiographs reviewed. I agree with history exam and plans detailed in NP note. Respiratory status has improved with chest pt, supplemental O2 requirements have gone down. Will hold off on bronchoscopy. Add ippb with duoneb. Serial cxr   dw nursing staff, patient and daughter at the bedside. Clayton Hernandez M.D.    Pulmonary/Critical Care Medicine

## 2019-04-24 NOTE — PROGRESS NOTES
Occupational Therapy    Pt may benefit from acute rehab. PADMA Slater was notified of recommendations for a PM& R consult.    Irean Cranker, OTR/L

## 2019-04-24 NOTE — CARE COORDINATION
SW spoke with patients son, who is at bedside. We discussed transition of care at length. He states therapy did talk to him regarding possible acute rehab, but he is uncertain at this time if he wants that plan. SW did answer questions regarding acute. A PM and R consult is needed for them to eval.  Son states if not acute he would like [facility] tabulate. Have called Carlotta at [facility] she is following for [place] and definative plan as needed.

## 2019-04-24 NOTE — PROGRESS NOTES
Physical Therapy    Facility/Department: [facility] NEURO [facility]  Initial Assessment    NAME: Vick Crespo  : 1932  MRN: [id number]    Date of Service: 2019    Evaluating Therapist: Jones Agee PT, DPHENRI    Room #: 1231/8719-J  DIAGNOSIS: Acute CVA  PRECAUTIONS: Falls, R frontal and parietal lobe infarcts causing L hemiparesis, O2  PMHx: AAA, Diabetes, HTN  PROCEDURES: NA    Social:  Pt lives with wife, who is unable to assist, in a 1 floor plan with 3 step(s) and 1 rail(s) to enter. Prior to admission pt walked with [employer] most recently due to weakness. Pt usually ambulates with SPC. Initial Evaluation  Date: 19 Treatment  Date:     Short Term/ Long Term   Goals   AM-PAC 6 Clicks      Does pt have pain? No c/o pain     Bed Mobility  Rolling: NT  Supine to sit: MaxA  Sit to supine: MaxA  Scooting: MaxA  Rodriguez   Transfers Sit to stand: MaxA x 2  Stand to sit: MaxA x 2  Stand pivot: NT  Rodriguez with AAD   Ambulation   2 side steps to [facility] with MaxA x 2 with no device  >50 feet with Rodriguez with AAD   Stair negotiation: ascended and descended NT  >2 steps with 1 rail with Rodriguez   BLE ROM WNL     BLE strength RLE Grossly 4+/5  L hip 3/5  L knee 2+/5  L ankle 2/5  Increase by 1/3 MMT grade   Balance Sitting: Rodriguez  Standing: MaxA x 2  Sitting: SBA  Standing: Rodriguez with AAD     Pt is alert and oriented x 4  Sensation: WNL  Edema: WNL    ASSESSMENT  Pt displays functional ability as noted in the objective portion of this evaluation. Comments/Treatment:  Pt was supine in bed upon arrival, agreeable to initial evaluation. Pt's daughter present for session. Pt presented with L hemiparesis and distal weakness greater than proximal.  Pt required increased assistance for all mobility but was able to initiate LLE movement. Pt reported mild dizziness upon sitting EOB with a L lateral lean. Pt tolerated sitting for >10 minutes.   Pt completed LLE therapeutic exercises: L ankle DF/PF x 5 reps ea, L LAQs x 8 reps, L hip flexion x 8 reps. In standing, pt demonstrated flexed posture and required VCs and assistance to reach an erect posture. Pt required assistance for weight shifting and advancing LLE. Pt fatigued quickly and requested to sit. Pt returned to supine in bed and left in care of OT. Discharge options explained to pt and pt's daughter. Pt would benefit from an intensive rehabilitation program at discharge. Patient education  Pt educated on safety    Patient response to education:   Pt verbalized understanding Pt demonstrated skill Pt requires further education in this area   x x x       Pts/ family goals   1. Return home    Patient and or family understand(s) diagnosis, prognosis, and plan of care. PLAN  PT care will be provided in accordance with the objectives noted above. Whenever appropriate, clear delegation orders will be provided for nursing staff. Exercises and functional mobility practice will be used as well as appropriate assistive devices or modalities to obtain goals. Patient and family education will also be administered as needed. Frequency of treatments will be 2-5x/week as able.     Time in: 0815  Time out: Emery Garvin, PT, DPT  [id number]

## 2019-04-24 NOTE — PROGRESS NOTES
Physical Therapy    Facility/Department: [address] NEURO IMCU    NAME: Dewey Crespo  : 1932  MRN: [id number]    Date of Service: 2019  Pt may benefit from acute rehab at discharge and is in need of a PM&R consult.     Fernando Watts, PT, DPT  [id number]

## 2019-04-24 NOTE — PROGRESS NOTES
Consult sent to Dr Crispin Leon via perfect serve  Consult sent to Dr Missy Miranda via perfect serve  Consult sent to Dr Ruth Pennington via perfect serve

## 2019-04-24 NOTE — CONSULTS
Martine Gold is a 80 y.o. right handed male    Neurology was consulted for CVA    Past Medical History:     Past Medical History:   Diagnosis Date    [facility]     [facility]     Diabetes [facility]     Glaucoma     High blood sugar     Hypertension        Past Surgical History:     Past Surgical History:   Procedure Laterality Date    APPENDECTOMY      CARDIAC SURGERY      CORONARY ARTERY BYPASS GRAFT      LUMBAR SPINE SURGERY         Allergies:     Patient has no known allergies. Medications:     Prior to Admission medications    Medication Sig Start Date End Date Taking?  Authorizing Provider   metoprolol tartrate (LOPRESSOR) 50 MG tablet TAKE 1 TABLET BY MOUTH TWICE DAILY 4/1/19   Melania Oneill DO   metFORMIN (GLUCOPHAGE) 500 MG tablet TAKE 1 TABLET BY MOUTH TWICE DAILY WITH MEALS 1/28/19   Melania Oneill DO   carboxymethylcellulose (THERATEARS) 1 % ophthalmic gel Place 1 drop into both eyes 2 times daily     Historical Provider, MD   clopidogrel (PLAVIX) 75 MG tablet Take 1 tablet by mouth daily 11/21/18 3/25/19  Esperanza Guzmán DO   ramipril (ALTACE) 2.5 MG capsule Take 1 capsule by mouth daily 11/6/18   Heidi Perez MD   atorvastatin (LIPITOR) 80 MG tablet Take 1 tablet by mouth daily 10/15/18   Melania Oneill DO   Omega-3 Fatty Acids (FISH OIL) 1200 MG CAPS Take 1,200 mg by mouth daily    Historical Provider, MD   docusate sodium (COLACE) 100 MG capsule Take 200 mg by mouth nightly    Historical Provider, MD   finasteride (PROSCAR) 5 MG tablet TK 1 T PO D 11/16/16   Historical Provider, MD   nitroGLYCERIN (NITROSTAT) 0.4 MG SL tablet Place 0.4 mg under the tongue    Historical Provider, MD   Specialty Vitamins Products (ICAPS LUTEIN-ZEAXANTHIN) TBCR Take by mouth    Historical Provider, MD   aspirin EC 81 MG EC tablet Take 162 mg by mouth daily     Historical Provider, MD   tamsulosin (FLOMAX) 0.4 MG capsule Take 0.4 mg by mouth nightly Historical Provider, MD   brinzolamide (AZOPT) 1 % ophthalmic suspension 1 drop 2 times daily     Historical Provider, MD   bimatoprost (LUMIGAN) 0.01 % SOLN ophthalmic drops Place 1 drop into both eyes nightly. Historical Provider, MD   brimonidine-timolol (COMBIGAN) 0.2-0.5 % ophthalmic solution Place 1 drop into both eyes every 12 hours. Historical Provider, MD       Social History:     Denies ETOH, tobacco, or illicit drugs    Review of Systems:     No chest pain or palpitations  No SOB  No vertigo, lightheadedness or loss of consciousness  No falls, tripping or stumbling  No incontinence of bowels or bladder  No itching or bruising appreciated  No numbness, tingling or focal arm/leg weakness    ROS is otherwise negative     Family History:     Family History   Problem Relation Age of Onset    Kidney Disease Mother     Cancer Sister     Cirrhosis Brother         History of Present Illness: Luther Brown is an 80 y.o male with as PMH  punctate R frontal lobe CVA in June 2018 who presented on 4/20 with bilateral lower extremity weakness, generalized fatigue and recent PNA dx. He was admitted and during hospitalization he was noted to be hypoxic at 78% with concerns for aspiration PNA and started on unasyn. He was found to have dysphagia and left sided weakness. MRI obtained and resulted in acute right frontal and parietal stroke. He was transferred to [facility] for neurology evaluation. Today he states he continues to be weak on the left side with facial droop. He continues to have difficulty swallowing. Objective:     BP (!) 142/78   Pulse 90   Temp 97.6 °F (36.4 °C) (Temporal)   Resp 16   Ht 5' 11\" (1.803 m)   Wt 164 lb 4 oz (74.5 kg)   SpO2 94%   BMI 22.91 kg/m²     General appearance: alert, appears stated age, cooperative and no distress  Head: normocephalic, without obvious abnormality, atraumatic  Eyes: conjunctivae/corneas clear.  .  Neck: no adenopathy, no carotid bruit, supple, symmetrical, trachea midline and thyroid not enlarged, symmetric, no tenderness/mass/nodules  Lungs: clear to auscultation bilaterally  Heart: regular rate and rhythm, S1, S2 normal, no murmur, click, rub or gallop  Extremities: normal, atraumatic, no cyanosis or edema  Pulses: 2+ and symmetric  Skin: color, texture, turgor normal---no rashes or lesions    Mental Status: alert, oriented, thought content appropriate    Appropriate attention/concentration  Intact fundus of knowledge  Memories intact    Speech: no dysarthria  Language: no aphasias    Cranial Nerves:  I: smell    II: visual acuity     II: visual fields Full    II: pupils YASMEEN   III,VII: ptosis None   III,IV,VI: extraocular muscles  EOMI without nystagmus    V: mastication Normal   V: facial light touch sensation  Normal   V,VII: corneal reflex  Present   VII: facial muscle function - upper  Normal   VII: facial muscle function - lower abnormal   VIII: hearing Normal   IX: soft palate elevation  Normal   IX,X: gag reflex Present   XI: trapezius strength  5/5   XI: sternocleidomastoid strength 5/5   XI: neck extension strength  5/5   XII: tongue strength  Normal     Motor:  Right   5/5              Left   3/5               Right Bicep  5/5           Left Bicep  3/5              Right Triceps   5/5       Left Triceps  3/5          Right Deltoid  5/5     Left Deltoid  3/5           Right Gastrocnemius    5/5    Left Gastrocnemius   3/5  Right Ant Tibialis   5/5  Left Ant Tibialis   3/5   5/5 throughout  Normal bulk and tone   No drift  No abnormal movements    Sensory:  LT decreased on left      Coordination:   FN normal    Gait:  Not assessed    DTR:   Right Brachioradialis reflex 2+  Left Brachioradialis reflex 2+  Right Biceps reflex 2+  Left Biceps reflex 1+  Right Achilles reflex 2+  Left Achilles reflex 1+    No Babinskis  No Thomas's     No other pathological reflexes    Laboratory/Radiology:     CBC with Differential:    Lab Results

## 2019-04-25 NOTE — CARE COORDINATION
Spoke with Tonia Obrien at \[facility]\"". She states patient is accepted and they will have a bed for patient tomorrow. No precert needed. HENS, envelope and ambulance form on soft chart.

## 2019-04-25 NOTE — PLAN OF CARE
Problem: Falls - Risk of:  Goal: Will remain free from falls  Description  Will remain free from falls  Outcome: Met This Shift  Goal: Absence of physical injury  Description  Absence of physical injury  Outcome: Met This Shift     Problem: HEMODYNAMIC STATUS  Goal: Patient has stable vital signs and fluid balance  Outcome: Met This Shift     Problem: COMMUNICATION IMPAIRMENT  Goal: Ability to express needs and understand communication  Outcome: Met This Shift

## 2019-04-25 NOTE — PROGRESS NOTES
100 mL/hr, Intravenous, PRN, Paoliafia Romero, DO    dextrose 50 % solution 12.5 g, 12.5 g, Intravenous, PRN, [facility]    docusate sodium (COLACE) capsule 200 mg, 200 mg, Oral, Nightly, [facility], 200 mg at 04/24/19 2016    dorzolamide (TRUSOPT) 2 % ophthalmic solution 1 drop, 1 drop, Both Eyes, TID, Paoli Clementina Romero, DO, 1 drop at 04/25/19 0841    finasteride (PROSCAR) tablet 5 mg, 5 mg, Oral, Daily, Paoli Clementina Romero, DO, 5 mg at 04/25/19 0840    glucagon (rDNA) injection 1 mg, 1 mg, Intramuscular, PRN, Paoli Clementina Romero,     glucose (GLUTOSE) 40 % oral gel 15 g, 15 g, Oral, PRN, [facility]    hydrALAZINE (APRESOLINE) injection 10 mg, 10 mg, Intravenous, Q6H PRN, Paoli Clementina Romero DO    insulin lispro (HUMALOG) injection vial 0-12 Units, 0-12 Units, Subcutaneous, TID WC, Paoli Clementina Romero, DO, 4 Units at 04/25/19 1139    insulin lispro (HUMALOG) injection vial 0-6 Units, 0-6 Units, Subcutaneous, Nightly, [facility]    latanoprost (XALATAN) 0.005 % ophthalmic solution 1 drop, 1 drop, Both Eyes, Daily, Paoli Clementina Romero, DO, 1 drop at 04/25/19 0841    metoprolol tartrate (LOPRESSOR) tablet 50 mg, 50 mg, Oral, BID, Paoliafia Romero, DO, 50 mg at 04/25/19 0841    nitroGLYCERIN (NITROSTAT) SL tablet 0.4 mg, 0.4 mg, Sublingual, Q5 Min PRN, Paoli Clementina Romero DO    sodium chloride flush 0.9 % injection 10 mL, 10 mL, Intravenous, PRN, Paoli Clementina Romero, DO    tamsulosin [facility]) capsule 0.4 mg, 0.4 mg, Oral, Nightly, Paoli Clementina Romero, DO, 0.4 mg at 04/24/19 2018    Objective:    BP (!) 148/74   Pulse 86   Temp 98 °F (36.7 °C) (Temporal)   Resp 18   Ht 5' 11\" (1.803 m)   Wt 164 lb 9 oz (74.6 kg)   SpO2 96%   BMI 22.95 kg/m²     Heart:  Reg  Lungs:  CTA bilaterally, no wheeze, rales or rhonchi  Abd: bowel sounds present, nontender, nondistended, no masses  Extrem:  l sided weakness    CBC with Differential:    Lab Results   Component Value Date    WBC 9.3 04/22/2019    RBC 3.10 04/22/2019    HGB 9.7 04/22/2019    HCT 30.7 04/22/2019     04/22/2019    MCV 99.0 04/22/2019    MCH 31.3 04/22/2019    MCHC 31.6 04/22/2019    RDW 14.5 04/22/2019    NRBC 0.9 06/20/2018    LYMPHOPCT 6.5 04/22/2019    MONOPCT 5.6 04/22/2019    BASOPCT 0.2 04/22/2019    MONOSABS 0.52 04/22/2019    LYMPHSABS 0.60 04/22/2019    EOSABS 0.02 04/22/2019    BASOSABS 0.02 04/22/2019     CMP:    Lab Results   Component Value Date     04/22/2019    K 4.7 04/22/2019    K 4.4 04/21/2019     04/22/2019    CO2 25 04/22/2019    BUN 14 04/22/2019    CREATININE 1.0 04/22/2019    GFRAA >60 04/22/2019    LABGLOM >60 04/22/2019    GLUCOSE 123 04/22/2019    PROT 6.4 04/22/2019    LABALBU 3.2 04/22/2019    CALCIUM 8.2 04/22/2019    BILITOT 0.5 04/22/2019    ALKPHOS 182 04/22/2019    AST 33 04/22/2019    ALT 23 04/22/2019     Warfarin PT/INR:  No results found for: INR, PROTIME    Assessment:    Principal Problem:    Acute cerebrovascular accident (CVA) ([facility] [address]  Active Problems:    Hyperlipidemia    CAD (coronary artery disease)    Community acquired pneumonia of left lower lobe of lung ([facility] [address]    Essential hypertension    Acute respiratory failure with hypoxia (HCC)    Aspiration pneumonia (HCC)    Multifocal atrial tachycardia (HCC)    Oropharyngeal dysphagia    Bilateral carotid artery stenosis    Lethargy  Resolved Problems:    * No resolved hospital problems.  *      Plan:  Dc planning to snf when ok with neurology resume ramipril        Juan C Colon  1:37 PM  4/25/2019

## 2019-04-25 NOTE — PROGRESS NOTES
Occupational Therapy  OT BEDSIDE TREATMENT NOTE      Date:2019  Patient Name: Poli Melendez  MRN: [id number]  : 1932  Room: [address] [place] [address]A     Evaluating OT: Tab Flores OTR/L #[id number]     AM-PAC Daily Activity Raw Score:      Recommended Adaptive Equipment: To be further assessed  On rehab     Diagnosis: acute CVA     Pertinent Medical History: DM, HTN, Glaucoma, CVA     Precautions:  Falls, O2     Home Living: Pt lives with wife in a 1 story with 1-2 step(s) to enter and 1 rail(s); bed/bath on 1st   Bathroom setup: tub shower combo  Equipment owned: ww  Prior Level of Function: Modified [place]  with ADLs , Modified [place]  with IADLs; using ww as of recent for ambulation.      Pain Level: Pt reports heel pain, no numeric value given. Cognition: A&O: 3/4; Follows 2 step directions              Memory:  fair               Sequencing:  fair               Problem solving:  poor              Judgement/safety:  poor                Functional Assessment:    Initial Eval Status  Date: 19 Treatment Status  Date: 19 Short Term Goals  Treatment frequency: 3-5x/week   Feeding Moderate Assist  Mod A  Stand by Assist    Grooming Maximal Assist  Min A   To comb hair while seated EOB.  Minimal Assist    UB Dressing Maximal Assist  Mod A  To don/doff gown while seated EOB.  Minimal Assist    LB Dressing Dependent  Dependent  To don/doff socks while seated EOB.  Moderate Assist    Bathing Maximal Assist Max A  Per eval  Moderate Assist    Toileting Dependent  Dependent  Per eval  Moderate Assist    Bed Mobility  Supine to sit: NT pt was sitting EOB with PT upon start of OT eval   Sit to supine: Maximal Assist x 2 Max A- supine<->sit  Educated on technique to increase independence.  Supine to sit: Moderate Assist   Sit to supine:  Moderate Assist    Functional Transfers Sit to stand: Maximal Assist x 2  Stand to sit: Maximal Assist x 2  Stand pivot: NT  Max A x 2- sit<->stand  Per last tx  Moderate Assist    Functional Mobility Max A x2   To take 1-2 side steps N/T  Mod A  For improved transfers to [facility]   Balance Sitting:     Static:  Min A    Dynamic:mod A  Standing: mod/max A x 2 Sitting:     Static:  Min A    Dynamic:mod A  Standing: N/T  Increase sitting balance to SBA for EOB ADLs  Increase standing balance/tolerance to mod A for improved indep with LE dressing   Activity Tolerance Poor+  Pt is very motivated  Poor+  Pt fatigued quickly   good   Visual/  Perceptual Glasses: no  Perception           LUE Minimal to partial  AROM in shoulder, elbow, wrist and fingers  Pt educated on and completed RUE AROM exercises all planes to complete throughout day to increase strength and endurance for increased independence with ADL's. Pt completed LUE AAROM to increase strength and maintain joint mobility. Increase LUE AROM/function to use as an assist during all ADLs and functional activities         Comments: Upon arrival pt supine in bed. Pt educated on techniques to increase independence and safety during ADL's, bed mobility, and functional transfers. Core strengthening exercises completed EOB to increase sitting balance. At end of session pt left supine in bed, call light within reach. · Pt has made fair progress towards set goals.      · Continue with current plan of care    Time in: 2:55  Time out: 3:35  Total Tx Time: 40 mins    Ramin Cantrell

## 2019-04-25 NOTE — CARE COORDINATION
Patients son is now wanting [facility]. Referral made to [facility] at [address] [place] [address]. Await acceptance.

## 2019-04-25 NOTE — PROGRESS NOTES
Larisa Salvador M.D.,[facility]  Donnie OsullivanSt. James Hospital and ClinicRAMON, FBREANNA.O.I., Sanchez Dawkins M.D. Lindsay Marmolejo M.D., Gaby Ham M.D. Daily Pulmonary Progress Note    Patient:  Angeline Crespo 80 y.o. male MRN: [id number]     Date of Service: 4/25/2019      Synopsis      We are following patient for hypoxia   \"CC\" cough      Code status:full            Subjective              Patient was seen and examined today lying in the bed in no apparent distress. He is on 2L. He has some difficulty with expressive aphasia s/p CVA. Son at bedside. He has a weak cough and difficulty with secretions, continue with chest vest therapy. Bronch was cancelled d/t acute stroke.      Review of Systems:  Constitutional: Denies fever, weight loss, night sweats, and fatigue  Skin: Denies pigmentation, dark lesions, and rashes   HEENT: Denies hearing loss, tinnitus, ear drainage, epistaxis, sore throat, and hoarseness. Cardiovascular: Denies palpitations, chest pain, and chest pressure. Respiratory: + cough,- dyspnea at rest, hemoptysis, apnea, and choking.   Gastrointestinal: Denies nausea, vomiting, poor appetite, diarrhea, heartburn or reflux  Genitourinary: Denies dysuria, frequency, urgency or hematuria  Musculoskeletal: Denies myalgias, + left muscle weakness, and bone pain, + generalized weakness   24-hour events:  None          Objective   Vitals: BP (!) 148/74   Pulse 86   Temp 98 °F (36.7 °C) (Temporal)   Resp 18   Ht 5' 11\" (1.803 m)   Wt 164 lb 9 oz (74.6 kg)   SpO2 96%   BMI 22.95 kg/m²     I/O:    Intake/Output Summary (Last 24 hours) at 4/25/2019 0900  Last data filed at 4/25/2019 4165  Gross per 24 hour   Intake 1880.83 ml   Output 1450 ml   Net 430.83 ml          CURRENT MEDS :  Scheduled Meds:   aspirin EC  81 mg Oral Daily    clopidogrel  75 mg Oral Daily    ipratropium-albuterol  1 ampule Inhalation Q8H    enoxaparin  40 mg Subcutaneous Daily    sodium chloride flush  10 mL Intravenous 2 concur with these findings.                     MRI brain  1. Areas of recent stroke are seen involving right frontal and   parietal periventricular white matter as well as focal areas of recent   stroke at level of right insular cortex. Clinical correlation   recommended.       2. Severe burden of abnormal signal located in the periventricular and   subcortical white matter suggestive of severe microvascular ischemic   change versus nonspecific encephalopathy.         Labs:  Lab Results   Component Value Date    WBC 9.3 04/22/2019    HGB 9.7 04/22/2019    HCT 30.7 04/22/2019    MCV 99.0 04/22/2019    MCH 31.3 04/22/2019    MCHC 31.6 04/22/2019    RDW 14.5 04/22/2019     04/22/2019    MPV 10.3 04/22/2019     Lab Results   Component Value Date     04/22/2019    K 4.7 04/22/2019    K 4.4 04/21/2019     04/22/2019    CO2 25 04/22/2019    BUN 14 04/22/2019    CREATININE 1.0 04/22/2019    LABALBU 3.2 04/22/2019    CALCIUM 8.2 04/22/2019    GFRAA >60 04/22/2019    LABGLOM >60 04/22/2019     No results found for: PROTIME, INR  No results for input(s): PROBNP in the last 72 hours. Recent Labs     04/24/19  0611   PROCAL 4.75*     This SmartLink has not been configured with any valid records. Micro:  No results for input(s): CULTRESP in the last 72 hours. No results for input(s): LABGRAM in the last 72 hours. No results for input(s): LEGUR in the last 72 hours. Recent Labs     04/22/19  1359   STREPNEUMAGU Presumptive NEGATIVE for Pneumococcal pneumonia, suggesting  no current or recent pneumococcal infection. Infection due  to S. pneumoniae cannot be ruled out since the antigen present  in the sample may be below the detection limit of the test.       Recent Labs     04/22/19  1339   LP1UAG Presumptive NEGATIVE suggesting no recent or current infections  with Legionella pneumophilia serogroup 1.  Infection to  Legionella cannot be ruled out since other serogroups and  species may cause infection, antigen may not be present in  early infection, or level of antigen may be below the  detection limit. Assessment:    1. Acute hypoxic respiratory failure most likely secondary to aspiration pneumonia  2.  Commonly acquired pneumonia /Aspiration pneumonia   3.  Dysphagia  4.  Failure to thrive  5.  History of stroke with new left sided weakness   6. Weak cough       Plan:     1.    continue  Unasyn 3 g every 6 hours  2.    video swallow aspiration thin liquids , modified diet thickened liquids at this time family and pt are not pursuing peg tube   3     Wean FiO2 for saturations above 92%  4.    Aspiration precautions  5.    Albuterol as needed, use with IPPB  6.    Follow chest x-ray   7.    pep/fluttter incentive spirometry with chest vest BID   8. Left sided weakness MRI of brain: see above  9. Bronch deemed unnecessary, cxr reviewed  10. Add IPPB with Duoneb  11. SS looking at placement, recommend one with chest percussion capabilities       This plan of care was reviewed in collaboration with Dr. Rodrigo Oppenheim  Electronically signed by LEANN Acuna CNP on 4/25/2019 at 9:00 AM      Addendum; Patient seen and examined. I talked extensively to son and daughter and wife and reviewed his chest x-rays with him. Patient is awake alert and oriented ×3. His oxygen requirements are down to 3 L. Chest vest has been helping him tremendously. He has been able to using yankauer to suction himself. Patient can be discharged as long as we make sure they're able to provide the chest vest at his [facility]. He will need albuterol every 4 hours while awake. Also ordered him a flutter valve. I personally saw, examined, and cared for the patient. Labs, medications, radiographs reviewed. I agree with history exam and plans detailed in NP note.   Pedro Levi MD

## 2019-04-25 NOTE — PROGRESS NOTES
Poli Melendez is a 80 y.o. right handed male     Neurology is following for stroke     Pt presented to ED on 4/20 with c/o low back and pelvic pain, BLE weakness, fatigue after falling out of the bed at home     Since admission pt found to be hypoxic with pneumonia and started on Unasyn   Pt developed dysphagia and L sided weakness-- MRI confirmed Acute R frontal and parietal stroke    Medically stable    ROS limited due to fatigue     No family present today for exam    Objective:     BP (!) 148/74   Pulse 86   Temp 98 °F (36.7 °C) (Temporal)   Resp 18   Ht 5' 11\" (1.803 m)   Wt 164 lb 9 oz (74.6 kg)   SpO2 96%   BMI 22.95 kg/m²     General appearance: alert, appears stated age, cooperative and no distress  Head: normocephalic, without obvious abnormality, atraumatic  Eyes: conjunctivae/corneas clear.  .  Neck: no adenopathy, no carotid bruit, supple, symmetrical, trachea midline and thyroid not enlarged, symmetric, no tenderness/mass/nodules  Extremities: normal, atraumatic, no cyanosis or edema  Pulses: 2+ and symmetric  Skin: color, texture, turgor normal--redness noted to both feet       Mental Status: Alert, oriented to person, time and state, unable to state he is the hospital, or what city, pt is able to state the current president-- thought content appropriate     Poor attention/concentration due to fatigue-- pt continues to fall asleep during exam     Speech: mild dysarthria  Language: no clear cut aphasias    Cranial Nerves:  I: smell NA   II: visual acuity  NA   II: visual fields Full to finger counting b/l-- chronic vision loss from macular degeneration and glasses not present for exam    II: pupils PERRL   III,VII: ptosis None   III,IV,VI: extraocular muscles  EOMI without nystagmus   V: mastication Normal   V: facial light touch sensation  Normal   V,VII: corneal reflex     VII: facial muscle function - upper  Normal   VII: facial muscle function - lower L facial droop noted    VIII: hearing Normal   IX: soft palate elevation  Normal   IX,X: gag reflex    XI: trapezius strength  5/5   XI: sternocleidomastoid strength 5/5   XI: neck extension strength  5/5   XII: tongue strength  Normal     Motor:  L hemiparesis/hemiplegia   Generalized weakness noted   RUE 4/5, RLE 2/5   Normal bulk and tone  No abnormal movements     Sensory:  LT and PP intact throughout     Coordination:   R FN, FFM intact-- unable to do L FN and FFM due to weakness  HKS exam could not be done due to BLE weakness     Gait:  Not tested     DTR:   Right Brachioradialis reflex 2+  Left Brachioradialis reflex 2+  Right Biceps reflex 2+  Left Biceps reflex 2+  Right Triceps reflex 2+  Left Triceps reflex 2+  Right Quadriceps reflex 2+  Left Quadriceps reflex 2+    No Babinskis  No Thomas's    No pathological reflexes    NIH Stroke Scale:    1A: Level of Consciousness 1 - not alert but arousable by minor stimulation to obey, answer or respond   1B: Ask Month and Age 1 - answers one question correctly   1C:  Tell Patient To Open and Close Eyes, then Hand  Squeeze 0 - performs both tasks correctly   2: Test Horizontal Extraocular Movements 0 - normal   3: Test Visual Fields 0 - no visual loss   4: Test Facial Palsy 2 - partial paralysis (total or near total paralysis of the lower face)   5A: Test Left Arm Motor Drift 3 - no effort against gravity, limb falls   5B: Test Right Arm Motor Drift 0 - no drift, limb holds 90 (or 45) degrees for full 10 seconds   6A: Test Left Leg Motor Drift 3 - no effort against gravity; leg falls to bed immediately   6B: Test Right Leg Motor Drift 0 - no drift; leg holds 30 degree position for full 5 seconds   7: Test Limb Ataxia   (FNF/Heel-Shin) 0 - absent   8: Test Sensation 0 - normal; no sensory loss   9: Test Language/Aphasia 0 - no aphasia, normal   10: Test Dysarthria 1 - mild to moderate, patient slurs at least some words and at worst, can be understood with some difficulty   11: Test

## 2019-04-25 NOTE — DISCHARGE INSTR - COC
Continuity of Care Form    Patient Name: Amie Lewis   :  1932  MRN:  [id number]    Admit date:  2019  Discharge date:  2019    Code Status Order: Full Code   Advance Directives:   [facility] Documentation     Date/Time Healthcare Directive Type of Healthcare Directive Copy in [facility] [address] Agent's Name Healthcare Agent's Phone Number    19 1927  No, patient does not have an advance directive for healthcare treatment -- -- -- -- --          Admitting Physician:  Tawanna Kendrick DO  PCP: Ghassan Colin DO    Discharging Nurse: [facility] Unit/Room#: 4082/7274-B  Discharging Unit Phone Number: [phone]    Emergency Contact:   Extended Emergency Contact Information  Primary Emergency Contact: Yumiko Crespo  Address: [address] [place] [address] Phone: [phone]  Relation: Spouse  Secondary Emergency Contact: Sonia Ryan [address] Phone: [phone]  Mobile Phone: [phone]  Relation: Child    Past Surgical History:  Past Surgical History:   Procedure Laterality Date    APPENDECTOMY      CARDIAC SURGERY      CORONARY ARTERY BYPASS GRAFT      LUMBAR SPINE SURGERY         Immunization History:   Immunization History   Administered Date(s) Administered    Influenza Vaccine, unspecified formulation 10/23/2014, 10/21/2015    Influenza, High Dose (Fluzone 65 yrs and older) 2016, 2017, 10/31/2018    Pneumococcal 13-valent Conjugate ([id number]) 2016    Pneumococcal Polysaccharide ([id number]) 2018       Active Problems:  Patient Active Problem List   Diagnosis Code    Diabetes mellitus without complication ([facility] [address] V44.6    Hyperlipidemia E78.5    Essential hypertension, benign I10    CAD (coronary artery disease) I25.10    Asymptomatic bilateral carotid artery stenosis I65.23    Acute CVA (cerebrovascular accident) ([facility] [address] I63.9    Acute cerebrovascular accident (CVA) ([facility] [address] I63.9    CVA (cerebral vascular accident) ([facility] [address] I63.9    Community acquired pneumonia of left lower lobe of lung ([facility] [address] J18.1    Fall W19. Kenn Matson    Generalized weakness R53.1    Thrombocytopenia (HCC) D69.6    Essential hypertension I10    Controlled type 2 diabetes mellitus without complication (HCC) J18.3    Aspiration pneumonia due to gastric secretions (HCC) J69.0    Acute respiratory failure with hypoxia (HCC) J96.01    Aspiration pneumonia (HCC) J69.0    Multifocal atrial tachycardia (HCC) I47.1    SVT (supraventricular tachycardia) ([facility]) I47.1    Oropharyngeal dysphagia R13.12    Bilateral carotid artery stenosis I65.23       Isolation/Infection:   Isolation          No Isolation            Nurse Assessment:  Last Vital Signs: BP (!) 153/72   Pulse 66   Temp 97.7 °F (36.5 °C) (Temporal)   Resp 28   Ht 5' 11\" (1.803 m)   Wt 164 lb 9 oz (74.6 kg)   SpO2 99%   BMI 22.95 kg/m²     Last documented pain score (0-10 scale): Pain Level: 0  Last Weight:   Wt Readings from Last 1 Encounters:   04/25/19 164 lb 9 oz (74.6 kg)     Mental Status:  oriented and alert    IV Access:  - None    Nursing Mobility/ADLs:  Walking   Dependent  Transfer  Dependent  Bathing  Dependent  Dressing  Dependent  Toileting  Dependent  Feeding  Dependent  Med Admin  Dependent  Med Delivery   whole in applesauce    Wound Care Documentation and Therapy:  Wound 04/23/19 Buttocks Left (Active)   Dressing/Treatment Open to air 4/25/2019  4:07 AM   Wound Assessment Red 4/25/2019  4:07 AM   Drainage Amount None 4/24/2019  4:45 AM   Luh-wound Assessment Other (Comment) 4/24/2019  4:45 AM   Number of days: 1        Elimination:  Continence:   · Bowel: No  · Bladder: No  Urinary Catheter:  Insertion Date:   No date had in on 4/20 hx of recent shaving  Colostomy/Ileostomy/Ileal Conduit: No       Date of Last BM: 4/26    Intake/Output Summary (Last 24 hours) at 4/25/2019

## 2019-04-26 NOTE — CARE COORDINATION
4/26/2019 social work:discharge planning   [facility] accepted patient today and will have [facility] tomorrow. [facility]. Patient's dtr jovan and son kristie notified.  LAKISHA roth

## 2019-04-26 NOTE — PROGRESS NOTES
mins emphasis on static sit balance. Pt transferred to static stand EOB for approx 2 mins , and nsg performed hygiene care d/t incontinence of BM. Pt returned to sit EOB , reporting increased fatigue. A pt back to supine, bed placed in chair position and B UE elevated on pillows. Pt was left supine with call light left by patient. Chair/bed alarm: yes     Time in: 1515  Time out: 1540      Continue with physical therapy current plan of care.     Merlinda Spitz PTA [id number]

## 2019-04-26 NOTE — PROGRESS NOTES
Kaitlin Franco is a 80 y.o. 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  .  Neck: no adenopathy, no carotid bruit, supple, symmetrical, trachea midline and thyroid not enlarged, symmetric, no tenderness/mass/nodules  Extremities: normal, atraumatic, no cyanosis or edema  Pulses: 2+ and symmetric  Skin: color, texture, turgor normal--redness noted to both feet       Mental Status: Alert, oriented to person, time and state, unable to state he is the hospital, or what city, pt is able to state the current president-- thought content appropriate     Adequate attention/concentration      Speech: mild dysarthria  Language: no clear cut aphasias    Cranial Nerves:  I: smell NA   II: visual acuity  NA   II: visual fields Full to finger counting b/l-- chronic vision loss from macular degeneration and glasses not present for exam    II: pupils PERRL   III,VII: ptosis None   III,IV,VI: extraocular muscles  EOMI without nystagmus   V: mastication Normal   V: facial light touch sensation  Normal   V,VII: corneal reflex     VII: facial muscle function - upper  Normal   VII: facial muscle function - lower L facial droop noted    VIII: hearing Normal   IX: soft palate elevation  Normal   IX,X: gag reflex    XI: trapezius strength  5/5   XI: sternocleidomastoid strength 5/5   XI: neck extension strength  5/5   XII: tongue strength  Normal     Motor:  L hemiparesis/hemiplegia   Generalized weakness noted   RUE 4/5, RLE 2/5   Normal bulk and tone  No abnormal movements     Sensory:  LT and PP intact throughout     Coordination:   R FN, FFM intact-- unable to do L FN and FFM due to weakness  HKS exam could not be done due to BLE weakness     Gait:  Not tested     DTR:   Right Brachioradialis reflex 2+  Left Brachioradialis reflex 2+  Right Biceps reflex 2+  Left Biceps reflex 2+  Right Triceps reflex 2+  Left Triceps reflex 2+  Right Quadriceps reflex 2+  Left Quadriceps reflex 2+    No Babinskis  No Thomas's    No pathological reflexes    Laboratory/Radiology:     Lab Results   Component Value Date     (H) 04/26/2019    K 3.6 04/26/2019     (H) 04/26/2019    CO2 29 04/26/2019    BUN 9 04/26/2019    CREATININE 0.9 04/26/2019    GLUCOSE 90 04/26/2019    CALCIUM 8.0 (L) 04/26/2019    LABGLOM >60 04/26/2019    GFRAA >60 04/26/2019     Lab Results   Component Value Date    WBC 4.8 04/26/2019    HGB 9.3 (L) 04/26/2019    HCT 30.1 (L) 04/26/2019    MCV 99.7 04/26/2019     (L) 04/26/2019     Lab Results   Component Value Date    LABA1C 5.2 04/26/2019     Lab Results   Component Value Date    HDL 28 04/26/2019     Lab Results   Component Value Date    LDLCALC 39 04/26/2019     Lab Results   Component Value Date    LABVLDL 23 04/26/2019     US CAROTID ARTERY BILATERAL   Final Result      Velocities suggestive of 70-89% stenosis involving proximal left   cervical internal carotid artery. CTA neck could be helpful for   further evaluation. ALERT:  THIS IS AN ABNORMAL REPORT. XR CHEST PORTABLE   Final Result   Findings are nonspecific but suggest persistent bilateral areas.  Given   the patient's clinical situation, aspiration as etiology must be   considered. A CT scan of the thorax with contrast may be performed for   more definitive evaluation if felt clinically appropriate. CT Head wo 4/21   Extensive involutional changes remain stable when compared   with last study of June 20, 2018.         MRI Brain 4/23/19:      1. Areas of recent stroke are seen involving right frontal and   parietal periventricular white matter as well as focal areas of recent   stroke at level of right insular cortex. Clinical correlation   recommended.       2. Severe burden of abnormal signal located in the periventricular and   subcortical white matter suggestive of severe microvascular ischemic   change versus nonspecific encephalopathy. Echo 4/24:   Left ventricle is normal in size .   Mild left ventricular concentric hypertrophy noted.   No regional wall motion abnormalities seen.   Normal left ventricular ejection fraction.   The left atrium is mildly dilated.   Mild thickening of the mitral valve leaflets.   Moderate mitral annular calcification.   Mild mitral regurgitation is present.   Mild functional mitral stenosis .   The aortic valve appears mildly sclerotic.   Mild tricuspid regurgitation. All labs and imaging reviewed independently today     Assessment:     Pt presented to ED for c/o low back and pelvic, BLE weakness, fatigue s/p recent fall and recent PNA which progressed to dysphagia and L sided weakness. MRI Brain proved acute R frontal and parietal stroke. Echo was unremarkable for wall abnormalities. Pt has known carotid stenosis however vascular does not believe it is symptomatic and has no plans on intervention this admission. Pt also has elevated total CK, elevated BNP, elevated liver enzymes and anemia. At present time pt continues to have L sided weakness and neglect, L facial droop, dysarthria and dysphagia. NIHSS 12 on Thursday.        Plan:     Continue DAPT and statin   LDL 39 and A1C

## 2019-04-26 NOTE — PROGRESS NOTES
Occupational Therapy  OT BEDSIDE TREATMENT NOTE      Date:2019  Patient Name: Angélica Fischer  MRN: [id number]  : 1932  Room: [address] [place], [place] [address]     Evaluating OT: Kiesha Hernandez OTR/L #[id number]     AM-PAC Daily Activity Raw Score:      Recommended Adaptive Equipment:   To be further assessed  On rehab     Diagnosis: acute CVA     Pertinent Medical History: DM, HTN, Glaucoma, CVA     Precautions:  Falls, O2     Home Living: Pt lives with wife in a 1 story with 1-2 step(s) to enter and 1 rail(s); bed/bath on 1st   Bathroom setup: tub shower combo  Equipment owned: ww  Prior Level of Function: Modified Boel[place]  with ADLs , Modified [place]  with IADLs; using ww as of recent for ambulation.      Pain Level: No pain reported     Cognition: A&O: 3/4, pt was fatigued this date with max cues to open with eyes, once seated at EOB pt slowly became more alert & participating in tasks, able to follow 1-2 step commands              Memory:  fair               Sequencing:  fair               Problem solving:  poor              Judgement/safety:  poor                Functional Assessment:    Initial Eval Status  Date: 19 Treatment Status  Date: 19 Short Term Goals  Treatment frequency: 3-5x/week   Feeding Moderate Assist  Mod A   Holding cup to take 2 sips of ice tea, while seated at EOB Stand by Assist    Grooming Maximal Assist  Min A   To comb hair while seated EOB.  Minimal Assist    UB Dressing Maximal Assist  NT  Mod A  Prior session   Minimal Assist    LB Dressing Dependent  Dependent  To don/doff socks while supine in bed  Moderate Assist    Bathing Maximal Assist Max A  Per eval  Moderate Assist    Toileting Dependent  Dependent  Incontinent of BM noted when standing, dep for sea-care  Catheter present  Moderate Assist    Bed Mobility  Supine to sit: NT pt was sitting EOB with PT upon start of OT eval   Sit to supine: Maximal Assist x 2 Max A x 2  Supine to sit  Mod A x 2  Sit to supine   With

## 2019-04-26 NOTE — PROGRESS NOTES
Rosie Friendly, DO    dextrose 50 % solution 12.5 g, 12.5 g, Intravenous, PRN, Deryl Pale Malmer, DO    docusate sodium (COLACE) capsule 200 mg, 200 mg, Oral, Nightly, Deryl Pale Malmer, DO, 200 mg at 04/25/19 2023    dorzolamide (TRUSOPT) 2 % ophthalmic solution 1 drop, 1 drop, Both Eyes, TID, Deryl Pale Malmer, DO, 1 drop at 04/25/19 2023    finasteride (PROSCAR) tablet 5 mg, 5 mg, Oral, Daily, Deryl Pale Malmer, DO, 5 mg at 04/25/19 0840    glucagon (rDNA) injection 1 mg, 1 mg, Intramuscular, PRN, Deryl Pale Malmer, DO    glucose (GLUTOSE) 40 % oral gel 15 g, 15 g, Oral, PRN, Deryl Pale Malmer, DO    hydrALAZINE (APRESOLINE) injection 10 mg, 10 mg, Intravenous, Q6H PRN, Deryl Pale Malmer, DO    insulin lispro (HUMALOG) injection vial 0-12 Units, 0-12 Units, Subcutaneous, TID WC, Deryl Pale Malmer, DO, 4 Units at 04/25/19 1139    insulin lispro (HUMALOG) injection vial 0-6 Units, 0-6 Units, Subcutaneous, Nightly, Deryl Pale Malmer, DO    latanoprost (XALATAN) 0.005 % ophthalmic solution 1 drop, 1 drop, Both Eyes, Daily, Deryl Pale Malmer, DO, 1 drop at 04/25/19 0841    metoprolol tartrate (LOPRESSOR) tablet 50 mg, 50 mg, Oral, BID, Deryl Pale Malmer, DO, 50 mg at 04/25/19 2022    nitroGLYCERIN (NITROSTAT) SL tablet 0.4 mg, 0.4 mg, Sublingual, Q5 Min PRN, Deryl Pale Malmer, DO    sodium chloride flush 0.9 % injection 10 mL, 10 mL, Intravenous, PRN, Deryl Pale Malmer, DO    tamsulosin [facility]) capsule 0.4 mg, 0.4 mg, Oral, Nightly, Deryl Pale Malmer, DO, 0.4 mg at 04/25/19 2023    Objective:    BP (!) 176/70   Pulse 73   Temp 99.1 °F (37.3 °C) (Temporal)   Resp 24   Ht 5' 11\" (1.803 m)   Wt 167 lb (75.8 kg)   SpO2 93%   BMI 23.29 kg/m²     Heart:  Reg with ectopy  Lungs:  CTA bilaterally, no wheeze, rales or rhonchi  Abd: bowel sounds present, nontender, nondistended, no masses  Extrem:  l sided weakness    CBC with Differential:    Lab Results   Component Value Date    WBC 4.8 04/26/2019    RBC 3.02 04/26/2019    HGB 9.3 04/26/2019    HCT 30.1 04/26/2019     04/26/2019    MCV 99.7 04/26/2019    MCH 30.8 04/26/2019    MCHC 30.9 04/26/2019    RDW 14.2 04/26/2019    NRBC 0.9 06/20/2018    LYMPHOPCT 6.5 04/22/2019    MONOPCT 5.6 04/22/2019    BASOPCT 0.2 04/22/2019    MONOSABS 0.52 04/22/2019    LYMPHSABS 0.60 04/22/2019    EOSABS 0.02 04/22/2019    BASOSABS 0.02 04/22/2019     CMP:    Lab Results   Component Value Date     04/26/2019    K 3.6 04/26/2019    K 4.4 04/21/2019     04/26/2019    CO2 29 04/26/2019    BUN 9 04/26/2019    CREATININE 0.9 04/26/2019    GFRAA >60 04/26/2019    LABGLOM >60 04/26/2019    GLUCOSE 90 04/26/2019    PROT 6.4 04/22/2019    LABALBU 3.2 04/22/2019    CALCIUM 8.0 04/26/2019    BILITOT 0.5 04/22/2019    ALKPHOS 182 04/22/2019    AST 33 04/22/2019    ALT 23 04/22/2019     Warfarin PT/INR:  No results found for: INR, PROTIME    Assessment:    Principal Problem:    Acute cerebrovascular accident (CVA) ([facility] [address]  Active Problems:    Hyperlipidemia    CAD (coronary artery disease)    Community acquired pneumonia of left lower lobe of lung ([address]    Essential hypertension    Acute respiratory failure with hypoxia (HCC)    Aspiration pneumonia (HCC)    Multifocal atrial tachycardia (HCC)    Oropharyngeal dysphagia    Bilateral carotid artery stenosis    Lethargy  Resolved Problems:    * No resolved hospital problems.  *      Plan:  Increase ramipril dc to snf        Shaunleandro Kwasi  9:06 AM  4/26/2019

## 2019-04-26 NOTE — CARE COORDINATION
4/26/2019 social work:discharge planning  Was noted that [facility] can no do chest vest. Sw called [facility] and dtr jovan. They chose 1 [facility] 2 [facility]. referrals made.

## 2019-04-26 NOTE — PROGRESS NOTES
James Mane M.D.,[facility]  Ruben Ibarra D.O., FBREANNA.OOSIEL., Lina Magallanes M.D. Bhavin Drake M.D., James Parada M.D. Daily Pulmonary Progress Note    Patient:  Manuel Crespo 80 y.o. male MRN: [id number]     Date of Service: 4/26/2019      Synopsis      We are following patient for hypoxia   \"CC\" cough      Code status:full            Subjective              Patient was seen and examined today lying in the bed in no apparent distress. He is on 3L He has some difficulty with expressive aphasia s/p CVA. Son at bedside. He has a weak cough and difficulty with secretions, continue with chest vest therapy. Bronch was cancelled d/t acute stroke. He is planning on discharging to [address] [place] [address] today.       Review of Systems:  Constitutional: Denies fever, weight loss, night sweats, and fatigue  Skin: Denies pigmentation, dark lesions, and rashes   HEENT: Denies hearing loss, tinnitus, ear drainage, epistaxis, sore throat, and hoarseness. Cardiovascular: Denies palpitations, chest pain, and chest pressure. Respiratory: + cough,- dyspnea at rest, hemoptysis, apnea, and choking.   Gastrointestinal: Denies nausea, vomiting, poor appetite, diarrhea, heartburn or reflux  Genitourinary: Denies dysuria, frequency, urgency or hematuria  Musculoskeletal: Denies myalgias, + left muscle weakness, and bone pain, + generalized weakness   24-hour events:  None          Objective   Vitals: BP (!) 176/70   Pulse 73   Temp 99.1 °F (37.3 °C) (Temporal)   Resp 24   Ht 5' 11\" (1.803 m)   Wt 167 lb (75.8 kg)   SpO2 93%   BMI 23.29 kg/m²     I/O:    Intake/Output Summary (Last 24 hours) at 4/26/2019 0848  Last data filed at 4/26/2019 0653  Gross per 24 hour   Intake 1740.94 ml   Output 1275 ml   Net 465.94 ml          CURRENT MEDS :  Scheduled Meds:   ramipril  2.5 mg Oral Daily    aspirin EC  81 mg Oral Daily    clopidogrel  75 mg Oral Daily    ipratropium-albuterol  1 ampule Inhalation Q8H    enoxaparin  40 mg Subcutaneous Daily    sodium chloride flush  10 mL Intravenous 2 times per day    ampicillin-sulbactam  3 g Intravenous Q6H    atorvastatin  80 mg Oral Nightly    brimonidine  1 drop Both Eyes Q12H    And    timolol  1 drop Both Eyes Q12H    docusate sodium  200 mg Oral Nightly    dorzolamide  1 drop Both Eyes TID    finasteride  5 mg Oral Daily    insulin lispro  0-12 Units Subcutaneous TID WC    insulin lispro  0-6 Units Subcutaneous Nightly    latanoprost  1 drop Both Eyes Daily    metoprolol tartrate  50 mg Oral BID    tamsulosin  0.4 mg Oral Nightly       Physical Exam:  General Appearance: appears comfortable in no acute distress. HEENT: Normocephalic atraumatic without obvious abnormality   Neck: Lips, mucosa, and tongue normal.  Supple, symmetrical, trachea midline, no adenopathy;thyroid:  no enlargement/tenderness/nodules or JVD. Lung:  Breath sounds CTA, diminished in the bases. Respirations   unlabored. Symmetrical expansion. Heart: RRR, normal S1, S2. No MRG  Abdomen: Soft, NT, ND. BS present x 4 quadrants. No bruit or organomegaly. Extremities: Pedal pulses 2+ symmetric b/l. Extremities normal, no cyanosis, clubbing, or edema. Left sided weakness  Musculokeletal: No joint swelling, no muscle tenderness. Left sided weakness  Neurologic: Mental status: left sided weakness upper and lower     Pertinent/ New Labs and Imaging Studies     Study was remarkable for laryngeal penetration of thin   and nectar consistencies with aspiration of thin. Patient did   demonstrate cough reflex after aspiration. Patient also demonstrated   retention within the vallecula and piriform sinuses. Patient   demonstrated oral and pharyngeal delay. There was also evidence for   reduced laryngeal elevation.       This procedure was performed by Trisha Johansen CNP under the   indirect supervision of Tonny Tovar MD.       The exam has been dictated and signed by Nara Harden. Tristian Johansen CNP.         I, Kristal Power MD, Radiologist, have reviewed the images and   report and concur with these findings.                     MRI brain  1. Areas of recent stroke are seen involving right frontal and   parietal periventricular white matter as well as focal areas of recent   stroke at level of right insular cortex. Clinical correlation   recommended.       2. Severe burden of abnormal signal located in the periventricular and   subcortical white matter suggestive of severe microvascular ischemic   change versus nonspecific encephalopathy.         Labs:  Lab Results   Component Value Date    WBC 4.8 04/26/2019    HGB 9.3 04/26/2019    HCT 30.1 04/26/2019    MCV 99.7 04/26/2019    MCH 30.8 04/26/2019    MCHC 30.9 04/26/2019    RDW 14.2 04/26/2019     04/26/2019    MPV 11.0 04/26/2019     Lab Results   Component Value Date     04/26/2019    K 3.6 04/26/2019    K 4.4 04/21/2019     04/26/2019    CO2 29 04/26/2019    BUN 9 04/26/2019    CREATININE 0.9 04/26/2019    LABALBU 3.2 04/22/2019    CALCIUM 8.0 04/26/2019    GFRAA >60 04/26/2019    LABGLOM >60 04/26/2019     No results found for: PROTIME, INR  No results for input(s): PROBNP in the last 72 hours. Recent Labs     04/24/19  0611   PROCAL 4.75*     This SmartLink has not been configured with any valid records. Micro:  No results for input(s): CULTRESP in the last 72 hours. No results for input(s): LABGRAM in the last 72 hours. No results for input(s): LEGUR in the last 72 hours. No results for input(s): STREPNEUMAGU in the last 72 hours. No results for input(s): LP1UAG in the last 72 hours. Assessment:    1. Acute hypoxic respiratory failure most likely secondary to aspiration pneumonia  2.  Commonly acquired pneumonia /Aspiration pneumonia   3.  Dysphagia  4.  Failure to thrive  5.  History of stroke with new left sided weakness   6.   Weak cough       Plan:     1.    continue  Unasyn 3 g every 6 hours  2.    video swallow aspiration thin liquids , modified diet thickened liquids at this time family and pt are not pursuing peg tube   3     Wean FiO2 for saturations above 92%  4.    Aspiration precautions  5.    Albuterol q4 hours PRN and Duoneb q8 hours, use with IPPB  6.    Follow chest x-ray   7.    pep/fluttter incentive spirometry with chest vest BID   8. Left sided weakness MRI of brain: see above  9. Bronch deemed unnecessary, cxr reviewed  10. SS looking at placement, recommend one with chest percussion capabilities       This plan of care was reviewed in collaboration with Dr. Debbi Perez  Electronically signed by LEANN Nicole - CNP on 4/26/2019 at 8:48 AM    I personally saw, examined, and cared for the patient. Labs, medications, radiographs reviewed. I agree with history exam and plans detailed in NP note.   Marcel Mata MD

## 2019-04-30 PROBLEM — A41.9 SEPSIS (HCC): Status: ACTIVE | Noted: 2019-01-01

## 2019-04-30 NOTE — ED NOTES
Bed: 13  Expected date:   Expected time:   Means of arrival:   Comments:     Cynthia Scales, RN  04/30/19 2076

## 2019-04-30 NOTE — ED PROVIDER NOTES
Department of Emergency Medicine   ED  Provider Note  Admit Date/RoomTime: 4/30/2019  4:59 PM  ED Room: 13/13                History of Present Illness:  4/30/19, Time: 5:12 PM         Porfirio Wing is a 80 y.o. male presenting to the ED for shortness of breath, beginning yesterday, worse today. The complaint has been persistent, moderate in severity, and worsened by nothing. Patient and family repots just discharged from here to nursing home 1 week ago for pneumonia and stroke. Left sided weakness improving, however shortness of breath started worsening yesterday, became tachypneic today and requiring increased supplemental O2 today so NH sent him here. He also reports increased cough. He denies chest pain or any other symptoms. Review of Systems:   Pertinent positives and negatives are stated within HPI, all other systems reviewed and are negative.        --------------------------------------------- PAST HISTORY ---------------------------------------------  Past Medical History:  has a past medical history of AAA (abdominal aortic aneurysm) ([facility] [facility] CVA (cerebral vascular accident) ([facility] [address] [facility] Diabetes [facility] Glaucoma, High blood sugar, and Hypertension. Past Surgical History:  has a past surgical history that includes Appendectomy; Coronary artery bypass graft; Cardiac surgery; and Lumbar spine surgery. Social History:  reports that he quit smoking about 29 years ago. His smoking use included cigarettes. He smoked 1.00 pack per day. He has never used smokeless tobacco. He reports that he drinks alcohol. He reports that he does not use drugs. Family History: family history includes Cancer in his sister; Cirrhosis in his brother; Kidney Disease in his mother. The patients home medications have been reviewed. Allergies: Patient has no known allergies.         ---------------------------------------------------PHYSICAL EXAM--------------------------------------    Constitutional/General: # 0.02 (L) 0.05 - 0.50 E9/L    Basophils # 0.03 0.00 - 0.20 W5/V   Basic Metabolic Panel   Result Value Ref Range    Sodium 147 (H) 132 - 146 mmol/L    Potassium 4.8 3.5 - 5.0 mmol/L    Chloride 107 98 - 107 mmol/L    CO2 31 (H) 22 - 29 mmol/L    Anion Gap 9 7 - 16 mmol/L    Glucose 196 (H) 74 - 99 mg/dL    BUN 11 8 - 23 mg/dL    CREATININE 1.0 0.7 - 1.2 mg/dL    GFR Non-African American >60 >=60 mL/min/1.73    GFR African American >60     Calcium 8.5 (L) 8.6 - 10.2 mg/dL   Brain Natriuretic Peptide   Result Value Ref Range    Pro-BNP 4,837 (H) 0 - 450 pg/mL   Troponin   Result Value Ref Range    Troponin 0.05 (H) 0.00 - 0.03 ng/mL   Lactate, Sepsis   Result Value Ref Range    Lactic Acid, Sepsis 1.8 0.5 - 1.9 mmol/L   Urinalysis   Result Value Ref Range    Color, UA Yellow Straw/Yellow    Clarity, UA Clear Clear    Glucose, Ur Negative Negative mg/dL    Bilirubin Urine Negative Negative    Ketones, Urine Negative Negative mg/dL    Specific Gravity, UA 1.025 1.005 - 1.030    Blood, Urine LARGE (A) Negative    pH, UA 6.5 5.0 - 9.0    Protein,  (A) Negative mg/dL    Urobilinogen, Urine 0.2 <2.0 E.U./dL    Nitrite, Urine Negative Negative    Leukocyte Esterase, Urine SMALL (A) Negative   Microscopic Urinalysis   Result Value Ref Range    WBC, UA >20 0 - 5 /HPF    RBC, UA >20 0 - 2 /HPF    Bacteria, UA FEW (A) /HPF    Yeast, UA MANY        RADIOLOGY:  Interpreted by Radiologist.  XR CHEST PORTABLE   Final Result      1. Increasing opacity in the left mid to lower lung compatible with   pneumonia. 2. Persistent lesion in the right midlung which is moderately   suspicious for underlying neoplasm and CT with contrast is recommended   when this can be accomplished. EKG:  This EKG is signed by emergency department physician.     Rate: 127  Rhythm: Atrial fibrillation  Interpretation: right bundle branch block  Comparison: stable as compared to patient's most recent EKG ------------------------- NURSING NOTES AND VITALS REVIEWED ---------------------------   The nursing notes within the ED encounter and vital signs as below have been reviewed by myself. BP (!) 182/71   Pulse 118   Temp 99.1 °F (37.3 °C)   Resp 24   Ht 5' 11\" (1.803 m)   Wt 167 lb (75.8 kg)   SpO2 97%   BMI 23.29 kg/m²   Oxygen Saturation Interpretation: Normal on supplemental O2    The patients available past medical records and past encounters were reviewed. ------------------------------ ED COURSE/MEDICAL DECISION MAKING----------------------  Medications   vancomycin (VANCOCIN) 2,000 mg in dextrose 5 % 500 mL IVPB (2,000 mg Intravenous New Bag 4/30/19 1922)   0.9 % sodium chloride bolus (0 mL/kg × 75.8 kg Intravenous Stopped 4/30/19 1946)   ipratropium-albuterol (DUONEB) nebulizer solution 1 ampule (1 ampule Inhalation Given 4/30/19 1758)   piperacillin-tazobactam (ZOSYN) 4.5 g in sodium chloride 0.9 % 100 mL IVPB (mini-bag) (0 g Intravenous Stopped 4/30/19 1917)           SIRS CRITERIA:    Temp >38 C (100.4 F) or <36 C (96.8 F)   NO. Heart Rate >90:   YES  +1.     Resp. Rate >20 or PaCO2 < 32 mmHg:   YES  +1. WBC <4K or >12K  or >10% Bands:   YES  +1. Total:   3     ** Two or more above criteria met? Yes**       SEPSIS CRITERIA: (both required)    2 or more SIRS Criteria   yes (1). Source/possible source   yes (1). Qualifies for Sepsis   yes (1). Severe Sepsis: (before fluid resuscitation)  Sepsis criteria yes (1). SBP <90 no (0). Creatinine >2.0 no (0). INR > 1.5 or aPTT >60 sec no (0). Platelets <741J no (0). TBili >2 no (0). Lactate >2 no (0). SEPTIC SHOCK CRITERIA:    Lactatre > 4 mmol/L on any reading   no (0). SBP<90 despite 30cc/kg fluid bolus   no (0). SEPSIS Core Measures:   30cc/kg bolus   yes (1). Lactic Acid   yes (1). Blood Cultures   yes (1). Empiric Antibiotics   yes (1).     Central Venous Catheter no (0). Vasopressor for SBP<90 or MAP <65   no (0). ANTIBIOTIC SELECTION RATIONAL  [id number] Antibiogram, Coverage of suspected multiple concurrent sources and Recent culture results    ANTIBIOTIC SELECTION LIMITATIONS  None    LACTIC ACID    Initial     1.8      FLUID ADMINISTRATION BARRIERS  None     OTHER TREATMENT BARRIERS  None    CENTRAL LINE INSERTED? No        Medical Decision Making:    Patient presents with sepsis secondary to worsening pneumonia with concern for aspiration and hospital-acquired patient given Zosyn and vancomycin. Patient given 30 mL/kg fluid with some improvement in heart rate. Patient remains slightly hypertensive during hospital stay. Patient will be admitted further evaluation and treatment. Troponin elevated, but no chest pain or acute ekg changes, should be followed, possibly 2/2 to hypoxia. ED Course as of Apr 30 2048   Tue Apr 30, 2019   1900 No change in symptoms, exam. Updated patient and family on results and plan. [MF]   2029 Discussed case with Dr. Audrey Watson - he will admit patient. []      ED Course User Index  [MF] Shawn Mackey DO         This patient's ED course included: multiple bedside re-evaluations, IV medications, cardiac monitoring, continuous pulse oximetry, complex medical decision making and emergency management and a personal history and physicial eaxmination    This patient has remained hemodynamically stable and improved during their ED course. Re-Evaluations:             Re-evaluation. Patients symptoms are improving          Counseling: The emergency provider has spoken with the patient and discussed todays results, in addition to providing specific details for the plan of care and counseling regarding the diagnosis and prognosis.   Questions are answered at this time and they are agreeable with the plan.       --------------------------------- IMPRESSION AND DISPOSITION ---------------------------------    IMPRESSION  1. Pneumonia due to organism    2. Septicemia ([facility] [address]    3. Elevated troponin        DISPOSITION  Disposition: Admit to telemetry  Patient condition is stable        NOTE: This report was transcribed using voice recognition software.  Every effort was made to ensure accuracy; however, inadvertent computerized transcription errors may be present         Gisela Chua DO  Resident  04/30/19 2050

## 2019-04-30 NOTE — ED NOTES
Blood cultures obtained from [facility], per policy. Set one of two drawn at this time.                Carlos Kennedy RN  04/30/19 6487

## 2019-04-30 NOTE — ED NOTES
Blood cultures obtained from [facility], per policy. Set two of two drawn at this time.                Valent[place]Van [place], [place]  24/47/31 7080

## 2019-04-30 NOTE — TELEPHONE ENCOUNTER
Writer contacted Mona Medina, ED provider to inform of 30 day readmission risk. ED provider informed writer of readmission.

## 2019-05-01 PROBLEM — E43 SEVERE PROTEIN-CALORIE MALNUTRITION (HCC): Status: ACTIVE | Noted: 2019-01-01

## 2019-05-01 NOTE — CARE COORDINATION
Care Coordination:  Pt is from [facility] [place], operated by [facility]. Skilled and not a bed hold. Will take back.  Medicare, therefore no precert    Alena Nap

## 2019-05-01 NOTE — PROGRESS NOTES
initiation of this evaluation.      ADMITTING DIAGNOSIS: Sepsis ([facility] [address] [A41.9]     ACTIVE PROBLEM LIST:   Patient Active Problem List   Diagnosis    Diabetes mellitus without complication ([facility]    Hyperlipidemia    Essential hypertension, benign    CAD (coronary artery disease)    Asymptomatic bilateral carotid artery stenosis    Acute CVA (cerebrovascular accident) ([facility]    Acute cerebrovascular accident (CVA) ([facility]    CVA (cerebral vascular accident) ([facility] [facility]   [address] acquired pneumonia of left lower lobe of lung ([facility]    Fall    Generalized weakness    Thrombocytopenia ([facility]    Essential hypertension    Controlled type 2 diabetes mellitus without complication ([facility]    Aspiration pneumonia due to gastric secretions (HCC)    Acute respiratory failure with hypoxia (HCC)    Aspiration pneumonia (HCC)    Multifocal atrial tachycardia (HCC)    SVT (supraventricular tachycardia) (HCC)    Oropharyngeal dysphagia    Bilateral carotid artery stenosis    Lethargy    Sepsis ([address]

## 2019-05-01 NOTE — PROGRESS NOTES
agreeable to PT evaluation. BUE and BLE assessed in supine. PROM to L shoulder and elbow performed. Very mild separation at L shoulder felt. Educated pt, pt's dtr, and RN on positioning of L shoulder to prevent further sublux. Assist of trunk and BLE to EOB. Pt sat with fair sitting balance but requires some assistance for L posterolateral lean. Educated on sitting balance, pt improves with cues. AROM of BLE at EOB. STS performed with L knee block. Pt requires total assist to stand but once in standing is able to facilitate upright posture. Facilitated WB through BLE with L knee block. Verbal cues for upright posture. Pt tolerated standing 1-2 minutes. Returned to sitting. Assisted back to supine. Scooted up in bed. Pt placed in the semi-chair position with pillows utilized to facilitate upright posture, joint and skin integrity, and interaction with environment. LUE propped on pillows. Warm blankets provided. Pt left with call button in reach and needs met. Briefly spoke with RN at end of session. Pts/ family goals   1. Rehab     Patient and or family understand(s) diagnosis, prognosis, and plan of care. Yes     PLAN  PT care will be provided in accordance with the objectives noted above. Whenever appropriate, clear delegation orders will be provided for nursing staff. Exercises and functional mobility practice will be used as well as appropriate assistive devices or modalities to obtain goals. Patient and family education will also be administered as needed. Frequency of treatments: 2-5x/week x 7-10 days.     Time in  1125  Time out  [address], PT, DPHENRI  [id number]

## 2019-05-01 NOTE — PROGRESS NOTES
Palliative Medicine Social Work     Patient Name: Nancye Olszewski  Age: 80 y.o.  Status: no    Next of Delmi Gooden               VU:[phone]    Additional Support: son Gael Evangelista [phone], daughter Tina Meyer [phone]    Minor Children: no    Advanced Directives: yes dated 1/9/1995 appoints either his wife Katelyn Fritz or his daughter Tina Meyer. Confirm Code Status: DNRCCA     Current Goals of Care:mprove or maintain function/ quality of life, return to snf and continue current management. Mental Health History: no    Substance Abuse:no    Indications of Abuse/Neglect: no    Financial Concerns: no    Living Situation: Pt has been at [facility] for 3 days for rehab after a stroke, prior to that he lived with his wife and caregivers. Physical Care Needs Met: yes    Emotional Needs Met: time spent with his wife exploring pts thoughts and feelings, providing support through active listening and validation of feelings. Future Care Needs/Goals/Anticipatory Guidance: return to snf for rehab, hospice was introduced if pt should further decline    Referral Needs: none at present    Assessment: 79 yo  male admitted from snf where he has been for 3 days after having a stroke, and recent pneumonia. He is admitted now with aspiration pneumonia. This was discussed by NP at bedside with his wife. Pt is resting but does awaken. Wife agrees to dnrcca, based on pts expressed wishes to not be on artificial  life support. His goal is to continue rehab and hopefully recover, however they did discuss hospice if it should be needed in the future.

## 2019-05-01 NOTE — PLAN OF CARE
Problem: Malnutrition  (NI-5.2)  Goal: Food and/or Nutrient Delivery  Description- Monitor nutrition progression   Individualized approach for food/nutrient provision.   Outcome: Met This Shift

## 2019-05-01 NOTE — PROGRESS NOTES
Call placed to Dr. Ralph Caballero regarding new consult for aspiration pneumonia and yeast present in urine.

## 2019-05-01 NOTE — PROGRESS NOTES
OCCUPATIONAL THERAPY INITIAL EVALUATION      Date:2019  Patient Name: Amie Lewis  MRN: [id number]  : 1932  Room: [address] [place] [address]    Evaluating OT: Artemio Lin, LILA, OTR/L [id number]    AM-PAC Daily Activity Raw Score:   Recommended Adaptive Equipment: TBD     Diagnosis: sepsis   Surgery: n/a   Pertinent Medical History: h/o CVA, CAD, AAA, HTN, DM, glaucoma   Precautions:  Falls, L flaccidity, 02, TAPS, lagos cath     Home Living: Pt is from [facility], just being d/c recently from hospital for CVA. Per chart review, Pt was previously at home with wife in a 1 story home. Tub shower unit. Prior Level of Function: pt demo'ing difficulty verbalizing AD he was using at [facility]. Pt required assist with ADLs     Pain Level: 0/10  Cognition: A&O: 3/4 (date,person,place); Follows 1 step directions   Memory:  fair    Sequencing:  fair    Problem solving:  fair    Judgement/safety:  fair      Functional Assessment:   Initial Eval Status  Date: 19 Treatment Status  Date: Short Term Goals  Treatment frequency: 2-4 x/week   Feeding NPO     Grooming Min A (required cues to wash face with washcloth)   SBA   UB Dressing Max A   Mod A   LB Dressing Dep (pt demo'ing difficulty donning B socks)  Max A    Bathing NT  Mod A    Toileting NT  Max A   Bed Mobility  Log roll: Max A  Supine to sit: Max A   Sit to supine: Max A   Log roll Mod A  Supine to sit: Mod A   Sit to supine:  Mod A   Functional Transfers Sit to stand:NT   Stand to sit:NT  Commode: NT  Max A   Functional Mobility NT  Max A   Balance Sitting:     Static:  Mod A (progressed to Min A) (retro/L sided leans)    Dynamic:Mod A  Standing: NT     Activity Tolerance Poor (pt appearing very drowsy and lethargic, activity tolerance is limited)     Visual/  Perceptual Glasses: no                UE ROM: RUE: WFL  LUE: flaccid  Strength: RUE: grossly 4/5 LUE: grossly 0/5   Strength: R: WFL L: limited  Fine Motor Coordination: R: WFL L: limited Hearing: WFL  Sensation:  No c/o numbness or tingling  Tone:  WFL  Edema: none noted                            Comments/Treatment: Cleared by RN to see pt. Upon arrival, patient supine in bed and agreeable to OT session. Required cues throughout session to stay alert and pt appearing lethargic. Completed supine-sit EOB Max A. Required hand over hand assistance and cues to hold onto bed to increase sitting balance/tolerance. Pt able to sit EOB ~5-10 mins. Pt able to identify call bell for assistance. Daughter present for session. At end of session, patient supine in bed with call light and phone within reach, all lines and tubes intact. Pt would benefit from continued skilled OT to increase functional independence and quality of life. Eval Complexity: moderate  · History: Expanded chart review of medical records and additional review of physical, cognitive, or psychosocial history related to current functional performance  · Exam: 3+ performance deficits  · Assistance/Modification: mod/max assistance or modifications required to perform tasks. May have comorbidities that affect occupational performance.     Assessment of current deficits   Functional mobility [x]  ADLs [x] Strength [x]  Cognition [x]  Functional transfers  [x] IADLs [x] Safety Awareness [x]  Endurance [x]  Fine Motor Coordination [x] Balance [x] Vision/perception [] Sensation []   Gross Motor Coordination [] ROM [x] Delirium []                  Motor Control []    Plan of Care:   ADL retraining [x]   Equipment needs [x]   Neuromuscular re-education [x] Energy Conservation Techniques [x]  Functional Transfer training [x] Patient and/or Family Education [x]  Functional Mobility training [x]  Environmental Modifications [x]  Cognitive re-training []   Compensatory techniques for ADLs [x]  Splinting Needs []   Positioning to improve overall function [x]   Therapeutic Activity [x]  Therapeutic Exercise  [x]  Visual/Perceptual: []    Delirium prevention/treatment  []   Other:  []    Rehab Potential: Good for established goals, pt. assisted in establishment of goals. Patient / Family Goal: not stated     Patient and/or family were instructed on diagnosis, prognosis/goals and plan of care. Demonstrated fair understanding. [] Malnutrition indicators have been identified and nursing has been notified to ensure a dietitian consult is ordered. Evaluation time includes thorough review of current medical information, gathering information on past medical & social history & PLOF, completion of standardized testing, informal observation of tasks, consultation with other medical professions/disciplines, assessment of data & development of POC/goals. Tx. Time in: 11:00  Tx.  Time out: 11:17  moderate Evaluation + 17 timed treatment minutes    Svetlana , [facility] [place] OTR/L [id number]

## 2019-05-01 NOTE — PROGRESS NOTES
Perfect serve to Dr. Sreedhar Richard for new consult regarding aspiration pneumonia and acute respiratory failure.

## 2019-05-01 NOTE — CARE COORDINATION
Care Coordination: Visited patient to discuss transition of care upon discharge. Pt is from [facility]. Family in room- daughter and wife present. Plan is to return to [facility]. They are just concerned that he left on PO antibiotic and got worse with pneumonia. They want to make sure he is well prior to leaving this time. He was discharged this last Friday and returned as readmission d/t worsening of pneumonia. States he was on po Augmentin and chest vest with aerosols.     Will follow     Magali Antoine

## 2019-05-01 NOTE — PROGRESS NOTES
Spoke w/ Dr. Qiu Pam re: no labs being ordered this am & pt failed his bedside swallow. Paige Ortega per Dr. Qiu Pam okay to give pt his meds with water until SLP evaluates pt

## 2019-05-01 NOTE — CONSULTS
Palliative Medicine   Consult Note    Provider: Reji Mcnulty RN, CNS, [facility] Day: 2    Referring Provider: Dr. Darlene Strickland    Reason for Consult:  ____Advanced Care Planning  _x___Assist with goals of care  _x___Code status  _x___Pt/family support  _x___Symptom Management      Recommendations:  1. Psychosocial support of patient and family: met with wife Candy Hewitt at the bedside;  @ [phone]; son Baldomero Hooks @ [phone]; Daina Ayon   2. Assist with goals of care: continue current care; hopes to clear pneumonia and return back to facility to continue rehab  3. Advance Care Planning: has DPOA for health care on chart; names Daina Ayon dated/timed 1-9-95  4. Anticipatory Guidance: 80 yr old with recent stroke; now with likely aspiration pneumonia and sepsis  5. Code Status: full - changed to limited- no CPR; no shock; no intubation; meds only; ok for NIV [facility]- intubation)  6. Symptom Management- no acute distress    Chief complaint: SOB    HPI:   Maria D Webb is a 80 y.o. male with significant past medical history of AAA, CAD (s/p CABG-on Plavix and ASA), BPH,  Recent CVA (left sided weakness), Diabetes, Glaucoma, and Hypertension who presented from [facility] to ED for shortness of breath which progressively worsened. Patient and family reports he was just discharged from here to a nursing home (for rehab) 1 week ago for pneumonia and stroke. His left sided weakness was improving but SOB was worsening; he became tachypneic; requiring increased supplemental O2. He also reports increased cough Workup noted; White count slightly up 12.1; proBNP 4837; troponin 0.05; urine with blood and small amt of leukocyte esterase.; CXR noted increasing opacity in left mid/lower lung likely pneumonia; also noted is right midlung lesion suspicious for neoplasm. EKG -Afib with RBBB. Pt was started on nebulizers and IV antibiotics. Pt admitted with sepsis for further care.  Pt had been in [facility] 4/20-4/23 with pneumonia. ; had acute stroke and was transferred to . Pulmonology saw pt at that time. Palliative Medicine has been consulted to assist pt/family with establishing goals of care; code status discussion; family support and symptom management. Palliative Medicine Encounter:  Mr Amador Cardona is laying in bed on oxygen per n/c 6L; SPO2 97%. He is oriented to person/place; follows commands; is able to lift right leg up and has strong right hand squeeze; continues with left sided weakness. Still dyspneic with RR in the 30s. White count trending up @ 15.4; Hgb 9.2. Speech evaluation noted moderate-marked oropharyngeal dysphagia with recommendations for dysphagia 1 diet (pureed foods). Pulmonology has been consulted. ID consulted for antibiotic management for UTI and pneumonia. Pt has a DPOA for health care in chart naming wife Lisa Campoverde and daughter Maxx Vo as agents; dated/signed 1-9-95. Met with wife at bedside, Hi Tabares. Updated her on pt's current condition; questions were answered. We spoke about the pt's wishes and she states he would not want long term life support; explained options; recommended no heroics; wife agreed to limited- no CPR; no shock; no intubation; but ok for meds and NIV. [facility]- no intubation). Family does not want PEG placement; likely will continue to have chronic aspiration. GOALS= treat pneumonia; return to rehab with hopes to return home. They have caregivers ([employer]) for both wife and pt. She was brought to the hospital today by a caregiver. Son is local and daughter in [place]; available for support. Support offered. Appreciative. Staff nurse- updated.          Past Medical History:   Diagnosis Date    AAA (abdominal aortic aneurysm) ([address]     CAD (coronary artery disease)     CVA (cerebral vascular accident) ([address]     [facility]     Glaucoma     High blood sugar     Hyperlipidemia     Hypertension        Past Surgical History: quality      Vitals:   Vitals:    19 0240 19 0255 19 0745 19 0905   BP: (!) 152/69  (!) 156/89    Pulse: 98  92    Resp: (!) 36  (!) 33    Temp: 98.5 °F (36.9 °C)  98.7 °F (37.1 °C)    TempSrc: Oral  Temporal    SpO2: 94%  98% 97%   Weight:  156 lb (70.8 kg)     Height:           Physical Exam:    Gen:  Frail; elderly male,  awake, following commands, in no acute distress; oriented to self/place  HEENT: Pupils equal and round; reactive to light  Neck: Supple  Lungs: CTA bilaterally; diminished bilaterally; tachypneic;  no audible rhonchi or wheezes noted  Heart: irregular, no murmur, rub, or gallop noted during exam  Abd: Soft, non tender, non distended, BS+  Ext: left sided weakness;  no edema, pulses present; strong right hand grasp; able to lift right let; not moving leg  Skin:  Warm and dry  Neuro: Alert, oriented to person/place; following commands; identified wife    Cultures: blood cultures pending    Imagin/30 CXR:         Impression:         1. Increasing opacity in the left mid to lower lung compatible with  pneumonia. 2. Persistent lesion in the right midlung which is moderately  suspicious for underlying neoplasm and CT with contrast is recommended  when this can be accomplished.                 Impression  Active Problems:    Sepsis ([address]  Resolved Problems:    * No resolved hospital problems. *      Assessment:   Yudelka Novak is a 80 y.o. male with hx of stroke d/c to nursing home recently; developed worsening SOB: found with pneumonia. Pt have increased heart rate; leukocytosis and hypoxia- was admitted; treated with antibiotics and nebulizers. Plan:   1. Pneumonia- continue nebulizers; oxygen; monitor; continue antibiotics  2. Sepsis- continue antibiotics; ID follwoing  3. Code status - full; changed to limited- no CPR: no shock; no intubation; meds only; ok for NIV  4. Recent CVA- has left sided weakness  5.  Does have DPOA/HCPOA- in place; wife Wes Stewart and daughter Roxi Carter  MSN, APRN-CNS, [facility]  Palliative Medicine    Time in minutes:70    More than 50% of this interaction was related to counseling and information given r/t disease process; plan of care; and code status. Discussed patient and the plan of care with the other IDT members of Palliative Care Team as well as with patient, family and staff nurse.      Thank you for allowing Palliative Medicine to participate in this patient's care.

## 2019-05-01 NOTE — H&P
[address]                  [address] [facility],  [facility]                              HISTORY AND PHYSICAL    PATIENT NAME: Maria Esther CARABALLO                     :        1932  MED REC NO:   [id number]                            ROOM:       6411  ACCOUNT NO:   [de-identified]                           ADMIT DATE: 2019  PROVIDER:     Fernando Arriola DO    CHIEF COMPLAINT:  Shortness of breath. HISTORY OF PRESENT ILLNESS:  The patient is an 20-year-old   male who presented to the emergency room from [facility]  with increasing shortness of breath. He was felt to have aspiration  pneumonia. He was admitted for further evaluation and treatment. He  was recently hospitalized for CVA. He did have oropharyngeal dysphagia,  was on a modified texture diet. The patient and family had declined  notion of PEG tube on last admission. PAST MEDICAL HISTORY:  CVA, coronary artery disease, oropharyngeal  dysphagia, carotid stenosis, aortic aneurysm, BPH, hypertension,  elevated cholesterol, NIDDM, glaucoma. PAST SURGICAL HISTORY:  CABG, appendectomy, bilateral eye cataract  surgery. SOCIAL HISTORY:  The patient quit tobacco . Occasional wine in the  past.    PRIMARY CARE PHYSICIAN:  Zeferino Flores DO    MEDICATIONS PRIOR TO ADMISSION:  Tylenol p.r.n., Altace, Pulmozyme,  aspirin, nitroglycerin sublingual p.r.n., Milk of Magnesium p.r.n.,  Proventil nebulizer, Lopressor, Plavix, Lipitor, Colace, Proscar, ICAPS,  Flomax, Azopt eyedrops, Lumigan eyedrops, Combigan eyedrops, Allegra,  Augmentin. PHYSICAL EXAMINATION:  GENERAL APPEARANCE:  Physical exam reveals an 20-year-old  male  who is alert, cooperative and a poor historian. VITAL SIGNS:  On admission, as per emergency room record, which are  notable for temperature 99.1, pulse 127, respirations 28, blood pressure  187/85.   HEAD:  Normocephalic, atraumatic. EYES:  Pupils equal and reactive to light. Extraocular muscles intact. Fundi not well visualized. NOSE:  No obstruction, polyp or discharge noted. MOUTH:  Mucosa without lesion. PHARYNX:  Noninjected without exudate. NECK:  Supple. No JVD. No thyromegaly. No carotid bruits. HEART:  Regular rate and rhythm with grade 1/6 systolic murmur. LUNGS:  With minimal diffuse rhonchi. ABDOMEN:  Positive bowel sounds, soft, nontender. No rebound or  guarding. No hepatosplenomegaly. No masses. BACK:  With increased thoracic kyphosis. EXTREMITIES:  There is decreased strength in the left upper extremity. There is minimal decreased strength in left lower extremity. LYMPH NODES:  No adenopathy noted. SKIN:  Without rash or lesion. IMPRESSION:  Aspiration pneumonia, oropharyngeal dysphagia, recent CVA,  carotid stenosis, hypertension, hyperlipidemia, NIDDM, BPH, glaucoma,  coronary artery disease status post coronary artery bypass graft. PLAN:  Admit. ID and Pulmonary to see. Empiric antibiotics. Swallow  eval.  Will need to readdress the possibly the need for PEG tube based  on swallow eval.  Discharge plan, to ECF when stable.         Joe Interiano DO    D: 05/01/2019 9:15:52       T: 05/01/2019 9:23:04     [email or web address]  Job#: [id number]     Doc#: [id number]    CC:

## 2019-05-01 NOTE — PLAN OF CARE
Problem: Falls - Risk of:  Goal: Will remain free from falls  Description  Will remain free from falls  Outcome: Met This Shift     Problem: Airway Clearance - Ineffective:  Goal: Clear lung sounds  Description  Clear lung sounds  Outcome: Not Met This Shift

## 2019-05-01 NOTE — CONSULTS
[facility]     Consult Note        Date:   5/1/2019  Patient name:  Yesi Reveles  Date of admission:  4/30/2019  4:59 PM  MRN:   [id number]  YOB: 1932    Reason for Consult:  Aspiration PNA, yeast in urine     Consulting Physician:  Anthony Mead DO     CC:   Chief Complaint   Patient presents with    Shortness of Breath     being treated for pneumonia, had labored breathing this a.m. and decreased O2 sat, was in the 80s on 4L       HISTORY OF PRESENT ILLNESS:                The patient is a 80 y.o.  male with hx of subacute CVA with residual dysphagia, expressive aphasia and L side hemiparesis from rehab, hx of CABG on DAPT who is admitted to the hospital for worsening sob, tachypnec, CXR showed worsening infiltrates on L mid to lower region, concerning for aspiration PNA, UA also pos for yeast, reason for consult. Patient was treated for aspiration pneumonia recently, no intubation, discharged at 4/26 on 4 L NC  He was initiated vanc and zosyn at ER  While evaluate him this morning, patient appeared very drowsy and sleepy, poor historian, follow commands on RUE and RLE, PT was working with him on standing with assistance. Tmax 100.4 over last 24 hrs, require 6 L O2, RR>25, WBC trending up to 15.4  Of note, he had partial prostatomy 2 weeks ago, he was on urinary catheter since 4/21    Past Medical History:   has a past medical history of AAA (abdominal aortic aneurysm) ([facility] CAD (coronary artery disease), CVA (cerebral vascular accident) ([facility] Diabetes ([facility] Glaucoma, High blood sugar, Hyperlipidemia, and Hypertension. Past Surgical History:   has a past surgical history that includes Appendectomy; Coronary artery bypass graft; Cardiac surgery; Lumbar spine surgery; back surgery (2008); Colonoscopy; eye surgery; skin biopsy; and vascular surgery. Home Medications:    Prior to Admission medications    Medication Sig Start Date End Date Taking?  Authorizing Provider acetaminophen (TYLENOL) 325 MG tablet Take 650 mg by mouth every 6 hours as needed for Pain or Fever   Yes Historical Provider, MD   ramipril (ALTACE) 5 MG capsule Take 1 capsule by mouth daily 4/27/19  Yes Timo Fabian DO   dornase alpha (PULMOZYME) 1 MG/ML nebulizer solution Inhale 2.5 mg into the lungs daily 4/26/19  Yes Be Munoz MD   aspirin 81 MG EC tablet Take 1 tablet by mouth daily 4/25/19  Yes Timo Fabian, DO   nitroGLYCERIN (NITROSTAT) 0.4 MG SL tablet up to max of 3 total doses. If no relief after 1 dose, call 911. 4/25/19  Yes Timo Fabian DO   magnesium hydroxide (MILK OF MAGNESIA) 400 MG/5ML suspension Take 30 mLs by mouth daily as needed for Constipation 4/25/19  Yes Timo Fabian, DO   albuterol (PROVENTIL) (2.5 MG/3ML) 0.083% nebulizer solution Take 3 mLs by nebulization every 4 hours (while awake) 4/25/19  Yes Be Munoz MD   metoprolol tartrate (LOPRESSOR) 50 MG tablet TAKE 1 TABLET BY MOUTH TWICE DAILY 4/1/19  Yes Nanda Lobato,    clopidogrel (PLAVIX) 75 MG tablet Take 1 tablet by mouth daily 11/21/18 4/30/19 Yes Rose Blood, DO   atorvastatin (LIPITOR) 80 MG tablet Take 1 tablet by mouth daily 10/15/18  Yes Nanda Lobato DO   docusate sodium (COLACE) 100 MG capsule Take 200 mg by mouth nightly   Yes Historical Provider, MD   finasteride (PROSCAR) 5 MG tablet TK 1 T PO D 11/16/16  Yes Historical Provider, MD   Specialty Vitamins Products (ICAPS LUTEIN-ZEAXANTHIN) TBCR Take by mouth   Yes Historical Provider, MD   tamsulosin (FLOMAX) 0.4 MG capsule Take 0.4 mg by mouth 2 times daily    Yes Historical Provider, MD   brinzolamide (AZOPT) 1 % ophthalmic suspension 1 drop 2 times daily    Yes Historical Provider, MD   bimatoprost (LUMIGAN) 0.01 % SOLN ophthalmic drops Place 1 drop into both eyes nightly. Yes Historical Provider, MD   brimonidine-timolol (COMBIGAN) 0.2-0.5 % ophthalmic solution Place 1 drop into both eyes every 12 hours.    Yes Historical Provider, MD       Allergies:  Patient has no known allergies. Social History:   reports that he quit smoking about 29 years ago. His smoking use included cigarettes. He smoked 1.00 pack per day. He has never used smokeless tobacco. He reports that he drinks alcohol. He reports that he does not use drugs. Family History: family history includes Cancer in his sister; Cirrhosis in his brother; Kidney Disease in his mother. no for premature CAD. No for h/o sudden cardiac death    REVIEW OF SYSTEMS:    10 ROS otherwise negative unless otherwise specified in the HPI    PHYSICAL EXAM:    BP (!) 156/89   Pulse 92   Temp 98.7 °F (37.1 °C) (Temporal)   Resp (!) 33   Ht 5' 11\" (1.803 m)   Wt 156 lb (70.8 kg)   SpO2 97%   BMI 21.76 kg/m²    General appearance: Alert, Awake, Oriented times 3, no distress  Skin: Warm and dry  Eyes: Pink palpebral conjunctivae. PERRL  Ears: External ears normal. No tragal tenderness. Nose/Sinuses: Nares normal. Septum midline. Mucosa normal. No sinus tenderness. Oropharynx: Oropharynx clear with no exudates seen  Neck: Neck supple. No jugular venous distension, lymphadenopathy or thyromegaly Trachea midline  Lungs: decrease lung sounds at bilateral basilar area, L>R  Heart: S1 S2  Regular rate and rhythm. No rub, murmur or gallop  Abdomen: Abdomen soft, non-tender. BS normal. No masses, organomegaly  Extremities: No edema, Peripheral pulses palpable  Musculoskeletal: Muscular strength appears intact.  No joint effusion, tenderness, swelling or warmth      DATA:    Labs:   CBC:   Recent Labs     04/30/19  1740   WBC 12.1*   HGB 11.6*   HCT 37.1        BMP:   Recent Labs     04/30/19  1740   *   K 4.8   CO2 31*   BUN 11   CREATININE 1.0   LABGLOM >60   GLUCOSE 196*     CARDIAC ENZYMES:  Recent Labs     04/30/19  1740   TROPONINI 0.05*     FASTING LIPID PANEL:  Lab Results   Component Value Date    HDL 28 04/26/2019    LDLCALC 39 04/26/2019    TRIG 119 07/11/2018 Assessment and Plan:      Sepsis  R L pna  R/o acute prostatitis- he had prostate surgery 2 weeks ago- now showing yeast in urine, has indwelling lagos draining well  CKD    Plan:    · Obtain urine culture , f/u BC  · Started linezolid(Cr inc to 1.2), cefepime and ADD diflucan  · Follow daily CBC      Discussed with Dr. Amrit Mendez MD    I had a face to face encounter with the patient at the bedside with physical examination. Discussed dickey clinical findings with the resident and I agree with the resident's assessment and plan detailed in the note.

## 2019-05-01 NOTE — CONSULTS
Inpatient consult to Pulmonology  Consult performed by: Lupe Ferraro, APRN - CNP  Consult ordered by: DO Cherelle Celis M.D.,[facility]  Lilian Russell D.O., F.A.SEDA.O.I., Blayne Stacy M.D. Emi Betancourt M.D., Amrond Gonzalez M.D. Patient:  Ravi Crespo 80 y.o. male MRN: [id number]     Date of Service: 5/1/2019      PULMONARY CONSULTATION    Reason for Consultation: Acute respiratory failure with hypoxia, pneumonia  Referring Physician: Dr. Gabriel Bach D.O. Communication with the referring physician will be sent via the electronic medical record. Chief Complaint: Cough, shortness of breath    CODE STATUS: Full    SUBJECTIVE:  HPI: Mr. Obed mckenzie is an 77-year-old  male who we are asked to see in consultation today for acute respiratory failure with hypoxia, and pneumonia. He is a well-known patient to us from his last hospital stay. He was recently discharged on 4/26/19. We followed him for aspiration pneumonia. She completed antibiotics Unasyn. He completed a video swallow study at that time, positive for aspiration of thin liquids, which recommended modified thickened liquids. Patient and family were not interested at that time in pursuing PEG tube placement. He discharged to a rehab facility and was doing okay with his breathing until a few days ago. His daughter is present at bedside and states he was not receiving his chest as therapy was ordered twice daily. Due to his stroke he has difficulty with coughing and clearing his secretions. He started feeling increasing shortness of breath with elevated respiratory rate in the 30s. He was discharged from the hospital on 2 L nasal cannula and now is requiring 6 L of oxygen nasal cannula. He has persistent low-grade temps 100.4 T-max. He has a moist cough with difficulty expectorating. He is currently working with physical therapy at the bedside and has notable left sided hemiparesis.  He is currently on DuoNeb bronchodilators. A repeat pro-calcitonin is pending. His respiratory viral panel was negative. His white blood cell count is up to 15.4. A chest x-ray was obtained yesterday that showed increasing opacity in the left mid to lower lung compatible with pneumonia. There is also a persistent opacity in the right midlung. Infectious disease has been consulted. Gino Chowdary He has been placed on Zyvox and cefepime for HCAP coverage. Past Medical History:   Diagnosis Date    [facility] [facility]     CAD (coronary artery disease)     CVA [facility]ce[facility]bral vascular accident) [facility] [address] [facility]     Diabetes ([facility] [address] [facility]     Glaucoma     High blood sugar     Hyperlipidemia     Hypertension        Past Surgical History:   Procedure Laterality Date    APPENDECTOMY      BACK SURGERY  2008    spinal stenosis    CARDIAC SURGERY      COLONOSCOPY      CORONARY ARTERY BYPASS GRAFT      EYE SURGERY      cataract surgery, laser, bilateral    LUMBAR SPINE SURGERY      SKIN BIOPSY      VASCULAR SURGERY         Family History   Problem Relation Age of Onset    Kidney Disease Mother     Cancer Sister     Cirrhosis Brother        Social History:  Smoking history: reports that he quit smoking about 29 years ago. His smoking use included cigarettes. He smoked 1.00 pack per day. He has never used smokeless tobacco. He reports that he drinks alcohol. He reports that he does not use drugs. ETOH:   reports that he drinks alcohol.     Vaccines:    Immunization History   Administered Date(s) Administered    Influenza Vaccine, unspecified formulation 10/23/2014, 10/21/2015    Influenza, High Dose (Fluzone 65 yrs and older) 11/23/2016, 11/07/2017, 10/31/2018    Pneumococcal 13-valent Conjugate ([id number]) 11/23/2016    Pneumococcal Polysaccharide ([id number]) 07/11/2018        Home Meds: Medications Prior to Admission: acetaminophen (TYLENOL) 325 MG tablet, Take 650 mg by mouth every 6 hours as needed for Pain or Fever  ramipril (ALTACE) 5 MG capsule, Take 1 capsule by mouth daily  dornase alpha (PULMOZYME) 1 MG/ML nebulizer solution, Inhale 2.5 mg into the lungs daily  aspirin 81 MG EC tablet, Take 1 tablet by mouth daily  nitroGLYCERIN (NITROSTAT) 0.4 MG SL tablet, up to max of 3 total doses. If no relief after 1 dose, call 911. magnesium hydroxide (MILK OF MAGNESIA) 400 MG/5ML suspension, Take 30 mLs by mouth daily as needed for Constipation  albuterol (PROVENTIL) (2.5 MG/3ML) 0.083% nebulizer solution, Take 3 mLs by nebulization every 4 hours (while awake)  metoprolol tartrate (LOPRESSOR) 50 MG tablet, TAKE 1 TABLET BY MOUTH TWICE DAILY  clopidogrel (PLAVIX) 75 MG tablet, Take 1 tablet by mouth daily  atorvastatin (LIPITOR) 80 MG tablet, Take 1 tablet by mouth daily  docusate sodium (COLACE) 100 MG capsule, Take 200 mg by mouth nightly  finasteride (PROSCAR) 5 MG tablet, TK 1 T PO D  Specialty Vitamins Products (ICAPS LUTEIN-ZEAXANTHIN) TBCR, Take by mouth  tamsulosin (FLOMAX) 0.4 MG capsule, Take 0.4 mg by mouth 2 times daily   brinzolamide (AZOPT) 1 % ophthalmic suspension, 1 drop 2 times daily   bimatoprost (LUMIGAN) 0.01 % SOLN ophthalmic drops, Place 1 drop into both eyes nightly. brimonidine-timolol (COMBIGAN) 0.2-0.5 % ophthalmic solution, Place 1 drop into both eyes every 12 hours.   [DISCONTINUED] fexofenadine (ALLEGRA) 180 MG tablet, Take 180 mg by mouth daily  [DISCONTINUED] amoxicillin-clavulanate (AUGMENTIN) 875-125 MG per tablet, Take 1 tablet by mouth 2 times daily for 7 days    CURRENT MEDS :  Scheduled Meds:   cefepime  1 g Intravenous Q8H    linezolid  600 mg Intravenous Q12H    fluconazole  200 mg Intravenous Q24H    ipratropium-albuterol  1 ampule Inhalation Q4H WA    aspirin  81 mg Oral Daily    atorvastatin  80 mg Oral Daily    latanoprost  1 drop Both Eyes Nightly    dorzolamide  1 drop Both Eyes BID    clopidogrel  75 mg Oral Daily    docusate sodium  200 mg Oral Nightly    finasteride 5 mg Oral Daily    metoprolol tartrate  50 mg Oral BID    ramipril  5 mg Oral Daily    tamsulosin  0.4 mg Oral Nightly    sodium chloride flush  10 mL Intravenous 2 times per day    enoxaparin  40 mg Subcutaneous Daily    brimonidine  1 drop Both Eyes BID    And    timolol  1 drop Both Eyes BID       Continuous Infusions:   sodium chloride 75 mL/hr at 05/01/19 0954       No Known Allergies    REVIEW OF SYSTEMS:  Constitutional: Denies fever, weight loss, night sweats, and fatigue  Skin: Denies pigmentation, dark lesions, and rashes   HEENT: Denies hearing loss, tinnitus, ear drainage, epistaxis, sore throat, and hoarseness. Cardiovascular: Denies palpitations, chest pain, and chest pressure. Respiratory: Denies hemoptysis, apnea, and choking. Positive for coughing and shortness of breath  Gastrointestinal: Denies nausea, vomiting, poor appetite, diarrhea, heartburn or reflux  Genitourinary: Denies dysuria, frequency, urgency or hematuria  Musculoskeletal: Denies myalgias, muscle weakness, and bone pain  Neurological: Denies dizziness, vertigo, headache, and focal weakness  Psychological: Denies anxiety and depression  Endocrine: Denies heat intolerance and cold intolerance  Hematopoietic/Lymphatic: Denies bleeding problems and blood transfusions    OBJECTIVE:   BP (!) 156/89   Pulse 92   Temp 98.7 °F (37.1 °C) (Temporal)   Resp (!) 33   Ht 5' 11\" (1.803 m)   Wt 156 lb (70.8 kg)   SpO2 97%   BMI 21.76 kg/m²   SpO2 Readings from Last 1 Encounters:   05/01/19 97%        I/O:    Intake/Output Summary (Last 24 hours) at 5/1/2019 1336  Last data filed at 5/1/2019 0745  Gross per 24 hour   Intake 559 ml   Output 900 ml   Net -341 ml       Physical Exam:  General: The patient is lying in bed comfortably without any distress.   Breathing is not labored  HEENT: Pupils are equal round and reactive to light, there are no oral lesions and no post-nasal drip   Neck: supple without adenopathy  Cardiovascular: regular rate and rhythm without murmur or gallop  Respiratory: Diminished with few basilar crackles to auscultation bilaterally without wheezing or crackles. Air entry is symmetric  Abdomen: soft, non-tender, non-distended, normal bowel sounds  Extremities: warm, no edema, no clubbing  Skin: no rash or lesion  Neurologic: CN II-XII grossly intact, no focal deficits      Imaging personally reviewed:      1. Increasing opacity in the left mid to lower lung compatible with   pneumonia. 2. Persistent lesion in the right midlung which is moderately   suspicious for underlying neoplasm and CT with contrast is recommended   when this can be accomplished.               Labs:  Lab Results   Component Value Date    WBC 15.4 05/01/2019    HGB 9.2 05/01/2019    HCT 29.7 05/01/2019    .0 05/01/2019    MCH 31.3 05/01/2019    MCHC 31.0 05/01/2019    RDW 14.6 05/01/2019     05/01/2019    MPV 11.1 05/01/2019     Lab Results   Component Value Date     05/01/2019    K 3.6 05/01/2019    K 4.4 04/21/2019     05/01/2019    CO2 26 05/01/2019    BUN 12 05/01/2019    CREATININE 1.2 05/01/2019    LABALBU 3.2 04/22/2019    CALCIUM 8.0 05/01/2019    GFRAA >60 05/01/2019    LABGLOM 57 05/01/2019     No results found for: PROTIME, INR  Recent Labs     04/30/19  1740   PROBNP 4,837*     Recent Labs     04/30/19  1740   TROPONINI 0.05*     No results for input(s): PROCAL in the last 72 hours. This SmartLink has not been configured with any valid records. Micro:  No results for input(s): CULTRESP in the last 72 hours. No results for input(s): LABGRAM in the last 72 hours. No results for input(s): LEGUR in the last 72 hours. No results for input(s): STREPNEUMAGU in the last 72 hours. No results for input(s): LP1UAG in the last 72 hours. Assessment:  1. Acute on chronic respiratory failure with hypoxemia  2. Recurrent pneumonia left lower lobe, likely aspiration  3. Sepsis  4. Leukocytosis   5.  Recent CVA

## 2019-05-01 NOTE — PROGRESS NOTES
Nutrition Assessment    Type and Reason for Visit: Initial, Positive Nutrition Screen    Nutrition Summary: Pt w/ severe malnutrition on admit AEB h/o poor po intake, weight loss, & fat/muscle wasting 2/2 dysphagia h/o CVA. Will monitor diet progression/ add magic cup BID (if glucose controlled)     Nutrition Recommendations: Advance diet per SLP recs- pureed w/ pudding thick liquids. *Pt may require supplemental EN support to restore nutritional status   Please consult if additional recs are needed     Malnutrition Assessment:  · Malnutrition Status: Meets the criteria for severe malnutrition  · Context: Acute illness or injury  · Findings of the 6 clinical characteristics of malnutrition (Minimum of 2 out of 6 clinical characteristics is required to make the diagnosis of moderate or severe Protein Calorie Malnutrition based on AND/ASPEN Guidelines):  1. Energy Intake-Less than or equal to 50% of estimated energy requirement, Greater than or equal to 7 days    2. Weight Loss-5% loss or greater, (x 2 weeks )  3. Fat Loss-Moderate subcutaneous fat loss, Orbital, Triceps  4. Muscle Loss-Moderate muscle mass loss, Temples (temporalis muscle), Clavicles (pectoralis and deltoids), Thigh (quadriceps), Calf (gastrocnemius)  5. Fluid Accumulation-No significant fluid accumulation  6.  Strength-Not measured    Nutrition Risk Level:  Moderate    Nutrient Needs:  · Estimated Daily Total Kcal: 7924-0959(MSJ REE 1417 x 1.3 SF)  · Estimated Daily Protein (g):  (1.3-1.5 g/kg )  · Estimated Daily Total Fluid (ml/day): 1544-9904 (1 ml/kcal )    Nutrition Diagnosis:   · Problem: Severe malnutrition, In context of acute illness or injury  · Etiology: related to Difficulty swallowing     Signs and symptoms:  as evidenced by NPO status due to medical condition, Diet history of poor intake, Weight loss, Moderate loss of subcutaneous fat, Moderate muscle loss    Objective Information:  · Nutrition-Focused Physical Findings: Pt disoriented, SOB PTA, fluid bal WNL, no edema, weakness, boggy B/L heels, active BS, dysphagia      · Wound Type: None     · Current Nutrition Therapies:  · Oral Diet Orders: NPO     · Anthropometric Measures:  · Ht: 5' 11\" (180.3 cm)   · Current Body Wt: 156 lb (70.8 kg)(5/1 bedscale)  · Admission Body Wt: 158 lb (71.7 kg)(4/30 first measured )  · Usual Body Wt: 170 lb (77.1 kg)(4/20/19 EMR actual)  · % Weight Change: ~15lb (8%) wt loss x ~2 weeks   · Ideal Body Wt: 172 lb (78 kg), % Ideal Body 91%  · BMI Classification: BMI 18.5 - 24.9 Normal Weight    Nutrition Interventions:   Continued Inpatient Monitoring, Education not appropriate at this time, Coordination of Care    Nutrition Evaluation:   · Evaluation: Goals set   · Goals: Nutrition Progression     · Monitoring: Nutrition Progression, Skin Integrity, I&O, Mental Status/Confusion, Weight, Monitor Bowel Function, Pertinent Labs, Chewing/Swallowing      Electronically signed by Anh Guerra RD, LD on 5/1/19 at 1:53 PM    Contact Number: Ext [phone]

## 2019-05-02 NOTE — CARE COORDINATION
Care Coordination:  Back door for [facility] referral-- meets criteria, but needs  3 day under medicare. Awaiting return from bronch to discuss options and choice family.      Lane Nuckolls

## 2019-05-02 NOTE — ANESTHESIA PRE PROCEDURE
tamsulosin (FLOMAX) 0.4 MG capsule Take 0.4 mg by mouth 2 times daily    Yes Historical Provider, MD   brinzolamide (AZOPT) 1 % ophthalmic suspension 1 drop 2 times daily    Yes Historical Provider, MD   bimatoprost (LUMIGAN) 0.01 % SOLN ophthalmic drops Place 1 drop into both eyes nightly. Yes Historical Provider, MD   brimonidine-timolol (COMBIGAN) 0.2-0.5 % ophthalmic solution Place 1 drop into both eyes every 12 hours.    Yes Historical Provider, MD       Current medications:    Current Facility-Administered Medications   Medication Dose Route Frequency Provider Last Rate Last Dose    acetaminophen (TYLENOL) suppository 650 mg  650 mg Rectal Q4H PRN Paula Romero DO        dornase alpha (PULMOZYME) nebulizer solution 2.5 mg  2.5 mg Inhalation BID Devera Burkitt, APRN - CNP        albuterol (PROVENTIL) nebulizer solution 2.5 mg  2.5 mg Nebulization Q4H Devera Burkitt, APRN - CNP        dextrose 5 % and 0.45 % NaCl with KCl 20 mEq infusion   Intravenous Continuous Paula Romeor DO        potassium chloride 10 mEq/100 mL IVPB (Peripheral Line)  10 mEq Intravenous Q1H Juve Romero DO        cefepime (MAXIPIME) 1 g IVPB extended (mini-bag)  1 g Intravenous Q8H Bruce Marti MD   Stopped at 05/02/19 0930    linezolid (ZYVOX) IVPB 600 mg  600 mg Intravenous Q12H Bruce Marti MD   Stopped at 05/02/19 0225    fluconazole (DIFLUCAN) in 0.9 % sodium chloride IVPB 200 mg  200 mg Intravenous Q24H Bruce Marti  mL/hr at 05/01/19 1311 200 mg at 05/01/19 1311    guaiFENesin tablet 400 mg  400 mg Oral TID Devera Burkitt, APRN - CNP   400 mg at 05/01/19 2103    chlorhexidine (PERIDEX) 0.12 % solution 15 mL  15 mL Mouth/Throat BID Raheem Antoine MD   15 mL at 05/02/19 1037    bisacodyl (DULCOLAX) suppository 10 mg  10 mg Rectal Daily PRN Paula Romero DO   10 mg at 05/01/19 1735    acetaminophen (TYLENOL) tablet 650 mg  650 mg Oral Q6H PRN Paula Romero DO        aspirin EC tablet 81 mg  81 mg Oral Daily Maritza Shell Malmer, DO        atorvastatin (LIPITOR) tablet 80 mg  80 mg Oral Daily Maritza Shell Malmer, DO        latanoprost (XALATAN) 0.005 % ophthalmic solution 1 drop  1 drop Both Eyes Nightly Maritza Shell Malmer, DO   1 drop at 05/01/19 2104    dorzolamide (TRUSOPT) 2 % ophthalmic solution 1 drop  1 drop Both Eyes BID Maritza Shell Malmer, DO   1 drop at 05/02/19 1038    clopidogrel (PLAVIX) tablet 75 mg  75 mg Oral Daily Maritza Shell Malmer, DO        docusate sodium (COLACE) capsule 200 mg  200 mg Oral Nightly Maritza Shell Malmer, DO   200 mg at 05/01/19 2103    finasteride (PROSCAR) tablet 5 mg  5 mg Oral Daily Maritza Shell Malmer, DO        metoprolol tartrate (LOPRESSOR) tablet 50 mg  50 mg Oral BID Maritza Shell Malmer, DO   50 mg at 05/01/19 2103    ramipril (ALTACE) capsule 5 mg  5 mg Oral Daily Maritza Shell Malmer, DO        tamsulosin [facility]) capsule 0.4 mg  0.4 mg Oral Nightly Maritza Shell Malmer, DO   0.4 mg at 05/01/19 2103    sodium chloride flush 0.9 % injection 10 mL  10 mL Intravenous 2 times per day Maritza Shell Malmer, DO   10 mL at 05/02/19 1057    sodium chloride flush 0.9 % injection 10 mL  10 mL Intravenous PRN Maritza Shell Malmer, DO        magnesium hydroxide (MILK OF MAGNESIA) 400 MG/5ML suspension 30 mL  30 mL Oral Daily PRN Maritza Shell Malmer, DO        [facility]etron [facility]) injection 4 mg  4 mg Intravenous Q6H PRN Maritza Shell Malmer, DO        enoxaparin (LOVENOX) injection 40 mg  40 mg Subcutaneous Daily Maritza Shell Malmer, DO   40 mg at 05/01/19 0954    brimonidine (ALPHAGAN) 0.2 % ophthalmic solution 1 drop  1 drop Both Eyes BID Maritza Shell Malmer, DO   1 drop at 05/02/19 1038    And    timolol (TIMOPTIC) 0.5 % ophthalmic solution 1 drop  1 drop Both Eyes BID Maritza Shell Malmer, DO   1 drop at 05/02/19 1038       Allergies:  No Known Allergies    Problem List:    Patient Active Problem List   Diagnosis Code    Diabetes mellitus without complication ([facility] [address] I62.7    Hyperlipidemia E78.5    Essential hypertension, benign I10    CAD (coronary artery disease) I25.10    Asymptomatic bilateral carotid artery stenosis I65.23    Acute CVA (cerebrovascular accident) ([facility] [address] I63.9    Acute cerebrovascular accident (CVA) ([facility] [address] I63.9    CVA (cerebral vascular accident) ([facility] [address] I63.9    Community acquired pneumonia of left lower lobe of lung ([facility] [address] J18.1    Fall W19. 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.1    SVT (supraventricular tachycardia) ([facility]) I47.1    Oropharyngeal dysphagia R13.12    Bilateral carotid artery stenosis I65.23    Lethargy R53.83    Sepsis ([facility]) A41.9    Severe protein-calorie malnutrition ([facility] [address] E43    Pneumonia due to organism J18.9    Encounter for palliative care Z51.5       Past Medical History:        Diagnosis Date    AAA (abdominal aortic aneurysm) ([facility])     CAD (coronary artery disease)     CVA (cerebral vascular accident) ([facility] [address]     Diabetes ([facility] [address]     Glaucoma     High blood sugar     Hyperlipidemia     Hypertension        Past Surgical History:        Procedure Laterality Date    APPENDECTOMY      BACK SURGERY  2008    spinal stenosis    CARDIAC SURGERY      COLONOSCOPY      CORONARY ARTERY BYPASS GRAFT      EYE SURGERY      cataract surgery, laser, bilateral    LUMBAR SPINE SURGERY      SKIN BIOPSY      VASCULAR SURGERY         Social History:    Social History     Tobacco Use    Smoking status: Former Smoker     Packs/day: 1.00     Types: Cigarettes     Last attempt to quit: 1989     Years since quittin.7    Smokeless tobacco: Never Used    Tobacco comment: quit in    Substance Use Topics    Alcohol use:  Yes Comment: occassional glass of  wine                                Counseling given: Not Answered  Comment: quit in 1989      Vital Signs (Current):   Vitals:    05/02/19 0832 05/02/19 0837 05/02/19 1006 05/02/19 1030   BP:    (!) 140/60   Pulse:    97   Resp:    (!) 40   Temp:   36.6 °C (97.9 °F) 37.2 °C (99 °F)   TempSrc:   Oral Temporal   SpO2: 93% 99%     Weight:       Height:                                                  BP Readings from Last 3 Encounters:   05/02/19 (!) 140/60   04/26/19 (!) 162/60   04/23/19 (!) 142/68       NPO Status: Time of last liquid consumption: 0700                        Time of last solid consumption: 1000                        Date of last liquid consumption: 05/02/19                        Date of last solid food consumption: 05/01/19    BMI:   Wt Readings from Last 3 Encounters:   05/02/19 161 lb (73 kg)   04/26/19 167 lb (75.8 kg)   04/23/19 170 lb 8 oz (77.3 kg)     Body mass index is 22.45 kg/m². CBC:   Lab Results   Component Value Date    WBC 13.2 05/02/2019    RBC 2.85 05/02/2019    HGB 8.9 05/02/2019    HCT 28.4 05/02/2019    MCV 99.6 05/02/2019    RDW 14.5 05/02/2019     05/02/2019       CMP:   Lab Results   Component Value Date     05/02/2019    K 3.1 05/02/2019    K 4.4 04/21/2019     05/02/2019    CO2 30 05/02/2019    BUN 15 05/02/2019    CREATININE 1.3 05/02/2019    GFRAA >60 05/02/2019    LABGLOM 52 05/02/2019    GLUCOSE 140 05/02/2019    PROT 6.4 04/22/2019    CALCIUM 8.2 05/02/2019    BILITOT 0.5 04/22/2019    ALKPHOS 182 04/22/2019    AST 33 04/22/2019    ALT 23 04/22/2019       POC Tests: No results for input(s): POCGLU, POCNA, POCK, POCCL, POCBUN, POCHEMO, POCHCT in the last 72 hours. Coags: No results found for: PROTIME, INR, APTT    HCG (If Applicable): No results found for: PREGTESTUR, PREGSERUM, HCG, HCGQUANT     ABGs: No results found for: PHART, PO2ART, AZS2ARW, ICU7ETD, BEART, V4SVLULD     Type & Screen (If Applicable):   No the left base and right midlung is relatively stable and there is calcific pleural plaque on the left which suggests possible prior asbestos exposure. The cardiac mediastinal silhouette is stable. The pulmonary vascularity is mildly congested. Somewhat increasing infiltrate and/or atelectasis at the right base. Underlying congestive failure as before. Additional observations as outlined above. Ct Head Wo Contrast 4/21/2019  Findings: Moderate diffuse bilateral cortical and central atrophic changes are again noted and remain stable. Moderately severe patchy low-attenuation change in the centrum semiovale and corona radiata regions bilaterally also remain symmetric in appearance and stable, compatible with gliosis on the basis of microangiopathy. Is no evidence of intra-axial or extra-axial mass lesion. There is no evidence of cerebral hemorrhage or edema. No extra-axial collection is identified. Extensive involutional changes remain stable when compared with last study of June 20, 2018. Anesthesia Evaluation  Patient summary reviewed and Nursing notes reviewed no history of anesthetic complications:   Airway: Mallampati: III  TM distance: >3 FB   Neck ROM: limited  Mouth opening: > = 3 FB Dental:          Pulmonary:   (+) pneumonia (aspiration pneumonia): unresolved,  COPD:  decreased breath sounds,  asthma:                           ROS comment: Pt c/o thick secretions w/ difficulty producing. Pt is on 2L of humidified O2 via NC at time of exam.     Former smoker. Quit in 1989. Prior to that smoked 1ppd for 40 yrs. Cardiovascular:    (+) hypertension:, CAD:, CABG/stent (4 stents most recent 2012.  bypass surgery in 1991 x4 vessels.):, dysrhythmias (Multifocal atrial tachycardia ): SVT and atrial fibrillation, hyperlipidemia      ECG reviewed  Rhythm: regular  Rate: normal                    Neuro/Psych:   (+) CVA (CVA 4/2019, speech delays  ): residual symptoms,              ROS comment: Generalized weakness GI/Hepatic/Renal:            ROS comment: Oropharyngeal dysphagia. Endo/Other:    (+) DiabetesType II DM, using insulin, blood dyscrasia: thrombocytopenia, arthritis: OA., .                 Abdominal:       Abdomen: soft. Vascular:           ROS comment: Abdominal aortic aneurysm (AAA) without rupture   Asymptomatic bilateral carotid artery stenosis. Anesthesia Plan      MAC     ASA 3       Induction: intravenous. Anesthetic plan and risks discussed with patient. Use of blood products discussed with patient whom consented to blood products. Plan discussed with attending and CRNA.                   Marc Schaeffer RN, ALL  5/2/2019

## 2019-05-02 NOTE — H&P
Melinda Geller M.D.,[facility]  Michael Butler D.O., F.A.C.O.I., Shaniqua Clement M.D. Fifi Anderson M.D., Danielle Davalos M.D. Daily Pulmonary Progress Note    Patient:  Saravanan Crespo 80 y.o. male MRN: [id number]     Date of Service: 5/2/2019      Synopsis     We are following patient for acute respiratory failure with hypoxia , recurrent PNA LLL    \"CC\" cough, shortness of breath    Code status:   Question Answer Comment   Intubation/Re-intubation No    Defibrillation/Cardioversion No    Chest Compressions No    Resuscitative Medications Yes    Other ok for NIV            Subjective      Patient was seen and examined. He is lying in bed, events of last HS reviewed. Oxygen requirements increased to 15 L. CXR this am demonstrating L lung collapse, mucus plugging. To keep off left side. Increased pulmonary toilet, chest vest Q 4 hrs, pulmozyme added. NPO for bronchoscopy at 1 PM with Dr Rohnda Espitia today. He is now in A fib with controlled rate 90's. He is not using accessory muscles, no tachypnea. He has general weakness, left sided paresis from previous stroke. He is awake and conversing. +weak cough, difficulty mobilizing secretions. Review of Systems:  Constitutional: Denies fever, weight loss, night sweats, and fatigue  Skin: Denies pigmentation, dark lesions, and rashes   HEENT: Denies hearing loss, tinnitus, ear drainage, epistaxis, sore throat, and hoarseness. Cardiovascular: Denies palpitations, chest pain, and chest pressure. Respiratory: Denies hemoptysis, apnea, and choking.   Gastrointestinal: Denies nausea, vomiting, poor appetite, diarrhea, heartburn or reflux  Genitourinary: Denies dysuria, frequency, urgency or hematuria  Musculoskeletal: Denies myalgias, muscle weakness, and bone pain  Neurological: Denies dizziness, vertigo, headache, and focal weakness  Psychological: Denies anxiety and depression  Endocrine: Denies heat intolerance and cold intolerance  Hematopoietic/Lymphatic: Denies bleeding problems and blood transfusions    24-hour events:    Left lung mucus plugging     Objective   Vitals: BP (!) 140/60   Pulse 97   Temp 99 °F (37.2 °C) (Temporal)   Resp (!) 40   Ht 5' 11\" (1.803 m)   Wt 161 lb (73 kg)   SpO2 99%   BMI 22.45 kg/m²     I/O:    Intake/Output Summary (Last 24 hours) at 5/2/2019 1252  Last data filed at 5/2/2019 0800  Gross per 24 hour   Intake 2800 ml   Output 2025 ml   Net 775 ml         CURRENT MEDS :  Scheduled Meds:   dornase alpha  2.5 mg Inhalation BID    albuterol  2.5 mg Nebulization Q4H    potassium chloride  10 mEq Intravenous Q1H    cefepime  1 g Intravenous Q8H    linezolid  600 mg Intravenous Q12H    fluconazole  200 mg Intravenous Q24H    guaiFENesin  400 mg Oral TID    chlorhexidine  15 mL Mouth/Throat BID    aspirin  81 mg Oral Daily    atorvastatin  80 mg Oral Daily    latanoprost  1 drop Both Eyes Nightly    dorzolamide  1 drop Both Eyes BID    clopidogrel  75 mg Oral Daily    docusate sodium  200 mg Oral Nightly    finasteride  5 mg Oral Daily    metoprolol tartrate  50 mg Oral BID    ramipril  5 mg Oral Daily    tamsulosin  0.4 mg Oral Nightly    sodium chloride flush  10 mL Intravenous 2 times per day    enoxaparin  40 mg Subcutaneous Daily    brimonidine  1 drop Both Eyes BID    And    timolol  1 drop Both Eyes BID       Physical Exam:  General Appearance: appears comfortable in no acute distress. Facial asymmetry from previous CVA  HEENT: Normocephalic atraumatic without obvious abnormality   Neck: Lips, mucosa, and tongue normal.  Supple, symmetrical, trachea midline, no adenopathy;thyroid:  no enlargement/tenderness/nodules or JVD. Lung: Breath sounds left lung with diminished breath sounds throughout. Respirations   unlabored. Symmetrical expansion. Heart: irreg A fib, 12 lead EKG, HR 93, normal S1, S2. No MRG  Abdomen: Soft, NT, ND. BS present x 4 quadrants.  No bruit or organomegaly. Extremities: Pedal pulses 2+ symmetric b/l. Extremities normal, no cyanosis, clubbing, or edema. Musculokeletal: No joint swelling, no muscle tenderness. ROM normal in all joints of extremities. Left side hemiparesis  Neurologic: Mental status: Alert and Oriented X3 . Pertinent/ New Labs and Imaging Studies     Imaging Personally Reviewed:  CXR 5/2/19    Comparison: 4/30/2019       Findings: There is a stable, enlarged cardiomediastinal silhouette   with interval complete opacification of the left hemithorax. Vascular   calcifications thoracic aorta. Median sternotomy wires. Suspected   central pulmonary vascular congestion seen in the right hilar region   with airspace disease in the right lung base. Valeen Jami No pneumothorax.           Impression   ALERT:  THIS IS AN ABNORMAL REPORT   1. Interval complete opacification of the left lung/hemithorax region   suggestive for possible mucous plugging in collapse with possibility   of underlying infiltrate/pneumonia and pleural effusion also likely. 2. Stable, enlarged cardiac mediastinal silhouette with thoracic   aortic vascular calcifications and suspected mild central pulmonary   vascular congestion identified right hilar region with nonspecific   right basilar airspace disease.          ECHO    4/24/19     Summary   Left ventricle is normal in size .   Mild left ventricular concentric hypertrophy noted.   No regional wall motion abnormalities seen.   Normal left ventricular ejection fraction.   The left atrium is mildly dilated.   Mild thickening of the mitral valve leaflets.   Moderate mitral annular calcification.   Mild mitral regurgitation is present.   Mild functional mitral stenosis .   The aortic valve appears mildly sclerotic.   Mild tricuspid regurgitation      Labs:  Lab Results   Component Value Date    WBC 13.2 05/02/2019    HGB 8.9 05/02/2019    HCT 28.4 05/02/2019    MCV 99.6 05/02/2019    MCH 31.2 05/02/2019    MCHC 31.3 05/02/2019 RDW 14.5 05/02/2019     05/02/2019    MPV 11.3 05/02/2019     Lab Results   Component Value Date     05/02/2019    K 3.1 05/02/2019    K 4.4 04/21/2019     05/02/2019    CO2 30 05/02/2019    BUN 15 05/02/2019    CREATININE 1.3 05/02/2019    LABALBU 3.2 04/22/2019    CALCIUM 8.2 05/02/2019    GFRAA >60 05/02/2019    LABGLOM 52 05/02/2019     No results found for: PROTIME, INR  Recent Labs     04/30/19  1740   PROBNP 4,837*     Recent Labs     05/01/19  0957   PROCAL 0.36*     This SmartLink has not been configured with any valid records. Micro:  No results for input(s): CULTRESP in the last 72 hours. No results for input(s): LABGRAM in the last 72 hours. No results for input(s): LEGUR in the last 72 hours. No results for input(s): STREPNEUMAGU in the last 72 hours. No results for input(s): LP1UAG in the last 72 hours. Assessment:    1. Acute on chronic respiratory failure with hypoxemia  2. Recurrent pneumonia left lower lobe, likely aspiration  3. Sepsis  4. Leukocytosis   5. Recent CVA with left-sided hemiparesis  6. Dysphagia  7. Former tobacco abuse, in remission  8. Malnutrition  9. New onset A fib/NSVT  10. Hypernatremia       Plan:   1. Dicussed with daughter , will proceed with diagnostic bronchoscopy  2. Chest vest Q 4 hrs right side down, Albuterol Q 4 hrs, Pulmozyme BID. Keep off left side  3. Zyvox, Diflucan, and Cefepime per ID  4. Await cultures  5. Palliative care following  6. Modified diet for dysphagia 1 pureed diet with pudding thick liquid per speech therapy-resume after testing completed  7. Cardiology following mgmt of arrhythmia   8.  IV fluids 0.9% NS 75/hr consider switch to D5.45NS , monitor Na levels 149      Electronically signed by Brenda Tamayo MD on 5/2/2019 at 12:52 PM

## 2019-05-02 NOTE — PROGRESS NOTES
intolerance  Hematopoietic/Lymphatic: Denies bleeding problems and blood transfusions    24-hour events:    Left lung mucus plugging     Objective   Vitals: BP (!) 140/60   Pulse 97   Temp 99 °F (37.2 °C) (Temporal)   Resp (!) 40   Ht 5' 11\" (1.803 m)   Wt 161 lb (73 kg)   SpO2 99%   BMI 22.45 kg/m²     I/O:    Intake/Output Summary (Last 24 hours) at 5/2/2019 1057  Last data filed at 5/2/2019 0604  Gross per 24 hour   Intake 2680 ml   Output 2025 ml   Net 655 ml         CURRENT MEDS :  Scheduled Meds:   dornase alpha  2.5 mg Inhalation BID    albuterol  2.5 mg Nebulization Q4H    cefepime  1 g Intravenous Q8H    linezolid  600 mg Intravenous Q12H    fluconazole  200 mg Intravenous Q24H    guaiFENesin  400 mg Oral TID    chlorhexidine  15 mL Mouth/Throat BID    aspirin  81 mg Oral Daily    atorvastatin  80 mg Oral Daily    latanoprost  1 drop Both Eyes Nightly    dorzolamide  1 drop Both Eyes BID    clopidogrel  75 mg Oral Daily    docusate sodium  200 mg Oral Nightly    finasteride  5 mg Oral Daily    metoprolol tartrate  50 mg Oral BID    ramipril  5 mg Oral Daily    tamsulosin  0.4 mg Oral Nightly    sodium chloride flush  10 mL Intravenous 2 times per day    enoxaparin  40 mg Subcutaneous Daily    brimonidine  1 drop Both Eyes BID    And    timolol  1 drop Both Eyes BID       Physical Exam:  General Appearance: appears comfortable in no acute distress. Facial asymmetry from previous CVA  HEENT: Normocephalic atraumatic without obvious abnormality   Neck: Lips, mucosa, and tongue normal.  Supple, symmetrical, trachea midline, no adenopathy;thyroid:  no enlargement/tenderness/nodules or JVD. Lung: Breath sounds left lung with diminished breath sounds throughout. Respirations   unlabored. Symmetrical expansion. Heart: irreg A fib, 12 lead EKG, HR 93, normal S1, S2. No MRG  Abdomen: Soft, NT, ND. BS present x 4 quadrants. No bruit or organomegaly.    Extremities: Pedal pulses 2+ symmetric b/l. Extremities normal, no cyanosis, clubbing, or edema. Musculokeletal: No joint swelling, no muscle tenderness. ROM normal in all joints of extremities. Left side hemiparesis  Neurologic: Mental status: Alert and Oriented X3 . Pertinent/ New Labs and Imaging Studies     Imaging Personally Reviewed:  CXR 5/2/19    Comparison: 4/30/2019       Findings: There is a stable, enlarged cardiomediastinal silhouette   with interval complete opacification of the left hemithorax. Vascular   calcifications thoracic aorta. Median sternotomy wires. Suspected   central pulmonary vascular congestion seen in the right hilar region   with airspace disease in the right lung base. Du Challenger No pneumothorax.           Impression   ALERT:  THIS IS AN ABNORMAL REPORT   1. Interval complete opacification of the left lung/hemithorax region   suggestive for possible mucous plugging in collapse with possibility   of underlying infiltrate/pneumonia and pleural effusion also likely. 2. Stable, enlarged cardiac mediastinal silhouette with thoracic   aortic vascular calcifications and suspected mild central pulmonary   vascular congestion identified right hilar region with nonspecific   right basilar airspace disease.          ECHO    4/24/19     Summary   Left ventricle is normal in size .   Mild left ventricular concentric hypertrophy noted.   No regional wall motion abnormalities seen.   Normal left ventricular ejection fraction.   The left atrium is mildly dilated.   Mild thickening of the mitral valve leaflets.   Moderate mitral annular calcification.   Mild mitral regurgitation is present.   Mild functional mitral stenosis .   The aortic valve appears mildly sclerotic.   Mild tricuspid regurgitation      Labs:  Lab Results   Component Value Date    WBC 13.2 05/02/2019    HGB 8.9 05/02/2019    HCT 28.4 05/02/2019    MCV 99.6 05/02/2019    MCH 31.2 05/02/2019    MCHC 31.3 05/02/2019    RDW 14.5 05/02/2019     05/02/2019 MPV 11.3 05/02/2019     Lab Results   Component Value Date     05/02/2019    K 3.1 05/02/2019    K 4.4 04/21/2019     05/02/2019    CO2 30 05/02/2019    BUN 15 05/02/2019    CREATININE 1.3 05/02/2019    LABALBU 3.2 04/22/2019    CALCIUM 8.2 05/02/2019    GFRAA >60 05/02/2019    LABGLOM 52 05/02/2019     No results found for: PROTIME, INR  Recent Labs     04/30/19  1740   PROBNP 4,837*     Recent Labs     05/01/19  0957   PROCAL 0.36*     This SmartLink has not been configured with any valid records. Micro:  No results for input(s): CULTRESP in the last 72 hours. No results for input(s): LABGRAM in the last 72 hours. No results for input(s): LEGUR in the last 72 hours. No results for input(s): STREPNEUMAGU in the last 72 hours. No results for input(s): LP1UAG in the last 72 hours. Assessment:    1. Acute on chronic respiratory failure with hypoxemia  2. Recurrent pneumonia left lower lobe, likely aspiration  3. Sepsis  4. Leukocytosis   5. Recent CVA with left-sided hemiparesis  6. Dysphagia  7. Former tobacco abuse, in remission  8. Malnutrition  9. New onset A fib/NSVT  10. Hypernatremia       Plan:   1. Oxygen increased to 15 L HF NC keep >92%  2. Bipap 12/6 as needed for respiratory support   3. NPO for bronchoscopy today  4. Chest vest Q 4 hrs right side down, Albuterol Q 4 hrs, Pulmozyme BID. Keep off left side  5. Follow CXR in am  6. Zyvox, Diflucan, and Cefepime per ID  7. Await cultures  8. Palliative care following  9. Modified diet for dysphagia 1 pureed diet with pudding thick liquid per speech therapy-resume after testing completed  10. Cardiology following mgmt of arrhythmia   11. IV fluids 0.9% NS 75/hr consider switch to D5.45NS , monitor Na levels 149    This plan of care was reviewed in collaboration with Dr. Christiano Ibanez  Electronically signed by LEANN Miranda - CNP on 5/2/2019 at 10:57 AM      I personally saw, examined, and cared for the patient.  Labs, medications, radiographs reviewed. I agree with history exam and plans detailed in NP note.   Stephanie Gonzalez MD

## 2019-05-02 NOTE — PROGRESS NOTES
Spoke w/ Dr. Mallorie Douglass will try to get pt on the schedule to bronch the pt---orders to make pt strict NPO

## 2019-05-02 NOTE — PROGRESS NOTES
Patient seen after bronch. Daughter ad wife at bedside. Will keepNPO. Lopresor switched to IV. BD switches to Xopenax. IV fluids running. Follow CXR post bronch.     Leonel Naidu MD

## 2019-05-02 NOTE — PROGRESS NOTES
Dr. Yoder Fret on unit and notified pt had 12 beats of a tachy arrhythmia 5/1 evening and again this morning.

## 2019-05-02 NOTE — CONSULTS
Inpatient Cardiology Consultation      Reason for Consult:  NSVT    Consulting Physician: Dr Geno Rudd    Requesting Physician:  Dr Alejo Greene    Date of Consultation: 5/2/2019    HISTORY OF PRESENT ILLNESS: 79 yo  male known to Dr Alfonza Apley (seen as new patient 8/30/2018). PMH: T2DM, HTN, CABG 1/1991, TONA to RCA in 2012, CVA with L hemiparesis/dysphagia/expressive asphasia, ex smoker, BPH, aspiration pneumonia. [facility] 4/30/2019 tachypnea, hypoxia on 4 liters NC, and febrile 100.4. + UTI (yeast). + infiltrate LLL pneumonia. WBC 12.1, Hgb 11.6, , K+ 4.8, CO2 31, Bun/Cr 11/1.0, p-BNP 4837, troponin 0.05, lactic acid 1.8. EKG ST with frequent PAC's rate 127. Received 2 liters NSS and placed on ATB's and maintence IVF's  5/1/2019 DNR-CCA medications/Bi-pap ONLY. Family does NOT wait PEG tube. 5/1/2019 12 beats NSVT while receiving suppository, and 9 beats NSVT 5/2/2019      Please note: past medical records were reviewed per electronic medical record (EMR) - see detailed reports under Past Medical/ Surgical History. Past Medical/Surgical History:    1. 1 ppd x 40 years quit in 1989  2. HTN  3. HLD  4. T2DM with neuropathy  5. Anemia  6. Glaucoma  7. BPH  8. CABG NSH in 1990  9. PVD  10. TBI's R 0/83 and L 0.045 8/2014  11. Lumbar laminectomy, appendectomy  12. [facility] 7/23/2012: Patent SVG - OM1, SVG - OM2 with 70% restenosis of distal  13. SVG, SVG - distal RCA with about 95% stenosis at distal stent margin of  14. proximal graft, LIMA - LAD:  multiple 50% to 70% stenoses distal to  15. anastomosis, status post PCI of proximal SVG to distal RCA with 3.5 x 15  16. mm drug-eluting stent. 17. 6/2018 CVA--> MRI Brain  acute punctate R frontal lobe stroke  18. 6/2018 US carotids L ICA 50-69%  19. 6/2018 CTA Neck R ICA  50% stenosis. R middle cerebral artery stenosis 50% stenosis. L ICA  70% stenosis. R Vertebral patent.  L Vertebral patent  20. 6/2018 CTA chest bilateral pulmonary nodules  21. 9/2018 TTE with bubble study Dr Joie Workman: EF visually estimated at 55%. Kiko Haq I DD. Normal right ventricle structure and function. Left atrial volume index of 50 ml per meters squared BSA. Moderate MR. Mild TR. Mild PHTN. RVSP is 40 mmHg. Agitated saline injected for shunt evaluation. No evidence of patent foramen ovale. No evidence of atrial septal defect. 22. 3/8/2019 Abdominal CTA Juxtarenal AAA (5 cm), would need open repair and at his age unlikely candidate per Dr Pamela Soni. Patient declined repair at office follow up. 23. 4/2019 TTE Dr Tam Mckeon: Left ventricle is normal in size. Mild left ventricular concentric hypertrophy noted. No regional wall motion abnormalities seen. Normal left ventricular ejection fraction. The left atrium is mildly dilated. Mild thickening of the mitral valve leaflets. Moderate mitral annular calcification. Mild MR. Mild functional MS.  The aortic valve appears mildly sclerotic. Mild TR  24. 3/11/2019 treated by PCP for pneumonia placed on ATB  25. 4/17/2019 Partial Prostatectomy with indwelling lagos since 4/21/2019 ([facility])  26. [facility] 4/20/2019-4/30/2019 with weakness and fatigue (fell out of chair). /70, HR 96, 95% RA.  + CAP. Lagos with dark red coloration. Influenza negative, hgb 9.2, WBC 4.7, Plt 114, Na 140, k+ 4.3, Bun/Cr 11/1.0, troponin <0.01, p-BNP 1658, lactic acid 1.1. UA WNL. After admission noted to have aspiration with hypoxia--> video swallow aspiration thin liquids , modified diet thickened liquids at this time family and pt are not pursuing peg tube   27. 4/22/2019 Cardiology consulted for possible atrial fibrillation seen by Dr [facility] thought to be SVT/MAT and SR with frequent PAC's  28. 4/23/2019 weakness L side, aspiration,  and  L side neglect s/p MRI Brain-->acute right frontal and parietal stroke. 29. 4/24/2019 Transferred to [address] [place] [address] for neurology evaluation.   30. 4/24/2019 Evaluation by vascular surgery per Dr Alverto Encarnacion does not feel carotid stenosis is capsule Take 200 mg by mouth nightly   Yes Historical Provider, MD   finasteride (PROSCAR) 5 MG tablet TK 1 T PO D 11/16/16  Yes Historical Provider, MD   Specialty Vitamins Products (ICAPS LUTEIN-ZEAXANTHIN) TBCR Take by mouth   Yes Historical Provider, MD   tamsulosin (FLOMAX) 0.4 MG capsule Take 0.4 mg by mouth 2 times daily    Yes Historical Provider, MD   brinzolamide (AZOPT) 1 % ophthalmic suspension 1 drop 2 times daily    Yes Historical Provider, MD   bimatoprost (LUMIGAN) 0.01 % SOLN ophthalmic drops Place 1 drop into both eyes nightly. Yes Historical Provider, MD   brimonidine-timolol (COMBIGAN) 0.2-0.5 % ophthalmic solution Place 1 drop into both eyes every 12 hours.    Yes Historical Provider, MD       Current Medications:    Current Facility-Administered Medications: acetaminophen (TYLENOL) suppository 650 mg, 650 mg, Rectal, Q4H PRN  dornase alpha (PULMOZYME) nebulizer solution 2.5 mg, 2.5 mg, Inhalation, BID  albuterol (PROVENTIL) nebulizer solution 2.5 mg, 2.5 mg, Nebulization, Q4H  cefepime (MAXIPIME) 1 g IVPB extended (mini-bag), 1 g, Intravenous, Q8H  linezolid (ZYVOX) IVPB 600 mg, 600 mg, Intravenous, Q12H  fluconazole (DIFLUCAN) in 0.9 % sodium chloride IVPB 200 mg, 200 mg, Intravenous, Q24H  guaiFENesin tablet 400 mg, 400 mg, Oral, TID  chlorhexidine (PERIDEX) 0.12 % solution 15 mL, 15 mL, Mouth/Throat, BID  bisacodyl (DULCOLAX) suppository 10 mg, 10 mg, Rectal, Daily PRN  acetaminophen (TYLENOL) tablet 650 mg, 650 mg, Oral, Q6H PRN  aspirin EC tablet 81 mg, 81 mg, Oral, Daily  atorvastatin (LIPITOR) tablet 80 mg, 80 mg, Oral, Daily  latanoprost (XALATAN) 0.005 % ophthalmic solution 1 drop, 1 drop, Both Eyes, Nightly  dorzolamide (TRUSOPT) 2 % ophthalmic solution 1 drop, 1 drop, Both Eyes, BID  clopidogrel (PLAVIX) tablet 75 mg, 75 mg, Oral, Daily  docusate sodium (COLACE) capsule 200 mg, 200 mg, Oral, Nightly  finasteride (PROSCAR) tablet 5 mg, 5 mg, Oral, Daily  metoprolol tartrate weight change. .  · Hematologic/Lymphatic: Denies abnormal bruising or bleeding. No swollen lymph nodes    PHYSICAL EXAM: Per Dr Melody Marquez  BP (!) 152/96   Pulse 109   Temp 99.9 °F (37.7 °C) (Temporal)   Resp (!) 38   Ht 5' 11\" (1.803 m)   Wt 161 lb (73 kg)   SpO2 99%   BMI 22.45 kg/m²     DATA:    ECG  As per Dr Melody Marquez  Tele strips: SR-ST with frequent PAC's. NSVT    Diagnostic:  CXR 5/2/2019: Interval complete opacification of the left lung/hemithorax region  suggestive for possible mucous plugging in collapse with possibility of underlying infiltrate/pneumonia and pleural effusion also likely. Stable, enlarged cardiac mediastinal silhouette with thoracic aortic vascular calcifications and suspected mild central pulmonary vascular congestion identified right hilar region with nonspecific  right basilar airspace disease. Intake/Output Summary (Last 24 hours) at 5/2/2019 0949  Last data filed at 5/2/2019 0604  Gross per 24 hour   Intake 2680 ml   Output 2025 ml   Net 655 ml       Labs:   CBC:   Recent Labs     05/01/19  0957 05/02/19  0550   WBC 15.4* 13.2*   HGB 9.2* 8.9*   HCT 29.7* 28.4*    133     BMP:   Recent Labs     04/30/19  1740 05/01/19  0957   * 146   K 4.8 3.6   CO2 31* 26   BUN 11 12   CREATININE 1.0 1.2   LABGLOM >60 57   CALCIUM 8.5* 8.0*     HgA1c:   Lab Results   Component Value Date    LABA1C 5.2 04/26/2019     proBNP:   Recent Labs     04/30/19  1740   PROBNP 4,837*     CARDIAC ENZYMES:  Recent Labs     04/30/19  1740   TROPONINI 0.05*     FASTING LIPID PANEL:  Lab Results   Component Value Date    CHOL 108 07/11/2018    HDL 28 04/26/2019    LDLCALC 39 04/26/2019    TRIG 119 07/11/2018   PE, A&P per Dr Melody Marquez  Electronically signed by Jelani Woods. BELA Villanueva on 5/2/2019 at 9:49 AM     Reason for consult:  NSVT     Patient seen with Kayy Carver. Agree with the findings and A/P. Management plan was discussed.  I have personally interviewed the patient, independently 6. CAD s/p CABG followed by PCI later. No anginal complaints. On ASA, BB.   7. HTN        Plan:  1. Continue cardiac meds  2. Had recent echo. No need to repeat  3. Hydrate for SERGIO  4. For bronch today  5. Continue BB.               Thank you for the consult. Will follow.     Dr. Thierno Roberson MD.  [facility].

## 2019-05-02 NOTE — PROCEDURES
Bronchoscopy Inpatient Procedure Note    Date of Procedure: 5/2/2019    Pre-op Diagnosis: Complete Left lung opacification    Post-op Diagnosis: Same, thick purulent mucous plug completely obstructing the left main stem    Bronchoscopist: Nikhil Sifuentes      Anesthesia: Please see CRNA note    Procedure: Flexible fiberoptic bronchoscopy    Estimated Blood Loss: Minimal    Complications: None    Indications and History  T    (Please see today's H&P for the latest issues,  physical exam and lab data)    Consent to Procedure  The risks, benefits, complications, treatment options and expected outcomes were discussed with the patient and/or POA  The possibilities of reaction to medication, pulmonary aspiration, perforation of a viscus, bleeding, failure to diagnose a condition and creating a complication requiring transfusion or operation were discussed with the patient who freely signed the consent. Description of Procedure  The patient was intubated on mechanical ventilation and placed on 100% oxygen. John Andrea was monitored by CRNA. Gui [employer] and the procedure were verified as Flexible Fiberoptic Bronchoscopy. A Time Out was held and the above information confirmed. The patient was placed in the appropriate position. The patient was sedated using propofol intravenously    The bronchoscope was then passed into the trachea via oropharynx . After careful inspection of the tracheal, the bronchoscope was sequentially passed into all segments of the left and right endobronchial trees to the second and/or third divisions. Endobronchial findings    Vocal Cords were abnormal with a left vocal cord was not completely abducting  Trachea: No tracheal stenosis.   Edematous mucosa  [place]  Edematous mucosa    Right Main Stem Bronchus  Edematous mucosa  Right Upper Lobe Bronchi Edematous mucosa  Right Middle Lobe Bronchi  Edematous mucosa  Right Lower Lobe Bronchi (including the Superior segment)  Edematous mucosa    Left Main Stem Bronchus Collapse with mucus plugging  Left Upper Lobe Bronchus, Upper Division Collapse with mucus plugging  Left Upper Lobe Bronchus, Lingula  Edematous mucosa and Collapse with mucus plugging  Left Lower Lobe Bronchus (including the Superior segment)  Edematous mucosa and Collapse with mucus plugging  After clearing the airways with multiple washings the mucous membrane was found to be severely edematous.     Specimens Taken    Washings -  Amount - 20cc   Location - left mainstem      Aaron Gilliam MD

## 2019-05-02 NOTE — PLAN OF CARE
Ngoc Gonzales is feeling better this shift and remains oriented x's 2 but is having clear moments more often.

## 2019-05-02 NOTE — PROGRESS NOTES
Palliative Medicine   Progress Note    Provider: Fermin Brantley RN, CNS, [facility] Day: 3    Recommendations:  1. Psychosocial support of patient and family: Met daughter Elodia Au at bedside  2. Assist with goals of care: continue current care; plan for bronch today; if pt declines- would consider likely hospice at home  3. Advance Care Planning: has DPOA for health care on chart; names Elodia Au dated/timed 1-9-95  4. Anticipatory Guidance: 80 yr old with recent stroke; now with likely aspiration pneumonia and sepsis  5. Code Status: limited- no CPR; no shock; no intubation; meds only; ok for NIV [facility]- intubation)  6. Symptom Management- no acute distress    Chief complaint: SOB    HPI:   Bao Barrios is a 80 y.o. male with significant past medical history of AAA, CAD (s/p CABG-on Plavix and ASA), BPH,  Recent CVA (left sided weakness), Diabetes, Glaucoma, and Hypertension who presented from [facility] to ED for shortness of breath which progressively worsened. Patient and family reports he was just discharged from here to a nursing home (for rehab) 1 week ago for pneumonia and stroke. His left sided weakness was improving but SOB was worsening; he became tachypneic; requiring increased supplemental O2. He also reports increased cough Workup noted; White count slightly up 12.1; proBNP 4837; troponin 0.05; urine with blood and small amt of leukocyte esterase.; CXR noted increasing opacity in left mid/lower lung likely pneumonia; also noted is right midlung lesion suspicious for neoplasm. EKG -Afib with RBBB. Pt was started on nebulizers and IV antibiotics. Pt admitted with sepsis for further care. Pt had been in [address] [place] [address] 4/20-4/23 with pneumonia. ; had acute stroke and was transferred to [address] [place] [address]. Pulmonology saw pt at that time. Palliative Medicine has been consulted to assist pt/family with establishing goals of care; code status discussion; family support and symptom management. possibility  of underlying infiltrate/pneumonia and pleural effusion also likely. 2. Stable, enlarged cardiac mediastinal silhouette with thoracic  aortic vascular calcifications and suspected mild central pulmonary  vascular congestion identified right hilar region with nonspecific  right basilar airspace disease. Cultures:   Collected: 04/30/19 1830   Order Status: Completed Specimen: Blood Updated: 05/01/19 2235    Blood Culture, Routine 24 Hours- no growth   Narrative:             Impression  Active Problems:    Sepsis ([facility] [address]    Severe protein-calorie malnutrition ([facility] [address]    Pneumonia due to organism    Encounter for palliative care  Resolved Problems:    * No resolved hospital problems. *      Assessment:   Eulalia Beltre is a 80 y.o. male with hx of stroke d/c to nursing home recently; developed worsening SOB: found with pneumonia. Pt have increased heart rate; leukocytosis and hypoxia- was admitted; treated with antibiotics and nebulizers. 5/2 CXR noted complete white out on left; has increased work of breathing and increased oxygen demands; placed on high flow oxygen 15L. Pulmonology following; plan for bronch today. Met with daughter at bedside; updated. Family would consider hospice at home if further decline. Plan:   1. Pneumonia- continue nebulizers; oxygen; monitor; continue antibiotics; plan for bronch today; Pulmonology  2. Sepsis- continue antibiotics; ID following  3. Code status - limited- no CPR: no shock; no intubation; meds only; ok for NIV  4. Recent CVA- has left sided weakness  5. Does have DPOA/HCPOA- in place; met with daughter Barbara Farris  MSN, APRN-CNS, [facility]  Palliative Medicine    Time in minutes: 25    More than 50% of this interaction was related to counseling and information given r/t disease process; plan of care; and code status.     Discussed patient and the plan of care with the other IDT members of Palliative Care Team as well as with patient, family and staff nurse.      Thank you for allowing Palliative Medicine to participate in this patient's care.

## 2019-05-03 NOTE — PROGRESS NOTES
Palliative Medicine   Progress Note    Provider: Carlotta Ross RN, CNS, [facility] Day: 4    Recommendations:  1. Psychosocial support of patient and family: Met wife at bedside; updated  2. Assist with goals of care: continue current care; plan for [facility] on d/c  3. Anticipatory Guidance: 80 yr old with recent stroke; now with likely aspiration pneumonia and sepsis; s/p bronch; plan for [facility]  4. Code Status: limited- no CPR; no shock; no intubation; meds only; ok for NIV [facility]- intubation)      Chief complaint: SOB    HPI:   Yudelka Novak is a 80 y.o. male with significant past medical history of AAA, CAD (s/p CABG-on Plavix and ASA), BPH,  Recent CVA (left sided weakness), Diabetes, Glaucoma, and Hypertension who presented from [facility] to ED for shortness of breath which progressively worsened. Patient and family reports he was just discharged from here to a nursing home (for rehab) 1 week ago for pneumonia and stroke. His left sided weakness was improving but SOB was worsening; he became tachypneic; requiring increased supplemental O2. He also reports increased cough Workup noted; White count slightly up 12.1; proBNP 4837; troponin 0.05; urine with blood and small amt of leukocyte esterase.; CXR noted increasing opacity in left mid/lower lung likely pneumonia; also noted is right midlung lesion suspicious for neoplasm. EKG -Afib with RBBB. Pt was started on nebulizers and IV antibiotics. Pt admitted with sepsis for further care. Pt had been in [address] [place] [address] 4/20-4/23 with pneumonia. ; had acute stroke and was transferred to [address] [place] [address]. Pulmonology saw pt at that time. Palliative Medicine has been consulted to assist pt/family with establishing goals of care; code status discussion; family support and symptom management. Palliative Medicine Encounter:  Mr Fadia Garcia is laying in bed on oxygen per n/c 15L; SPO2 95%. He is oriented to person/place; follows commands. Pt had  Bronch yesterday.  Still malignancy  especially in the left lung is not excluded and surveillance is  recommended to see resolution. Mild aortic aneurysm. Impression  Active Problems:    Sepsis ([address]    Severe protein-calorie malnutrition ([address]    Pneumonia due to organism    Encounter for palliative care  Resolved Problems:    * No resolved hospital problems. *      Assessment:   Poli Melendez is a 80 y.o. male with hx of stroke d/c to nursing home recently; developed worsening SOB: found with pneumonia. Pt have increased heart rate; leukocytosis and hypoxia- was admitted; treated with antibiotics and nebulizers. 5/2 CXR noted complete white out on left; has increased work of breathing and increased oxygen demands; placed on high flow oxygen 15L. Pulmonology following; plan for bronch today. Met with daughter at bedside; updated. Family would consider hospice at home if further decline. 5/3 Pt had bronch yesterday for mucous plugging; recurrent LLL pneumonia. Pulmonology following. Plan for [facility] on d/c. Met with wife at bedside; updated/answered questions. Plan:   1. Pneumonia- continue nebulizers; oxygen; monitor; continue antibiotics; chest vest; Pulmonology following; follow cultures  2. Sepsis- continue antibiotics; ID following  3. Code status - limited- no CPR: no shock; no intubation; meds only; ok for NIV  4. Recent CVA- has left sided weakness  5. NPO- speech to re-evaluate  6. Dispo- plan for [facility] on d/c    Christine Delgado  MSN, APRN-CNS, [facility]  Palliative Medicine    Time in minutes: 25    More than 50% of this interaction was related to counseling and information given r/t disease process; plan of care; and code status. Discussed patient and the plan of care with the other IDT members of Palliative Care Team as well as with patient, family and staff nurse.      Thank you for allowing Palliative Medicine to participate in this patient's care.

## 2019-05-03 NOTE — ANESTHESIA POSTPROCEDURE EVALUATION
Department of Anesthesiology  Postprocedure Note    Patient: Marcelino Logan  MRN: [id number]  YOB: 1932  Date of evaluation: 5/3/2019  Time:  7:28 AM     Procedure Summary     Date:  05/02/19 [place]  [facility]    Anesthesia Start:  1251 Anesthesia Stop:  2015    Procedure:  BRONCHOSCOPY DIAGNOSTIC OR CELL [address] ONLY (N/A ) Diagnosis:  (mucus plugging)    Surgeon:  Alayna Fierro MD Responsible Provider:  Luis Fernando Bowen MD    Anesthesia Type:  MAC ASA Status:  3          Anesthesia Type: MAC    Shelia Phase I: Shelia Score: 9    Shelia Phase II:      Last vitals: Reviewed and per EMR flowsheets.        Anesthesia Post Evaluation    Patient location during evaluation: PACU  Patient participation: complete - patient participated  Level of consciousness: awake  Airway patency: patent  Nausea & Vomiting: no nausea and no vomiting  Complications: no  Cardiovascular status: hemodynamically stable  Respiratory status: acceptable  Hydration status: euvolemic

## 2019-05-03 NOTE — CARE COORDINATION
Care Coordination:  Spoke to Dr June Vidal, if pt and family agreeable and bed is available for ltac today, patient can dc today. Awaiting Daughter who briefly toured [facility] last evening and spoke to [employer]. Bed is available in [place] and possibly in [place] this afternoon.     Torsten Miranda

## 2019-05-03 NOTE — CARE COORDINATION
5/3/2019  Met with patient's daughter Isabelle Oneal to assist with discharge needs. Isabelle Oneal relayed they are receptive to [facility] and prefer [place]. Isabelle Oneal relayed she still wants to confirm location with family. Spoke to liaison from [facility] and he relayed patient can be admitted on the weekend if stable for discharge.

## 2019-05-03 NOTE — PROGRESS NOTES
CC- SOB    Subjective:  No fever  The patient is awake and alert. Tolerating medications. Reports no side effects. Afebrile. 10 ROS otherwise negative unless otherwise specified above. Objective:    Vitals:    05/03/19 0958   BP:    Pulse:    Resp:    Temp:    SpO2: 99%       General Appearance:    Awake, alert , no acute distress. HEENT:    Normocephalic,PERRL,neck supple, no JVD, mucosa moist, no thrush   Lungs:     Anterior bases diminished bilaterally, no wheeze , crackles   Heart:    Regular rate and rhythm, no murmur   Abdomen:     Soft, non-tender, not distended  bowel sounds present,   Extremities:   No edema,no open wound,no erythema, non  tender   Skin:   no rashes or lesions     Labs:  WBC-12    Microbiology :    Blood culture -pending    Urine Culture -[facility]    Radiology :    Chest X ray- L lung whiteout  CT chest - B/L atlectasis and LLL pneumonia, not much effusion      Assessment:     · Sepsis  · Left Lung pna,aspiration pneumonia s/p bronchsocopy on 5/2/19  · R/o acute prostatitis- he had prostate surgery 2 weeks ago- now showing yeast in urine, has indwelling lagos draining well  · SERGIO on CKD     Plan:     · Day 3 of linezolid cefepime and diflucan-plan for 5 days and stop  · Follow [facility] in UC, BAL results  · Chest PT      Family considering feeding tube maybe NGT, wife does not want peg tube. Had extensive discussion.     Electronically signed by Edison Adams MD on 5/3/2019 at 12:18 PM

## 2019-05-03 NOTE — DISCHARGE INSTR - COC
Continuity of Care Form    Patient Name: Amie Lewis   :  1932  MRN:  [id number]    Admit date:  2019  Discharge date:  ***    Code Status Order: Limited   Advance Directives:   Advance Care Flowsheet Documentation     Date/Time Healthcare Directive Type of Healthcare Directive Copy in [address] Agent's Name Healthcare Agent's Phone Number    19 2213  No, patient does not have an advance directive for healthcare treatment -- -- -- -- --          Admitting Physician:  Tawanna Kendrick DO  PCP: Ghassan Colin DO    Discharging Nurse: [facility] Unit/Room#: 9139/9004-D  Discharging Unit Phone Number: ***    Emergency Contact:   Extended Emergency Contact Information  Primary Emergency Contact: Yumiko Crespo  Address: [address] [place] [address], [address] [place] [address] Phone: [phone]  Mobile Phone: [phone]  Relation: Spouse  Secondary Emergency Contact: Sonia Camaraef [address] Phone: [phone]  Mobile Phone: [phone]  Relation: Child    Past Surgical History:  Past Surgical History:   Procedure Laterality Date    APPENDECTOMY      BACK SURGERY  2008    spinal stenosis    BRONCHOSCOPY N/A 2019    BRONCHOSCOPY DIAGNOSTIC OR CELL [address] ONLY performed by Ezio Carmichael MD at [address] [place] [address] GRAFT      EYE SURGERY      cataract surgery, laser, bilateral    LUMBAR SPINE SURGERY      SKIN BIOPSY      VASCULAR SURGERY         Immunization History:   Immunization History   Administered Date(s) Administered    Influenza Vaccine, unspecified formulation 10/23/2014, 10/21/2015    Influenza, High Dose (Fluzone 65 yrs and older) 2016, 2017, 10/31/2018    Pneumococcal 13-valent Conjugate (Ipthbvi31) 2016    Pneumococcal Polysaccharide (Dunrzqufo48) 2018       Active Problems:  Patient Active Delivery   [facility] MED Delivery:364365407}    Wound Care Documentation and Therapy:  Wound 19 Coccyx (Active)   Wound Image   5/3/2019 12:53 PM   Wound Deep tissue/Injury 5/3/2019 12:53 PM   Dressing/Treatment Open to air 2019  8:45 PM   Wound Cleansed Other (Comment) 2019 10:30 AM   Wound Length (cm) 5 cm 5/3/2019 12:53 PM   Wound Width (cm) 2.2 cm 5/3/2019 12:53 PM   Wound Surface Area (cm^2) 11 cm^2 5/3/2019 12:53 PM   Change in Wound Size % (l*w) -450 5/3/2019 12:53 PM   Wound Assessment Purple 5/3/2019 12:53 PM   Drainage Amount None 5/3/2019 12:53 PM   Odor None 2019  8:45 PM   Luh-wound Assessment Blanchable erythema 5/3/2019 12:53 PM   Red%Wound Bed 50 2019 11:15 PM   Purple%Wound Bed 50 2019 11:15 PM   Number of days: 2        Elimination:  Continence:   · Bowel: {YES / TQ:07441}  · Bladder: {YES / UE:05193}  Urinary Catheter: {Urinary Catheter:612808181}   Colostomy/Ileostomy/Ileal Conduit: {YES / XB:47775}       Date of Last BM: ***    Intake/Output Summary (Last 24 hours) at 5/3/2019 1342  Last data filed at 5/3/2019 1036  Gross per 24 hour   Intake 1794 ml   Output 1325 ml   Net 469 ml     I/O last 3 completed shifts: In:  [P.O.:120;  I.V.:1340; IV JESJDXVRR:496]  Out: 1325 [Urine:1325]    Safety Concerns:     [address] [employer] Safety Concerns:024236914}    Impairments/Disabilities:      {[facility] Impairments/Disabilities:106155427}    Nutrition Therapy:  Current Nutrition Therapy:   [address] [employer] Diet List:722877150}    Routes of Feeding: {CHP DME Other Feedings:954981372}  Liquids: {Slp liquid thickness:57328}  Daily Fluid Restriction: {CHP DME Yes amt example:491463434}  Last Modified Barium Swallow with Video (Video Swallowing Test): {Done Not Done [facility]:926100967}    Treatments at the Time of Hospital Discharge:   Respiratory Treatments: ***  Oxygen Therapy:  {Therapy; copd oxygen:92461}  Ventilator:    {MH CC Vent XVR}    Rehab Therapies: {THERAPEUTIC INTERVENTION:9873164215}  Weight Bearing Status/Restrictions: 50Michelle Friend CC Weight Bearin}  Other Medical Equipment (for information only, NOT a DME order):  {EQUIPMENT:038452141}  Other Treatments: ***    Patient's personal belongings (please select all that are sent with patient):  {CHP DME Belongings:056262457}    RN SIGNATURE:  {Esignature:994967276}    CASE MANAGEMENT/SOCIAL WORK SECTION    Inpatient Status Date: ***    Readmission Risk Assessment Score:  Readmission Risk              Risk of Unplanned Readmission:        24           Discharging to Facility/ Agency   · Name:   · Address:  · Phone:  · Fax:    Dialysis Facility (if applicable)   · Name:  · Address:  · Dialysis Schedule:  · Phone:  · Fax:    / signature: {Esignature:592748767}    PHYSICIAN SECTION    Prognosis: {Prognosis:3220178514}    Condition at Discharge: [address] Patient Condition:072137053}    Rehab Potential (if transferring to Rehab): {Prognosis:8885629214}    Recommended Labs or Other Treatments After Discharge: ***    Physician Certification: I certify the above information and transfer of Nancye Olszewski  is necessary for the continuing treatment of the diagnosis listed and that he requires {Admit to Appropriate Level of Care:43722} for {GREATER/LESS:177516359} 30 days.      Update Admission H&P: {CHP DME Changes in QBRKZ:177145948}    PHYSICIAN SIGNATURE:  {Esignature:754240911} Electronically signed by Arlette Canela DO on 5/3/2019 at 1:42 PM

## 2019-05-03 NOTE — PROGRESS NOTES
Ruthann Carrasco M.D.,[facility]  Wanda Zamora D.O., F.ILYA.C.O.I., Lance Talbot M.D. Demetra Scott M.D., Vini Cuenca M.D. Daily Pulmonary Progress Note    Patient:  Hodan Crespo 80 y.o. male MRN: [id number]     Date of Service: 5/3/2019      Synopsis     We are following patient for acute respiratory failure with hypoxia, recurrent PNA LLL    \"CC\" cough, shortness of breath    Code status: Limited, no intubation, defibrillation, compressions      Subjective      Patient was seen and examined lying in bed in no apparent distress. He is seen on 15L HFNC today. He complains of a productive cough with difficulty expectorating. Tolerated bronch yesterday. Review of Systems:  Constitutional: Denies fever, weight loss, night sweats, and fatigue  Skin: Denies pigmentation, dark lesions, and rashes   HEENT: Denies hearing loss, tinnitus, ear drainage, epistaxis, sore throat, and hoarseness. Cardiovascular: Denies palpitations, chest pain, and chest pressure. Respiratory: Denies dyspnea at rest, hemoptysis, apnea, and choking.   Gastrointestinal: Denies nausea, vomiting, poor appetite, diarrhea, heartburn or reflux  Genitourinary: Denies dysuria, frequency, urgency or hematuria  Musculoskeletal: Denies myalgias, muscle weakness, and bone pain  Neurological: Denies dizziness, vertigo, headache, and focal weakness  Psychological: Denies anxiety and depression  Endocrine: Denies heat intolerance and cold intolerance  Hematopoietic/Lymphatic: Denies bleeding problems and blood transfusions    24-hour events:  none    Objective   Vitals: BP (!) 186/93   Pulse 90   Temp 97.7 °F (36.5 °C) (Temporal)   Resp 24   Ht 5' 11\" (1.803 m)   Wt 156 lb 12 oz (71.1 kg)   SpO2 97%   BMI 21.86 kg/m²     I/O:    Intake/Output Summary (Last 24 hours) at 5/3/2019 0931  Last data filed at 5/3/2019 0536  Gross per 24 hour   Intake 1944 ml   Output 1325 ml   Net 619 ml                        CURRENT pleural effusion. Continued follow-up   to complete resolution is recommended. ECHO  4/24/2019  Left ventricle is normal in size .   Mild left ventricular concentric hypertrophy noted.   No regional wall motion abnormalities seen.   Normal left ventricular ejection fraction.   The left atrium is mildly dilated.   Mild thickening of the mitral valve leaflets.   Moderate mitral annular calcification.   Mild mitral regurgitation is present.   Mild functional mitral stenosis .   The aortic valve appears mildly sclerotic.   Mild tricuspid regurgitation    Labs:  Lab Results   Component Value Date    WBC 10.4 05/03/2019    HGB 9.1 05/03/2019    HCT 30.1 05/03/2019    .0 05/03/2019    MCH 30.8 05/03/2019    MCHC 30.2 05/03/2019    RDW 14.5 05/03/2019     05/03/2019    MPV 11.0 05/03/2019     Lab Results   Component Value Date     05/02/2019    K 3.1 05/02/2019    K 4.4 04/21/2019     05/02/2019    CO2 30 05/02/2019    BUN 15 05/02/2019    CREATININE 1.3 05/02/2019    LABALBU 3.2 04/22/2019    CALCIUM 8.2 05/02/2019    GFRAA >60 05/02/2019    LABGLOM 52 05/02/2019     No results found for: PROTIME, INR  Recent Labs     04/30/19  1740   PROBNP 4,837*     Recent Labs     05/01/19  0957   PROCAL 0.36*     This SmartLink has not been configured with any valid records. Micro:  No results for input(s): CULTRESP in the last 72 hours. No results for input(s): LABGRAM in the last 72 hours. No results for input(s): LEGUR in the last 72 hours. No results for input(s): STREPNEUMAGU in the last 72 hours. No results for input(s): LP1UAG in the last 72 hours. Assessment:    1. Acute on chronic respiratory failure with hypoxemia  2. Recurrent pneumonia left lower lobe, likely aspiration  3. Sepsis  4. Leukocytosis   5. Recent CVA with left-sided hemiparesis  6. Dysphagia  7. Former tobacco abuse, in remission  8. Malnutrition  9. New onset A fib/NSVT  10.  Hypernatremia     Plan: 1. Continue chest vest therapy q4 hours, right side down, Albuterol q4 hours, Pulmozyme BID  2. Continue antibiotics per ID: Zyvox, Diflucan, Cefepime  3. Cultures pending  4. Currently NPO, continue fluids D5 and .045, speech to re-evaluate  5. Cardiology following for arrhythmia  6. Solu medrol 40mg q8 hours  7. CT chest pending    This plan of care was reviewed in collaboration with Dr. Chelsie Sewell  Electronically signed by LEANN Salcedo CNP on 5/3/2019 at 9:31 AM      Addendum:    CT chest reviewed. Patient was multifocal pneumonia and loculated left effusion questionable parapneumonic effusion . Continue antibiotics per ID . Questionable need for diagnostic thoracentesis will discuss with ID patient is not a surgical candidate. Awaiting speech reevaluation. I personally saw, examined, and cared for the patient. Labs, medications, radiographs reviewed. I agree with history exam and plans detailed in NP note.     Alayna Fierro MD

## 2019-05-03 NOTE — SIGNIFICANT EVENT
Code blue note    Code blue was called at 16:36    Patient has LIMITED CODE (meds only). On arrival, pt was unresponsive with agonal breathing. Bradycardic on monitor with weak pulse. No BP appreciated. He was given 1 dose of epinephrine at 16:40. He then went into PEA. He was in asystole at 16:43. Family at bedside. Patient was pronounced dead at 16:43    Isabelle Kelly D.O.   Internal Medicine  5/3/2019  5:26 PM

## 2019-05-03 NOTE — PROGRESS NOTES
[facility] notified of patient's expiration, spoke with Yunior Bernal. Patient's body may be released.      Tayo Mackenzie

## 2019-05-03 NOTE — PROGRESS NOTES
Hospital Medicine    Subjective:  Pt seen this am pt with increased resp distress this am pt with int wide complex tachycardia      Current Facility-Administered Medications:     acetaminophen (TYLENOL) suppository 650 mg, 650 mg, Rectal, Q4H PRN, Julio Romero DO    dornase alpha (PULMOZYME) nebulizer solution 2.5 mg, 2.5 mg, Inhalation, BID, Geraldine Perkins, APRN - CNP    dextrose 5 % and 0.45 % NaCl with KCl 20 mEq infusion, , Intravenous, Continuous, Julio Romero DO, Last Rate: 75 mL/hr at 05/02/19 1807    methylPREDNISolone sodium (SOLU-MEDROL) injection 40 mg, 40 mg, Intravenous, Q8H, Concepcion Saxena MD    metoprolol (LOPRESSOR) injection 5 mg, 5 mg, Intravenous, Q6H, Concepcion Saxena MD, 5 mg at 05/02/19 1719    mineral oil-hydrophilic petrolatum (HYDROPHOR) ointment, , Topical, BID PRN, Julio Romero DO    cefepime (MAXIPIME) 1 g IVPB extended (mini-bag), 1 g, Intravenous, Q8H, Billy Naidu MD, Last Rate: 12.5 mL/hr at 05/02/19 1522, 1 g at 05/02/19 1522    linezolid (ZYVOX) IVPB 600 mg, 600 mg, Intravenous, Q12H, Billy Naidu MD, Stopped at 05/02/19 1803    fluconazole (DIFLUCAN) in 0.9 % sodium chloride IVPB 200 mg, 200 mg, Intravenous, Q24H, Billy Naidu MD, Last Rate: 100 mL/hr at 05/02/19 1522, 200 mg at 05/02/19 1522    guaiFENesin tablet 400 mg, 400 mg, Oral, TID, Geraldine Perkins APRN - CNP, 400 mg at 05/01/19 2103    chlorhexidine (PERIDEX) 0.12 % solution 15 mL, 15 mL, Mouth/Throat, BID, Judy Zambrano MD, 15 mL at 05/02/19 1037    bisacodyl (DULCOLAX) suppository 10 mg, 10 mg, Rectal, Daily PRN, Julio Romero DO, 10 mg at 05/01/19 1735    acetaminophen (TYLENOL) tablet 650 mg, 650 mg, Oral, Q6H PRN, Julio Romero DO    aspirin EC tablet 81 mg, 81 mg, Oral, Daily, Julio Romero DO    atorvastatin (LIPITOR) tablet 80 mg, 80 mg, Oral, Daily, Julio Romero DO    latanoprost (XALATAN) 0.005 % ophthalmic solution 1 drop, 1 drop, Both Eyes, Nightly, Shilpa Trevizo, DO, 1 drop at 05/01/19 2104    dorzolamide (TRUSOPT) 2 % ophthalmic solution 1 drop, 1 drop, Both Eyes, BID, Kimberly Grew Malmer, DO, 1 drop at 05/02/19 1038    clopidogrel (PLAVIX) tablet 75 mg, 75 mg, Oral, Daily, Kimberly Grew Malmer, DO    docusate sodium (COLACE) capsule 200 mg, 200 mg, Oral, Nightly, Kimberly Grew Malmer, DO, 200 mg at 05/01/19 2103    finasteride (PROSCAR) tablet 5 mg, 5 mg, Oral, Daily, Kimberly Grew Malmer, DO    ramipril (ALTACE) capsule 5 mg, 5 mg, Oral, Daily, Kimberly Grew Malmer, DO    tamsulosin [facility]) capsule 0.4 mg, 0.4 mg, Oral, Nightly, Kimberly Grew Malmer, DO, 0.4 mg at 05/01/19 2103    sodium chloride flush 0.9 % injection 10 mL, 10 mL, Intravenous, 2 times per day, Shilpa Trevizo DO, 10 mL at 05/02/19 1057    sodium chloride flush 0.9 % injection 10 mL, 10 mL, Intravenous, PRN, Kimberly Grew Malmer, DO, 10 mL at 05/02/19 1523    magnesium hydroxide (MILK OF MAGNESIA) 400 MG/5ML suspension 30 mL, 30 mL, Oral, Daily PRN, Kimberly Grew Malmer, DO    ondansetron [facility]) injection 4 mg, 4 mg, Intravenous, Q6H PRN, Kimberly Noyola Malmer, DO    enoxaparin (LOVENOX) injection 40 mg, 40 mg, Subcutaneous, Daily, Kimberly Grew Malmer, DO, 40 mg at 05/01/19 0954    brimonidine (ALPHAGAN) 0.2 % ophthalmic solution 1 drop, 1 drop, Both Eyes, BID, 1 drop at 05/02/19 1038 **AND** timolol (TIMOPTIC) 0.5 % ophthalmic solution 1 drop, 1 drop, Both Eyes, BID, Kimberly Grew Malmer, DO, 1 drop at 05/02/19 1038    Objective:    BP (!) 152/98   Pulse 99   Temp 98.3 °F (36.8 °C) (Temporal)   Resp (!) 33   Ht 5' 11\" (1.803 m)   Wt 161 lb (73 kg)   SpO2 94%   BMI 22.45 kg/m²     Heart:  Reg with ectopy  Lungs:  rhonchi  Abd: bowel sounds present, nontender, nondistended, no masses  Extrem:  Edema legs    CBC with Differential:    Lab Results   Component Value Date    WBC 13.2 05/02/2019    RBC 2.85 05/02/2019    HGB 8.9 05/02/2019    HCT 28.4 05/02/2019     05/02/2019    MCV 99.6 05/02/2019    MCH 31.2 05/02/2019    MCHC 31.3 05/02/2019    RDW 14.5 05/02/2019    NRBC 0.9 06/20/2018    LYMPHOPCT 5.4 05/02/2019    MONOPCT 3.0 05/02/2019    BASOPCT 0.2 05/02/2019    MONOSABS 0.39 05/02/2019    LYMPHSABS 0.72 05/02/2019    EOSABS 0.06 05/02/2019    BASOSABS 0.03 05/02/2019     CMP:    Lab Results   Component Value Date     05/02/2019    K 3.1 05/02/2019    K 4.4 04/21/2019     05/02/2019    CO2 30 05/02/2019    BUN 15 05/02/2019    CREATININE 1.3 05/02/2019    GFRAA >60 05/02/2019    LABGLOM 52 05/02/2019    GLUCOSE 140 05/02/2019    PROT 6.4 04/22/2019    LABALBU 3.2 04/22/2019    CALCIUM 8.2 05/02/2019    BILITOT 0.5 04/22/2019    ALKPHOS 182 04/22/2019    AST 33 04/22/2019    ALT 23 04/22/2019     Warfarin PT/INR:  No results found for: INR, PROTIME    Assessment:    Active Problems:    Sepsis ([facility] [address]    Severe protein-calorie malnutrition ([facility] [address] [facility]    Pneumonia due to organism    Encounter for palliative care  Resolved Problems:    * No resolved hospital problems.  *      Plan:  cxr cardio to see aerosols pulm for Charlie Saunders Heather  8:15 PM  5/2/2019

## 2019-05-03 NOTE — FLOWSHEET NOTE
Inpatient Wound Care    Admit Date: 4/30/2019  4:59 PM    Reason for consult:  coccyx    Significant history:  Admitted from [facility] for SOB    CVA, coronary artery disease, oropharyngeal  dysphagia, carotid stenosis, aortic aneurysm, BPH, hypertension,  elevated cholesterol, NIDDM, glaucoma. Findings:       05/03/19 1253   Skin Integrity   Dressing Site Heel  (right heel)   Assessed this shift? Yes   Preventative Dressing Yes   Skin Integrity Site 2   Skin Integrity Location 2 Bruising   Location 2 BUE   Skin Integrity Site 3   Skin Integrity Location 3 Other (Comment)  (dry patchy areas)    Location 3 bilateral inner buttocks   Wound 05/01/19 Coccyx   Date First Assessed/Time First Assessed: 05/01/19 0955   Present on Hospital Admission: Yes  Location: Coccyx   Wound Image    Wound Deep tissue/Injury   Wound Length (cm) 5 cm   Wound Width (cm) 2.2 cm   Wound Depth (cm)   (obscured)   Wound Surface Area (cm^2) 11 cm^2   Change in Wound Size % (l*w) -450   Wound Assessment Purple   Drainage Amount None   Luh-wound Assessment Blanchable erythema     **Informed Consent**    The patient has given verbal consent to have photos taken of coccyx and inserted into their chart as part of their permanent medical record for purposes of documentation, treatment management and/or medical review. All Images taken on 5/3/19 of patient name: Thomas Lund were transmitted and stored on secured [employer] located within [employer] by a registered [employer] Mobile Application Device. Impression:  Coccyx DTI      Plan:  Bilateral Heel mepilex for prevention,foam heel protectors  Aquaphor to bilateral buttocks  Comfort glide system  Continued preventive care.  [facility] 5/3/2019 1:00 PM

## 2019-05-03 NOTE — PLAN OF CARE
Problem: Falls - Risk of:  Goal: Will remain free from falls  Description  Will remain free from falls  5/3/2019 7939 by Marlene Colldao RN  Outcome: Met This Shift  5/2/2019 2338 by Deanna Altman RN  Outcome: Met This Shift     Problem: Falls - Risk of:  Goal: Absence of physical injury  Description  Absence of physical injury  5/3/2019 8856 by Marlene Collado RN  Outcome: Met This Shift  5/2/2019 2338 by Deanna Altman RN  Outcome: Met This Shift     Problem: Risk for Impaired Skin Integrity  Goal: Tissue integrity - skin and mucous membranes  Description  Structural intactness and normal physiological function of skin and  mucous membranes.   5/3/2019 1832 by Marlene Collado RN  Outcome: Met This Shift  5/2/2019 2338 by Deanna Altman RN  Outcome: Ongoing     Problem: Hyperthermia:  Goal: Ability to maintain a body temperature in the normal range will improve  Description  Ability to maintain a body temperature in the normal range will improve  5/3/2019 0712 by Marlene Collado RN  Outcome: Met This Shift  5/2/2019 2338 by Deanna Altman RN  Outcome: Ongoing     Problem: Sensory:  Goal: General experience of comfort will improve  Description  General experience of comfort will improve  5/3/2019 0712 by Marlene Collado RN  Outcome: Met This Shift  5/2/2019 2338 by Deanna Altman RN  Outcome: Ongoing

## 2019-05-03 NOTE — PROGRESS NOTES
[facility] Medicine    Subjective:  Pt alert responsive son at bedside no resp distress this am s/p bronch yesterday      Current Facility-Administered Medications:     acetaminophen (TYLENOL) suppository 650 mg, 650 mg, Rectal, Q4H PRN, Marce Romero DO    dornase alpha (PULMOZYME) nebulizer solution 2.5 mg, 2.5 mg, Inhalation, BID, Patricio Squires, APRN - CNP    dextrose 5 % and 0.45 % NaCl with KCl 20 mEq infusion, , Intravenous, Continuous, Marce Romero DO, Last Rate: 75 mL/hr at 05/03/19 0615    methylPREDNISolone sodium (SOLU-MEDROL) injection 40 mg, 40 mg, Intravenous, Q8H, Brenda Tamayo MD, 40 mg at 05/03/19 0540    metoprolol (LOPRESSOR) injection 5 mg, 5 mg, Intravenous, Q6H, Brenda Tamayo MD, 5 mg at 05/03/19 0540    mineral oil-hydrophilic petrolatum (HYDROPHOR) ointment, , Topical, BID PRN, Marce Romero DO    cefepime (MAXIPIME) 1 g IVPB extended (mini-bag), 1 g, Intravenous, Q8H, Branden Rhodes MD, Last Rate: 12.5 mL/hr at 05/03/19 0634, 1 g at 05/03/19 0634    linezolid (ZYVOX) IVPB 600 mg, 600 mg, Intravenous, Q12H, Branden Rhodes MD, Stopped at 05/03/19 0615    fluconazole (DIFLUCAN) in 0.9 % sodium chloride IVPB 200 mg, 200 mg, Intravenous, Q24H, Branden Rhodes MD, Last Rate: 100 mL/hr at 05/02/19 1522, 200 mg at 05/02/19 1522    guaiFENesin tablet 400 mg, 400 mg, Oral, TID, Patricio Squires, APRN - CNP, 400 mg at 05/01/19 2103    chlorhexidine (PERIDEX) 0.12 % solution 15 mL, 15 mL, Mouth/Throat, BID, Pelon Mercado MD, 15 mL at 05/02/19 2211    bisacodyl (DULCOLAX) suppository 10 mg, 10 mg, Rectal, Daily PRN, Marce Romero, , 10 mg at 05/01/19 1735    acetaminophen (TYLENOL) tablet 650 mg, 650 mg, Oral, Q6H PRN, Marce Romero, DO    aspirin EC tablet 81 mg, 81 mg, Oral, Daily, Marcedebra Villasenormer, DO    atorvastatin (LIPITOR) tablet 80 mg, 80 mg, Oral, Daily, Marce Romero, DO    latanoprost (XALATAN) 0.005 % ophthalmic solution 1 drop, 1 drop, Both Eyes, Nightly, Cathleen Dial, DO, 1 drop at 05/02/19 2209    dorzolamide (TRUSOPT) 2 % ophthalmic solution 1 drop, 1 drop, Both Eyes, BID, Dagmar Romero DO, 1 drop at 05/02/19 2211    clopidogrel (PLAVIX) tablet 75 mg, 75 mg, Oral, Daily, Dagmar Romero DO    docusate sodium (COLACE) capsule 200 mg, 200 mg, Oral, Nightly, Dagmar Romero, DO, 200 mg at 05/01/19 2103    finasteride (PROSCAR) tablet 5 mg, 5 mg, Oral, Daily, Dagmar Romero DO    ramipril (ALTACE) capsule 5 mg, 5 mg, Oral, Daily, Dagmar Romero DO    tamsulosin [facility]) capsule 0.4 mg, 0.4 mg, Oral, Nightly, Dagmar Romero DO, 0.4 mg at 05/01/19 2103    sodium chloride flush 0.9 % injection 10 mL, 10 mL, Intravenous, 2 times per day, Cathleen Caldwell DO, 10 mL at 05/02/19 2211    sodium chloride flush 0.9 % injection 10 mL, 10 mL, Intravenous, PRN, Dagmar Romero DO, 10 mL at 05/03/19 0542    magnesium hydroxide (MILK OF MAGNESIA) 400 MG/5ML suspension 30 mL, 30 mL, Oral, Daily PRN, Dagmar Romero DO    ondansetron [facility]) injection 4 mg, 4 mg, Intravenous, Q6H PRN, Dagmar Romero DO    enoxaparin (LOVENOX) injection 40 mg, 40 mg, Subcutaneous, Daily, Dagmar Romero DO, 40 mg at 05/01/19 0954    brimonidine (ALPHAGAN) 0.2 % ophthalmic solution 1 drop, 1 drop, Both Eyes, BID, 1 drop at 05/02/19 2211 **AND** timolol (TIMOPTIC) 0.5 % ophthalmic solution 1 drop, 1 drop, Both Eyes, BID, Dagmar Romero DO, 1 drop at 05/02/19 2210    Objective:    BP (!) 167/81   Pulse 109   Temp 97.9 °F (36.6 °C) (Temporal)   Resp 24   Ht 5' 11\" (1.803 m)   Wt 156 lb 12 oz (71.1 kg)   SpO2 98%   BMI 21.86 kg/m²     Heart:  Reg  Lungs:  rhonchi  Abd: bowel sounds present, nontender, nondistended, no masses  Extrem:  No clubbing, cyanosis, or edema    CBC with Differential:    Lab Results   Component Value Date    WBC 10.4 05/03/2019    RBC 2.95 05/03/2019    HGB 9.1 05/03/2019    HCT 30.1 05/03/2019     05/03/2019    MCV 102.0 05/03/2019    MCH 30.8 05/03/2019    MCHC 30.2 05/03/2019    RDW 14.5 05/03/2019    NRBC 0.9 06/20/2018    LYMPHOPCT 2.6 05/03/2019    MONOPCT 0.9 05/03/2019    BASOPCT 0.1 05/03/2019    MONOSABS 0.10 05/03/2019    LYMPHSABS 0.31 05/03/2019    EOSABS 0.00 05/03/2019    BASOSABS 0.00 05/03/2019     CMP:    Lab Results   Component Value Date     05/02/2019    K 3.1 05/02/2019    K 4.4 04/21/2019     05/02/2019    CO2 30 05/02/2019    BUN 15 05/02/2019    CREATININE 1.3 05/02/2019    GFRAA >60 05/02/2019    LABGLOM 52 05/02/2019    GLUCOSE 140 05/02/2019    PROT 6.4 04/22/2019    LABALBU 3.2 04/22/2019    CALCIUM 8.2 05/02/2019    BILITOT 0.5 04/22/2019    ALKPHOS 182 04/22/2019    AST 33 04/22/2019    ALT 23 04/22/2019     Warfarin PT/INR:  No results found for: INR, PROTIME    Assessment:    Active Problems:    Sepsis ([address]    Severe protein-calorie malnutrition ([facility]    Pneumonia due to organism    Encounter for palliative care  Resolved Problems:    * No resolved hospital problems.  *      Plan:  Repeat cxr cont aerosols atb advanve diet when ok with pulm repeat lab        Kassie Chowdary  8:37 AM  5/3/2019

## 2019-05-03 NOTE — PROGRESS NOTES
W for Palliative Medicine met with pt and pt's wife, Jacqueline Forde, at bedside. Pt alert and oriented, reports he is breathing better since bronchoscopy this morning. Family has decided to pursue [facility] in hopes that pt will be able to improve. Jacqueline Forde states she is appreciative for knowledge about other options including comfort care, however, in case of need in the future. Psychosocial support provided.

## 2019-05-03 NOTE — PROGRESS NOTES
Reason for follow up:  Hypoxic respiratory failure; NSVT; PACs    Subjective: Thinks his breathing is slightly better. Denies CP. Objective:    No distress. No events overnight. Sleepy. Scheduled Meds:   dornase alpha  2.5 mg Inhalation BID    methylPREDNISolone  40 mg Intravenous Q8H    metoprolol  5 mg Intravenous Q6H    cefepime  1 g Intravenous Q8H    linezolid  600 mg Intravenous Q12H    fluconazole  200 mg Intravenous Q24H    guaiFENesin  400 mg Oral TID    chlorhexidine  15 mL Mouth/Throat BID    aspirin  81 mg Oral Daily    atorvastatin  80 mg Oral Daily    latanoprost  1 drop Both Eyes Nightly    dorzolamide  1 drop Both Eyes BID    clopidogrel  75 mg Oral Daily    docusate sodium  200 mg Oral Nightly    finasteride  5 mg Oral Daily    ramipril  5 mg Oral Daily    tamsulosin  0.4 mg Oral Nightly    sodium chloride flush  10 mL Intravenous 2 times per day    enoxaparin  40 mg Subcutaneous Daily    brimonidine  1 drop Both Eyes BID    And    timolol  1 drop Both Eyes BID       Continuous Infusions:   dextrose 5% and 0.45% NaCl with KCl 20 mEq 75 mL/hr at 05/03/19 0615           Intake/Output Summary (Last 24 hours) at 5/3/2019 0807  Last data filed at 5/3/2019 0536  Gross per 24 hour   Intake 1944 ml   Output 1325 ml   Net 619 ml       Patient Vitals for the past 96 hrs (Last 3 readings):   Weight   05/03/19 0545 156 lb 12 oz (71.1 kg)   05/02/19 0602 161 lb (73 kg)   05/01/19 0255 156 lb (70.8 kg)          PE:   BP (!) 167/81   Pulse 109   Temp 97.9 °F (36.6 °C) (Temporal)   Resp 24   Ht 5' 11\" (1.803 m)   Wt 156 lb 12 oz (71.1 kg)   SpO2 98%   BMI 21.86 kg/m²   CONST: In general, this is a well developed, elderly male who appears of stated age. Awake, alert, cooperative, no apparent distress. Throat: Oral mucosa pink and moist.  HEENT: Head- normocephalic, atraumatic; Eyes:conjunctivae pink, no noted xanthomas.   Neck: no stridor, no mitral stenosis .   The aortic valve appears mildly sclerotic.   Mild tricuspid regurgitation.        Assessment:  1. Presented with SOB and worsening hypoxia. CXR consistent with left lung collapse secondary to mucus plugging. Seen by Pulmonary. Bronch revealed mucus plugging of the left main stem bronchus. CXR today improved. 2. Hypoxic respiratory failure. On HF NC presently. At baseline uses 4L. Secondary to mucus plugging and possible aspiration PNA. No CHF on exam. On ATB, O2 supplementation. 3. NSVT: tachyarrhythmias probably related to his respiratory issues. Has PACs. No VT. 4. Elevated pBNP: nonspecific. No CHF on exam  5. SERGIO: SCr 1.0 on presentation. 1.3 today. Being hydrated. 6. CAD s/p CABG followed by PCI later. No anginal complaints. On DAPT, BB. On hold presently secondary to being NPO. 7. HTN       Plan:  1. When able to take PO start BB: can use Toprol or lopressor  2. ATB, steroids, nebs as per pulmonary service  3. No cardiac work up planned  4. Cardiology will see prn. Please call with questions. Dr. Jostin Saunders MD.  [id number] [facility] Cardiology.

## 2019-05-04 LAB
CULTURE, RESPIRATORY: ABNORMAL
CULTURE, RESPIRATORY: ABNORMAL
ORGANISM: ABNORMAL
SMEAR, RESPIRATORY: ABNORMAL

## 2019-05-04 NOTE — PROGRESS NOTES
RN called to unit by PCA. Patient had no blood pressure, heart rate or pupil reflexes. Code blue called. Patient  and time of death 306 [address] [place].    ,Obi Puentes

## 2019-05-04 NOTE — PROGRESS NOTES
Family chose [facility] on [address] in [facility]. Sil Zepeda here to take patient to  home.    Jennifer Farmer

## 2019-05-05 LAB
BLOOD CULTURE, ROUTINE: NORMAL
CULTURE, BLOOD 2: NORMAL

## 2019-05-11 NOTE — DISCHARGE SUMMARY
[address]                  [address] [facility],  [facility]                               DISCHARGE SUMMARY    PATIENT NAME: Rajni Moya                     :        1932  MED REC NO:   [id number]                            ROOM:       6411  ACCOUNT NO:   [de-identified]                           ADMIT DATE: 2019  PROVIDER:     Laura Tapia DO                  DISCHARGE DATE:  2019      DATE OF EXPIRATION:  2019    DISCHARGE DIAGNOSES:  1. Aspiration pneumonia. 2.  Sepsis. 3.  Oropharyngeal dysphagia. 4.  Recent CVA. 5.  BPH. 6.  Glaucoma. 7.  Carotid stenosis. 8.  Hypertension. 9.  Coronary artery disease. 10.  Hyperlipidemia. 11.  Status post coronary artery bypass graft. 12.  NIDDM. HOSPITAL COURSE:  The patient was an 51-year-old  male who  presented to the emergency room from an extended care facility with  increased shortness of breath. He was felt to have aspiration  pneumonia. He was admitted for further evaluation and treatment. He  was seen by Pulmonary, Dr. Kunal Urrutia, as well as Cardiology and Infectious  Disease. The patient underwent bronchoscopy per Pulmonary on  2019. The patient had complete left lung opacification with thick  purulent mucous plug completely obstructing the left mainstem. On  2019, the patient had acute decompensation with cardiopulmonary  arrest.  The patient .         Dong Kramer DO    D: 05/10/2019 16:17:26       T: 05/10/2019 16:26:53     [id number]  Job#: [id number]     Doc#: [id number]    CC:  Avelino Mary DO

## 2019-06-21 NOTE — DISCHARGE SUMMARY
[facility] Physician Discharge Summary       Pancho Quintero DO  [address] [id number]  [phone]          Activity level: as tolerated   Diet: No diet orders on file  [facility]  Condition on discharge: fair  Patient ID:  Rom Barraza  [id number]  80 y.o.  9/23/1932    Admit date: 4/20/2019    Discharge date and time:  6/21/2019  10:23 AM    Admission Diagnoses: Principal Problem:    Community acquired pneumonia of left lower lobe of lung ([address]  Active Problems:    Oropharyngeal dysphagia    Diabetes mellitus without complication ([address]    Hyperlipidemia    Essential hypertension, benign    CAD (coronary artery disease)    CVA (cerebral vascular accident) ([address]    Fall    Generalized weakness    Thrombocytopenia ([address]    Essential hypertension    Controlled type 2 diabetes mellitus without complication ([address]    Aspiration pneumonia due to gastric secretions (HCC)    Acute respiratory failure with hypoxia (HCC)    Aspiration pneumonia ([address]    Multifocal atrial tachycardia (HCC)    SVT (supraventricular tachycardia) ([address]  Resolved Problems:    * No resolved hospital problems. *    Discharge Diagnoses: Principal Problem:    Community acquired pneumonia of left lower lobe of lung ([address]  Active Problems:    Oropharyngeal dysphagia    Diabetes mellitus without complication ([address]    Hyperlipidemia    Essential hypertension, benign    CAD (coronary artery disease)    CVA (cerebral vascular accident) ([address]    Fall    Generalized weakness    Thrombocytopenia ([address]    Essential hypertension    Controlled type 2 diabetes mellitus without complication ([address]    Aspiration pneumonia due to gastric secretions (HCC)    Acute respiratory failure with hypoxia (HCC)    Aspiration pneumonia (HCC)    Multifocal atrial tachycardia (HCC)    SVT (supraventricular tachycardia) ([address]  Resolved Problems:    * No resolved hospital problems.  *    Consults:  IP CONSULT TO SOCIAL WORK  IP CONSULT TO SOCIAL WORK  IP CONSULT TO PULMONOLOGY  IP CONSULT TO CARDIOLOGY    Procedures:     Hospital Course:   80 y.o. male with a history of DM, HTN, BPH, CABG and H/o CVA presents with fatigue and weakness. Patient states that in the last 10 days he was been diagnosed with pneumonia. He states that he has had some increased fatigue. Patient also stated that his knees feel weak and he is very tired. Patient recently fell last night d/t weakness. He denies hitting his head, but complains of back and pelvic pain. Patient had had his prostate shaved on 17th d/t urinary frequency and currently has a laogs catheter in that will be removed on Monday 22. Patient associated symptoms from his pneumonia is shortness of breath, fatigue, and weakness. Patient denies any tobacco, illicit drug abuse but drinks alcohol socially.        · Patient Nile Zuñiga is a 80 y.o. presented with Community acquired pneumonia of left lower lobe of lung ([address] [J18.1]  Community acquired pneumonia of left lower lobe of lung ([address] [J18.1]  Aspiration pneumonia due to gastric secretions, unspecified laterality, unspecified part of lung ([address] [J69.0]    Pt feels better, still has the dysphagia, worse with liquids, barium swallow showed aspiration and failed swallow oropharyngeal stage. Ptient is weak on the left side, lower and upper extremities, and per him and his family this is much worse than his status when he had the initial stroke in Jun 2018, MRI brain ordered STAT. MRI brain  Impression   1. Areas of recent stroke are seen involving right frontal and   parietal periventricular white matter as well as focal areas of recent   stroke at level of right insular cortex. Clinical correlation   recommended.       2.  Severe burden of abnormal signal located in the periventricular and   subcortical white matter suggestive of severe microvascular ischemic   change versus nonspecific encephalopathy.      Transfer to [facility] was arranged for further neurological evaluation, talked to Dr. Adriane Khan, accepted the patient. Discharge Exam:  Pulmonary/Chest: + bibasilar crackles more on the left, no wheezes, rales or rhonchi, normal air movement, no respiratory distress  Cardiovascular: normal rate, normal S1 and S2 and no carotid bruits  Abdomen: soft, non-tender, non-distended, normal bowel sounds, no masses or organomegaly  Extremities: no cyanosis, no clubbing and no edema  Neurologic: Hx of lacunar stroke in 2018, on my exam there is left side weakness in upper and lower extremities (as per family and patient this has been going on since about 1 month)    No intake/output data recorded. No intake/output data recorded. LABS:  No results for input(s): NA, K, CL, CO2, BUN, CREATININE, GLUCOSE, CALCIUM in the last 72 hours. No results for input(s): WBC, RBC, HGB, HCT, MCV, MCH, MCHC, RDW, PLT, MPV in the last 72 hours. No results for input(s): POCGLU in the last 72 hours. Imaging:  Xr Chest Portable    Result Date: 2019  Patient MRN: [id number] : 1932 Age:  80 years Gender: Male Order Date: 2019 5:15 PM Exam: XR CHEST PORTABLE Number of Images: 1 view Indication:  Acute Shortness of breath and hypoxemia. Possible sepsis Possible sepsis Comparison: 2019 FINDINGS: There is an opacity in the right midlung which has been present on multiple prior exams and this is of indeterminate etiology but potentially could represent an underlying neoplasm. When possible CT of the chest should be considered for further evaluation. There is increasing opacity in the left mid and lower lung compatible with pneumonia. Heart is enlarged. Neither costophrenic angle is clearly blunted. Calcified pleural plaque on the left is again noted. 1. Increasing opacity in the left mid to lower lung compatible with pneumonia.  2. Persistent lesion in the right midlung which is moderately suspicious for underlying neoplasm and CT with contrast is recommended when this can be accomplished.        Patient Instructions:      Medication List      ASK your doctor about these medications    aspirin EC 81 MG EC tablet     atorvastatin 80 MG tablet  Commonly known as:  LIPITOR  Take 1 tablet by mouth daily     bimatoprost 0.01 % Soln ophthalmic drops  Commonly known as:  LUMIGAN     brimonidine-timolol 0.2-0.5 % ophthalmic solution  Commonly known as:  COMBIGAN     brinzolamide 1 % ophthalmic suspension  Commonly known as:  AZOPT     carboxymethylcellulose 1 % ophthalmic gel  Commonly known as:  THERATEARS     clopidogrel 75 MG tablet  Commonly known as:  PLAVIX  Take 1 tablet by mouth daily     docusate sodium 100 MG capsule  Commonly known as:  COLACE     finasteride 5 MG tablet  Commonly known as:  PROSCAR     Fish Oil 1200 MG Caps     ICAPS LUTEIN-ZEAXANTHIN Tbcr     metFORMIN 500 MG tablet  Commonly known as:  GLUCOPHAGE  TAKE 1 TABLET BY MOUTH TWICE DAILY WITH MEALS     metoprolol tartrate 50 MG tablet  Commonly known as:  LOPRESSOR  TAKE 1 TABLET BY MOUTH TWICE DAILY     nitroGLYCERIN 0.4 MG SL tablet  Commonly known as:  NITROSTAT     ramipril 2.5 MG capsule  Commonly known as:  ALTACE  Take 1 capsule by mouth daily     tamsulosin 0.4 MG capsule  Commonly known as:  FLOMAX              Note that more than 30 minutes was spent in preparing discharge papers, discussing discharge with patient, medication review, etc.    Signed:  Electronically signed by Andreina Calhoun MD on 6/21/2019 at 10:23 AM

## (undated) DEVICE — SOLUTION IV IRRIG 500ML 0.9% SODIUM CHL 2F7123

## (undated) DEVICE — SURGICAL PROCEDURE PACK BRONCH

## (undated) DEVICE — AIRWAY PHARYNGEAL AD 9 CM INTUB